# Patient Record
Sex: MALE | Race: WHITE | NOT HISPANIC OR LATINO | Employment: FULL TIME | URBAN - METROPOLITAN AREA
[De-identification: names, ages, dates, MRNs, and addresses within clinical notes are randomized per-mention and may not be internally consistent; named-entity substitution may affect disease eponyms.]

---

## 2017-05-21 ENCOUNTER — ANESTHESIA (OUTPATIENT)
Dept: ENDOSCOPY | Facility: HOSPITAL | Age: 42
End: 2017-05-21

## 2017-05-21 ENCOUNTER — HOSPITAL ENCOUNTER (EMERGENCY)
Facility: HOSPITAL | Age: 42
Discharge: HOME | End: 2017-05-22
Attending: EMERGENCY MEDICINE | Admitting: INTERNAL MEDICINE

## 2017-05-21 DIAGNOSIS — R07.9 CHEST PAIN: ICD-10-CM
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
## 2017-05-21 DIAGNOSIS — T18.128A FOOD IMPACTION OF ESOPHAGUS, INITIAL ENCOUNTER: Primary | ICD-10-CM

## 2017-05-21 PROCEDURE — 99284 EMERGENCY DEPT VISIT MOD MDM: CPT | Mod: ,,, | Performed by: EMERGENCY MEDICINE

## 2017-05-21 PROCEDURE — 99254 IP/OBS CNSLTJ NEW/EST MOD 60: CPT | Mod: ,,, | Performed by: INTERNAL MEDICINE

## 2017-05-21 PROCEDURE — 93005 ELECTROCARDIOGRAM TRACING: CPT

## 2017-05-21 PROCEDURE — 99285 EMERGENCY DEPT VISIT HI MDM: CPT | Mod: 25

## 2017-05-21 PROCEDURE — 96374 THER/PROPH/DIAG INJ IV PUSH: CPT

## 2017-05-21 PROCEDURE — 25000003 PHARM REV CODE 250: Performed by: STUDENT IN AN ORGANIZED HEALTH CARE EDUCATION/TRAINING PROGRAM

## 2017-05-21 PROCEDURE — 93010 ELECTROCARDIOGRAM REPORT: CPT | Mod: ,,, | Performed by: INTERNAL MEDICINE

## 2017-05-21 RX ORDER — GLUCAGON 1 MG
1 KIT INJECTION
Status: COMPLETED | OUTPATIENT
Start: 2017-05-21 | End: 2017-05-21

## 2017-05-21 RX ADMIN — GLUCAGON HYDROCHLORIDE 1 MG: 1 INJECTION, POWDER, FOR SOLUTION INTRAMUSCULAR; INTRAVENOUS; SUBCUTANEOUS at 10:05

## 2017-05-22 ENCOUNTER — ANESTHESIA EVENT (OUTPATIENT)
Dept: ENDOSCOPY | Facility: HOSPITAL | Age: 42
End: 2017-05-22

## 2017-05-22 ENCOUNTER — SURGERY (OUTPATIENT)
Age: 42
End: 2017-05-22

## 2017-05-22 VITALS
OXYGEN SATURATION: 98 % | DIASTOLIC BLOOD PRESSURE: 103 MMHG | TEMPERATURE: 98 F | WEIGHT: 220 LBS | HEART RATE: 75 BPM | SYSTOLIC BLOOD PRESSURE: 143 MMHG | RESPIRATION RATE: 20 BRPM | HEIGHT: 76 IN | BODY MASS INDEX: 26.79 KG/M2

## 2017-05-22 DIAGNOSIS — T18.128D FOOD IMPACTION OF ESOPHAGUS, SUBSEQUENT ENCOUNTER: Primary | ICD-10-CM
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
## 2017-05-22 PROBLEM — T18.128A FOOD IMPACTION OF ESOPHAGUS: Status: ACTIVE | Noted: 2017-05-22
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
## 2017-05-22 PROCEDURE — D9220A PRA ANESTHESIA: Mod: ,,, | Performed by: ANESTHESIOLOGY

## 2017-05-22 PROCEDURE — 43247 EGD REMOVE FOREIGN BODY: CPT | Mod: ,,, | Performed by: INTERNAL MEDICINE

## 2017-05-22 PROCEDURE — 27201042 HC RETRIEVAL NET: Performed by: INTERNAL MEDICINE

## 2017-05-22 PROCEDURE — 63600175 PHARM REV CODE 636 W HCPCS: Performed by: ANESTHESIOLOGY

## 2017-05-22 PROCEDURE — 25000003 PHARM REV CODE 250: Performed by: ANESTHESIOLOGY

## 2017-05-22 PROCEDURE — 99254 IP/OBS CNSLTJ NEW/EST MOD 60: CPT | Mod: ,,, | Performed by: INTERNAL MEDICINE

## 2017-05-22 PROCEDURE — 37000009 HC ANESTHESIA EA ADD 15 MINS: Performed by: INTERNAL MEDICINE

## 2017-05-22 PROCEDURE — 43247 EGD REMOVE FOREIGN BODY: CPT | Performed by: INTERNAL MEDICINE

## 2017-05-22 PROCEDURE — 37000008 HC ANESTHESIA 1ST 15 MINUTES: Performed by: INTERNAL MEDICINE

## 2017-05-22 RX ORDER — LORAZEPAM 2 MG/ML
0.25 INJECTION INTRAMUSCULAR ONCE AS NEEDED
Status: DISCONTINUED | OUTPATIENT
Start: 2017-05-22 | End: 2017-05-22 | Stop reason: HOSPADM

## 2017-05-22 RX ORDER — FENTANYL CITRATE 50 UG/ML
INJECTION, SOLUTION INTRAMUSCULAR; INTRAVENOUS
Status: DISCONTINUED | OUTPATIENT
Start: 2017-05-22 | End: 2017-05-22

## 2017-05-22 RX ORDER — FENTANYL CITRATE 50 UG/ML
25 INJECTION, SOLUTION INTRAMUSCULAR; INTRAVENOUS EVERY 5 MIN PRN
Status: DISCONTINUED | OUTPATIENT
Start: 2017-05-22 | End: 2017-05-22 | Stop reason: HOSPADM

## 2017-05-22 RX ORDER — HYDROMORPHONE HYDROCHLORIDE 1 MG/ML
0.2 INJECTION, SOLUTION INTRAMUSCULAR; INTRAVENOUS; SUBCUTANEOUS EVERY 5 MIN PRN
Status: DISCONTINUED | OUTPATIENT
Start: 2017-05-22 | End: 2017-05-22 | Stop reason: HOSPADM

## 2017-05-22 RX ORDER — LIDOCAINE HCL/PF 100 MG/5ML
SYRINGE (ML) INTRAVENOUS
Status: DISCONTINUED | OUTPATIENT
Start: 2017-05-22 | End: 2017-05-22

## 2017-05-22 RX ORDER — MIDAZOLAM HYDROCHLORIDE 1 MG/ML
INJECTION, SOLUTION INTRAMUSCULAR; INTRAVENOUS
Status: DISCONTINUED | OUTPATIENT
Start: 2017-05-22 | End: 2017-05-22

## 2017-05-22 RX ORDER — ONDANSETRON 2 MG/ML
4 INJECTION INTRAMUSCULAR; INTRAVENOUS DAILY PRN
Status: DISCONTINUED | OUTPATIENT
Start: 2017-05-22 | End: 2017-05-22 | Stop reason: HOSPADM

## 2017-05-22 RX ORDER — PROPOFOL 10 MG/ML
VIAL (ML) INTRAVENOUS
Status: DISCONTINUED | OUTPATIENT
Start: 2017-05-22 | End: 2017-05-22

## 2017-05-22 RX ORDER — SODIUM CHLORIDE 0.9 % (FLUSH) 0.9 %
3 SYRINGE (ML) INJECTION
Status: DISCONTINUED | OUTPATIENT
Start: 2017-05-22 | End: 2017-05-22 | Stop reason: HOSPADM

## 2017-05-22 RX ORDER — ROCURONIUM BROMIDE 10 MG/ML
INJECTION, SOLUTION INTRAVENOUS
Status: DISCONTINUED | OUTPATIENT
Start: 2017-05-22 | End: 2017-05-22

## 2017-05-22 RX ORDER — SODIUM CHLORIDE 9 MG/ML
INJECTION, SOLUTION INTRAVENOUS CONTINUOUS PRN
Status: DISCONTINUED | OUTPATIENT
Start: 2017-05-22 | End: 2017-05-22

## 2017-05-22 RX ORDER — SUCCINYLCHOLINE CHLORIDE 20 MG/ML
INJECTION INTRAMUSCULAR; INTRAVENOUS
Status: DISCONTINUED | OUTPATIENT
Start: 2017-05-22 | End: 2017-05-22

## 2017-05-22 RX ADMIN — LIDOCAINE HYDROCHLORIDE 5 ML: 20 INJECTION, SOLUTION INTRAVENOUS at 01:05

## 2017-05-22 RX ADMIN — MIDAZOLAM HYDROCHLORIDE 2 MG: 1 INJECTION, SOLUTION INTRAMUSCULAR; INTRAVENOUS at 01:05

## 2017-05-22 RX ADMIN — FENTANYL CITRATE 50 MCG: 50 INJECTION, SOLUTION INTRAMUSCULAR; INTRAVENOUS at 01:05

## 2017-05-22 RX ADMIN — SODIUM CHLORIDE: 0.9 INJECTION, SOLUTION INTRAVENOUS at 01:05

## 2017-05-22 RX ADMIN — PROPOFOL 160 MG: 10 INJECTION, EMULSION INTRAVENOUS at 01:05

## 2017-05-22 RX ADMIN — ROCURONIUM BROMIDE 5 MG: 10 INJECTION, SOLUTION INTRAVENOUS at 01:05

## 2017-05-22 RX ADMIN — SUCCINYLCHOLINE CHLORIDE 120 MG: 20 INJECTION, SOLUTION INTRAMUSCULAR; INTRAVENOUS at 01:05

## 2017-05-22 NOTE — CONSULTS
Gastroenterology   Consult note      SUBJECTIVE:     Reason for Consult:  Food impaction     History of Present Illness:  Patient is a 42 y.o. male w/o significant PMHx presents after ingesting a large piece of steak (apx 4cm x 2.5 cm) apx 3 hours ago. Pt acknowledges the piece was not completely chewed and he was a little rushed while swallowing. Pt has not been able to tolerate anything PO in this time period, unable to swallow saliva but denies SOB, endorses sternal CP and discomfort.  Continues to not be able to swallow secretions after dose of glucagon.    States this has never happened in the past.  Denies NSAID use.  Denies reflux symptoms.        (Not in a hospital admission)    Review of patient's allergies indicates:  No Known Allergies     No past medical history on file.  No past surgical history on file.  No family history on file.  Social History   Substance Use Topics    Smoking status: Not on file    Smokeless tobacco: Not on file    Alcohol use Not on file       Review of Systems:  Constitutional: Positive for appetite change.   HENT: Positive for drooling and trouble swallowing. Negative for congestion, dental problem, ear discharge  Eyes: Negative for pain, discharge, redness, itching and visual disturbance.   Respiratory: Positive for chest tightness.  Cardiovascular: Negative for chest pain, palpitations and leg swelling.   Endocrine: Negative for cold intolerance, heat intolerance, polydipsia and polyphagia.   Musculoskeletal: Negative for arthralgias, back pain and gait problem.   Allergic/Immunologic: Negative for environmental allergies, food allergies and immunocompromised state.   Neurological: Negative for dizziness, seizures, facial asymmetry, speech difficulty, light-headedness, numbness and headaches.   Hematological: Negative for adenopathy. Does not bruise/bleed easily.   Psychiatric/Behavioral: Negative for behavioral problems, confusion     OBJECTIVE:     Vital Signs (Most  Recent)  Temp: 97.8 °F (36.6 °C) (05/21/17 2220)  Pulse: 64 (05/21/17 2220)  Resp: 18 (05/21/17 2220)  BP: 125/88 (05/21/17 2227)  SpO2: 99 % (05/21/17 2227)    Physical Exam:  General appearance: well developed, well nourished  Eyes:  No scleral icterus   Throat: lips, mucosa, and tongue normal  Neck: supple, symmetrical, trachea midline, no JVD   Lungs:  clear to auscultation bilaterally and normal respiratory effort  Heart: regular rate and rhythm, S1, S2 normal, no murmur, click, rub or gallop  Abdomen: soft, non-tender, non-distended;BS positive x 4 quadrants; no masses,  no organomegaly, no rebound tenderness or guarding  Extremities: no cyanosis   Pulses: 2+ and symmetric  Skin: Skin color, texture, turgor normal.   Neurologic: alert and oriented x 3     Laboratory    CBC: No results for input(s): WBC, RBC, HGB, HCT, PLT, MCV, MCH, MCHC in the last 168 hours.  BMP: No results for input(s): GLU, NA, K, CL, CO2, BUN, CREATININE, CALCIUM, MG in the last 168 hours.  Coagulation: No results for input(s): INR, APTT in the last 168 hours.    Invalid input(s): PT  Microbiology Results (last 7 days)     ** No results found for the last 168 hours. **          Diagnostic Results:  Reviewed     ASSESSMENT/PLAN:     There are no hospital problems to display for this patient.    42 y.o. male w/o significant PMHx presents after ingesting a large piece of steak (apx 4cm x 2.5 cm) around 8pm.  Given a dose of glucagon, patient still not able to tolerate secretions.    Recommendations:   - keep patient NPO  - will plan for urgent EGD with anesthesia support given patient with likely esophageal food impaction and inability to tolerate oral secretions      Ilan Yoon  Gastroenterology Fellow (PGY 5)

## 2017-05-22 NOTE — ANESTHESIA POSTPROCEDURE EVALUATION
"Anesthesia Post Evaluation    Patient: Farzad Moore    Procedure(s) Performed: Procedure(s) (LRB):  ESOPHAGOGASTRODUODENOSCOPY (EGD) (N/A)    Final Anesthesia Type: general  Patient location during evaluation: PACU  Patient participation: Yes- Able to Participate  Level of consciousness: awake and alert and oriented  Post-procedure vital signs: reviewed and stable  Pain management: adequate  Airway patency: patent  PONV status at discharge: No PONV  Anesthetic complications: no      Cardiovascular status: stable  Respiratory status: unassisted  Hydration status: euvolemic  Follow-up not needed.        Visit Vitals  BP (!) 152/102   Pulse 71   Temp 37 °C (98.6 °F) (Axillary)   Resp 13   Ht 6' 4" (1.93 m)   Wt 99.8 kg (220 lb)   SpO2 99%   BMI 26.78 kg/m²       Pain/Shirley Score: Pain Assessment Performed: Yes (5/22/2017  2:04 AM)  Presence of Pain: non-verbal indicators absent (5/22/2017  2:04 AM)  Shirley Score: 8 (5/22/2017  2:04 AM)      "

## 2017-05-22 NOTE — ANESTHESIA RELEASE NOTE
"Anesthesia Release from PACU Note    Patient: Farzad Moore    Procedure(s) Performed: Procedure(s) (LRB):  ESOPHAGOGASTRODUODENOSCOPY (EGD) (N/A)    Anesthesia type: general    Post pain: Adequate analgesia    Post assessment: no apparent anesthetic complications, tolerated procedure well and no evidence of recall    Last Vitals:   Visit Vitals  BP (!) 152/102   Pulse 71   Temp 37 °C (98.6 °F) (Axillary)   Resp 13   Ht 6' 4" (1.93 m)   Wt 99.8 kg (220 lb)   SpO2 99%   BMI 26.78 kg/m²       Post vital signs: stable    Level of consciousness: awake, alert  and oriented    Nausea/Vomiting: no nausea/no vomiting    Complications: none    Airway Patency: patent    Respiratory: unassisted    Cardiovascular: stable and blood pressure at baseline    Hydration: euvolemic  "

## 2017-05-22 NOTE — TRANSFER OF CARE
"Anesthesia Transfer of Care Note    Patient: Farzad Moore    Procedure(s) Performed: Procedure(s) (LRB):  ESOPHAGOGASTRODUODENOSCOPY (EGD) (N/A)    Patient location: PACU    Anesthesia Type: general    Transport from OR: Transported from OR on room air with adequate spontaneous ventilation    Post pain: adequate analgesia    Post assessment: no apparent anesthetic complications    Post vital signs: stable    Level of consciousness: sedated    Nausea/Vomiting: no nausea/vomiting    Complications: none    Transfer of care protocol was followed      Last vitals:   Visit Vitals  /81   Pulse 73   Temp 37 °C (98.6 °F) (Axillary)   Resp 15   Ht 6' 4" (1.93 m)   Wt 99.8 kg (220 lb)   SpO2 (!) 94%   BMI 26.78 kg/m²     "

## 2017-05-22 NOTE — ED PROVIDER NOTES
Encounter Date: 5/21/2017       History     Chief Complaint   Patient presents with    Foreign Body In Throat     a piece of steak stuck in throat. no SOB     Review of patient's allergies indicates:  No Known Allergies  41yo male w/o significant PMHx presents after ingesting a large piece of steak (apx 4cm x 2.5 cm) apx 3 hours ago. Pt acknowledges the piece was not completely chewed and he was a little rushed while swallowing. Pt has not been able to tolerate anything PO in this time period, unable to swallow saliva but denies SOB, endorses sternal CP and discomfort.       The history is provided by the patient.     No past medical history on file.  No past surgical history on file.  No family history on file.  Social History   Substance Use Topics    Smoking status: Not on file    Smokeless tobacco: Not on file    Alcohol use Not on file     Review of Systems   Constitutional: Positive for appetite change. Negative for activity change, chills, diaphoresis, fatigue and unexpected weight change.   HENT: Positive for drooling and trouble swallowing. Negative for congestion, dental problem, ear discharge, postnasal drip, tinnitus and voice change.    Eyes: Negative for pain, discharge, redness, itching and visual disturbance.   Respiratory: Positive for chest tightness. Negative for apnea, cough, shortness of breath, wheezing and stridor.    Cardiovascular: Negative for chest pain, palpitations and leg swelling.   Endocrine: Negative for cold intolerance, heat intolerance, polydipsia and polyphagia.   Musculoskeletal: Negative for arthralgias, back pain and gait problem.   Allergic/Immunologic: Negative for environmental allergies, food allergies and immunocompromised state.   Neurological: Negative for dizziness, seizures, facial asymmetry, speech difficulty, light-headedness, numbness and headaches.   Hematological: Negative for adenopathy. Does not bruise/bleed easily.   Psychiatric/Behavioral: Negative for  behavioral problems, confusion, decreased concentration and dysphoric mood.       Physical Exam     Initial Vitals [05/21/17 2220]   BP Pulse Resp Temp SpO2   (!) 142/92 64 18 97.8 °F (36.6 °C) 97 %     Physical Exam    Constitutional: He appears well-developed and well-nourished. He is not diaphoretic. He appears distressed.   HENT:   Head: Normocephalic and atraumatic.   Right Ear: External ear normal.   Left Ear: External ear normal.   Nose: Nose normal.   Mouth/Throat: Oropharynx is clear and moist.   Eyes: Conjunctivae and EOM are normal. Pupils are equal, round, and reactive to light. Right eye exhibits no discharge. Left eye exhibits no discharge.   Neck: Normal range of motion. Neck supple. No thyromegaly present. No tracheal deviation present. No JVD present.   Cardiovascular: Normal rate, regular rhythm, normal heart sounds and intact distal pulses.   No murmur heard.  Pulmonary/Chest: Breath sounds normal. No stridor. No respiratory distress. He has no wheezes. He has no rhonchi. He exhibits no tenderness.   Abdominal: Soft. Bowel sounds are normal. He exhibits no mass. There is no tenderness. There is no rebound and no guarding.   Musculoskeletal: He exhibits no edema or tenderness.   Lymphadenopathy:     He has no cervical adenopathy.   Neurological: He is alert and oriented to person, place, and time. He has normal reflexes.   Psychiatric: He has a normal mood and affect. His behavior is normal. Thought content normal.         ED Course   Procedures  Labs Reviewed - No data to display  EKG Readings: (Independently Interpreted)   NSR, no ST segment elevations or depressions noted        X-Rays:   Independently Interpreted Readings:   Other Readings:  Cxr:  No acute infiltrate, consolidation or effusion.      Medical Decision Making:   Initial Assessment:   43yo M w/o PMHx not on any medications presents following ingestion of incompletely chewed large piece of steak.   Differential Diagnosis:   Food  impaction   ED Management:  --CXR pending, EKG WNL, 1mg glucagon given at this time. GI contacted. Further management pending pts response to therapy     --Pt continues to complain of discomfort and inability to swallow secretions. Discussed w/ GI who will move to endoscopy unit for urgent EGD at this time, assistance greatly appreciated   12:31 AM                Attending Attestation:   Physician Attestation Statement for Resident:  As the supervising MD   Physician Attestation Statement: I have personally seen and examined this patient.   I agree with the above history. -:   As the supervising MD I agree with the above PE.   -: Phonating well, no resp distress  occ spitting up clear secretions  ctab  Rrr, nl s1/2  No crepitus  abd nontender   As the supervising MD I agree with the above treatment, course, plan, and disposition.   -: Imp:  Probable impacted food bolus.  Cxr, screening ekg, trial of glucagon, consult gi.  No acute resp distress.    I have reviewed and agree with the residents interpretation of the following: x-rays and EKG.                    ED Course     Clinical Impression:   The primary encounter diagnosis was Food impaction of esophagus, initial encounter. A diagnosis of Chest pain was also pertinent to this visit.          Toney Escobar MD  05/23/17 4172

## 2017-05-22 NOTE — PROGRESS NOTES
Discharge:    Patient was given a copy of discharge instructions and is aware of his follow up appointments. Telemetry and IV lines removed and dressings applied. Patient verbalized complete understanding of home care instructions and medication regimen. Patient left the floor via wheelchair with RN and had all of his personal belongings. He safely entered the vehicle with his friend for transport home and was in no distress.

## 2017-05-22 NOTE — ANESTHESIA PREPROCEDURE EVALUATION
"                                                                                                             05/22/2017    Pre-operative evaluation for Procedure(s) (LRB):  ESOPHAGOGASTRODUODENOSCOPY (EGD) (N/A)    Farzad Moore is a 42 y.o. male without significant PMHx who presents from home with complaint of undigested food particle stuck in esophagus.  States that he was eating steak around 9 PM when he felt like a piece he swallowed got "caught going down."  Denies cough or SOB.  Endorses sternal CP.  No prior history of anesthesia or anesthetic complications.    There is no problem list on file for this patient.      Review of patient's allergies indicates:  No Known Allergies    No current facility-administered medications on file prior to encounter.      No current outpatient prescriptions on file prior to encounter.       No past surgical history on file.    Social History     Social History    Marital status: Single     Spouse name: N/A    Number of children: N/A    Years of education: N/A     Occupational History    Not on file.     Social History Main Topics    Smoking status: Not on file    Smokeless tobacco: Not on file    Alcohol use Not on file    Drug use: Unknown    Sexual activity: Not on file     Other Topics Concern    Not on file     Social History Narrative    No narrative on file         Vital Signs Range (Last 24H):  Temp:  [36.6 °C (97.8 °F)]   Pulse:  [64]   Resp:  [18]   BP: (125-142)/(82-92)   SpO2:  [97 %-99 %]       Diagnostic Studies:      EKG:  pending    Anesthesia Evaluation    I have reviewed the Patient Summary Reports.    I have reviewed the Nursing Notes.   I have reviewed the Medications.     Review of Systems  Anesthesia Hx:  Denies Family Hx of Anesthesia complications.   Denies Personal Hx of Anesthesia complications.   Hematology/Oncology:  Hematology Normal        Cardiovascular:  Cardiovascular Normal     Pulmonary:  Pulmonary Normal    Renal/:  Renal/ " Normal     Hepatic/GI:  Hepatic/GI Normal    Neurological:  Neurology Normal    Endocrine:  Endocrine Normal    Psych:  Psychiatric Normal           Physical Exam  General:  Well nourished    Airway/Jaw/Neck:  Airway Findings: Mouth Opening: Normal Tongue: Normal  General Airway Assessment: Adult  Mallampati: I  TM Distance: Normal, at least 6 cm      Dental:  Dental Findings: In tact   Chest/Lungs:  Chest/Lungs Findings: Clear to auscultation, Normal Respiratory Rate     Heart/Vascular:  Heart Findings: Rate: Normal  Rhythm: Regular Rhythm        Mental Status:  Mental Status Findings:  Cooperative, Alert and Oriented         Anesthesia Plan  Type of Anesthesia, risks & benefits discussed:  Anesthesia Type:  general, MAC  Patient's Preference:   Intra-op Monitoring Plan: standard ASA monitors  Intra-op Monitoring Plan Comments:   Post Op Pain Control Plan:   Post Op Pain Control Plan Comments:   Induction:   IV  Beta Blocker:  Patient is not currently on a Beta-Blocker (No further documentation required).       Informed Consent: Patient understands risks and agrees with Anesthesia plan.  Questions answered. Anesthesia consent signed with patient.  ASA Score: 1  emergent   Day of Surgery Review of History & Physical:    H&P update referred to the provider.         Ready For Surgery From Anesthesia Perspective.

## 2017-05-22 NOTE — ED NOTES
Farzad Moore, a 42 y.o. male presents to the ED complaining of steak caught in his throat tonight.    Chief Complaint   Patient presents with    Foreign Body In Throat     a piece of steak stuck in throat. no SOB     Review of patient's allergies indicates:  No Known Allergies  No past medical history on file.      Adult Physical Assessment  LOC: Farzad Moore, 42 y.o. male verified via two identifiers.  The patient is awake, alert, oriented and speaking appropriately at this time.  APPEARANCE: Patient resting comfortably and appears to be in no acute distress at this time. Patient is clean and well groomed, patient's clothing is properly fastened.  SKIN:The skin is warm and dry, color consistent with ethnicity, patient has normal skin turgor and moist mucus membranes, skin intact, no breakdown or brusing noted.  MUSCULOSKELETAL: Patient moving all extremities well, no obvious swelling or deformities noted.  RESPIRATORY: Airway is open and patent, respirations are spontaneous, patient has a normal effort and rate, no accessory muscle use noted.  CARDIAC: Patient has a normal rate and rhythm, no periphreal edema noted in any extremity, capillary refill < 3 seconds in all extremities  ABDOMEN: Soft and non tender to palpation, no abdominal distention noted. Bowel sounds present in all four quadrants.  NEUROLOGIC: Eyes open spontaneously, behavior appropriate to situation, follows commands, facial expression symmetrical, bilateral hand grasp equal and even, purposeful motor response noted, normal sensation in all extremities when touched with a finger.

## 2017-05-22 NOTE — DISCHARGE INSTRUCTIONS

## 2017-05-22 NOTE — PATIENT INSTRUCTIONS
Discharge Summary/Instructions for after EGD with Biopsy  Patient Name: Farzad Moore  Patient MRN: [id number]  Patient YOB: 1975  Monday, May 22, 2017  Harjeet Jones MD  RESTRICTIONS ON ACTIVITY:  - DO NOT drive a car or operate machinery until the day after the   procedure.  - Following Day:  Return to full activities including work.  - Diet:  Eat and drink normally unless instructed otherwise.  TREATMENT FOR COMMON SIDE EFFECTS:  - Sore Throat - treat with throat lozenges, gargle with warm salt water.  - Mild abdominal pain & bloating - rest and take liquids only.  SYMPTOMS TO WATCH FOR AND REPORT TO YOUR PHYSICIAN:  1.  Chills or fever occurring within 24 hours after procedure.  2.  Pain in chest.  3.  SEVERE abdominal pain or bloating.  4.  Rectal bleeding which would show as maroon or black stools.  Your doctor recommends these additional instructions:  If any biopsies were performed, my office will call you in 5 to 6 business   days with any results.  You have a contact number available for emergencies.  The signs and symptoms   of potential delayed complications were discussed with you.  You may return   to normal activities tomorrow.  Written discharge instructions were   provided to you.   You are being discharged to home.   Resume your previous diet.   Take Prilosec (omeprazole) 40 mg by mouth once a day.   Your physician has recommended a repeat upper endoscopy in 12 weeks to check   healing.  If you have any questions or problems, please call your physician.  EMERGENCY PHONE NUMBER: [phone]  LAB RESULTS: [phone]         Harjeet Jones MD  5/22/2017 1:58:18 AM  This report has been verified and signed electronically.

## 2017-05-22 NOTE — PROGRESS NOTES
GI Follow-up Note    EGD showed food impacted in the esophagus, successfully removed, patient needs to take Proton pump inhibitor twice daily by mouth for 12 weeks and have repeat EGD in 12 weeks, see endoscopy report for full details.  Also needs to chew food thoroughly.    Ilan Yoon  Gastroenterology Fellow (PGY 5)

## 2018-11-30 ENCOUNTER — TELEPHONE (OUTPATIENT)
Dept: SURGERY | Facility: CLINIC | Age: 43
End: 2018-11-30

## 2018-11-30 NOTE — TELEPHONE ENCOUNTER
Phoned patient at mobile number and could not leave a number, LVM at home number regarding his appointment being changed from Thursday, December 13 to Friday December 14 at the same time 9:15 AM but at the [facility].  Asked that he call back to confirm but will also mail the appointment reminder.

## 2018-12-14 ENCOUNTER — OFFICE VISIT (OUTPATIENT)
Dept: SURGERY | Facility: CLINIC | Age: 43
End: 2018-12-14
Payer: MEDICAID

## 2018-12-14 VITALS
SYSTOLIC BLOOD PRESSURE: 131 MMHG | DIASTOLIC BLOOD PRESSURE: 70 MMHG | HEART RATE: 77 BPM | WEIGHT: 207.88 LBS | TEMPERATURE: 99 F | BODY MASS INDEX: 25.31 KG/M2

## 2018-12-14 DIAGNOSIS — K40.90 RIGHT INGUINAL HERNIA: Primary | ICD-10-CM

## 2018-12-14 PROCEDURE — 99999 PR PBB SHADOW E&M-EST. PATIENT-LVL IV: CPT | Mod: PBBFAC,,, | Performed by: SURGERY

## 2018-12-14 PROCEDURE — 99214 OFFICE O/P EST MOD 30 MIN: CPT | Mod: PBBFAC,PO | Performed by: SURGERY

## 2018-12-14 PROCEDURE — 99204 OFFICE O/P NEW MOD 45 MIN: CPT | Mod: S$PBB,,, | Performed by: SURGERY

## 2018-12-14 RX ORDER — DEXTROAMPHETAMINE SACCHARATE, AMPHETAMINE ASPARTATE MONOHYDRATE, DEXTROAMPHETAMINE SULFATE AND AMPHETAMINE SULFATE 7.5; 7.5; 7.5; 7.5 MG/1; MG/1; MG/1; MG/1
30 CAPSULE, EXTENDED RELEASE ORAL EVERY MORNING
COMMUNITY

## 2018-12-14 NOTE — H&P (VIEW-ONLY)
History & Physical    SUBJECTIVE:     History of Present Illness:  Patient is a 43 y.o. male without PMH presents with right sided inguinal hernia. He had one on the left repaired 8years ago, no issues. This one has been present a few weeks, he is a , it has bothering him some and interfering with work.     No chief complaint on file.      Review of patient's allergies indicates:  No Known Allergies    Current Outpatient Medications   Medication Sig Dispense Refill    dextroamphetamine-amphetamine (ADDERALL XR) 30 MG 24 hr capsule Take 30 mg by mouth every morning.       No current facility-administered medications for this visit.        No past medical history on file.  Past Surgical History:   Procedure Laterality Date    ESOPHAGOGASTRODUODENOSCOPY (EGD) N/A 5/22/2017    Performed by Harjeet Jones MD at [facility] ([address] [place] [address])     No family history on file.  Social History     Tobacco Use    Smoking status: Never Smoker   Substance Use Topics    Alcohol use: No    Drug use: No        Review of Systems:  Review of Systems   Constitutional: Negative for activity change, appetite change, chills and fever.   HENT: Negative for congestion and trouble swallowing.    Eyes: Negative for visual disturbance.   Respiratory: Negative for cough and shortness of breath.    Cardiovascular: Negative for chest pain and leg swelling.   Gastrointestinal: Negative for abdominal distention, abdominal pain, constipation, diarrhea, nausea and vomiting.   Endocrine: Negative for cold intolerance and heat intolerance.   Genitourinary: Negative for difficulty urinating and dysuria.   Musculoskeletal: Negative for arthralgias and back pain.   Skin: Negative for rash and wound.   Neurological: Negative for dizziness and headaches.   Psychiatric/Behavioral: Negative for agitation and behavioral problems.       OBJECTIVE:     Vital Signs (Most Recent)  Temp: 98.8 °F (37.1 °C) (12/14/18 0923)  Pulse: 77 (12/14/18 0923)  BP:  131/70 (12/14/18 0923)     94.3 kg (207 lb 14.3 oz)     Physical Exam:  Physical Exam   Constitutional: He is oriented to person, place, and time. He appears well-developed and well-nourished. No distress.   Cardiovascular: Normal rate and regular rhythm. Exam reveals no gallop and no friction rub.   No murmur heard.  Pulmonary/Chest: Effort normal and breath sounds normal. No respiratory distress. He has no wheezes. He has no rales.   Abdominal: Soft. Bowel sounds are normal. He exhibits no distension. There is no tenderness. There is no rebound.   Moderate sized right inguinal hernia, reducible.   1cm umbilical hernia, reducible.   Musculoskeletal: Normal range of motion. He exhibits no edema.   Neurological: He is alert and oriented to person, place, and time.   Skin: Skin is warm and dry.   Psychiatric: He has a normal mood and affect. His behavior is normal.       ASSESSMENT/PLAN:     44yo male with symptomatic right inguinal hernia.  Not interested in umbilical hernia repair, asymptomatic    PLAN:Plan     Open RIH repair with mesh after New Year     I have personally performed a detailed history and physical examination on this patient. My findings are summarized in the resident's note included in the record.  Patient is single parent of a three year old  Also is a  with a very active work requirement  Will defer surgery until 1/7 so that he can arrange his work and personal issues

## 2018-12-14 NOTE — PATIENT INSTRUCTIONS
What Is a Hernia?    A hernia is when an organ or tissue pushes through a weak area in the belly (abdominal) wall. This weak area may be present at birth. Or it may be caused by abdominal strain over time. If not treated, a hernia can get worse with time and physical stress.  When a bulge forms  When there is a weak area in the abdominal wall, an organ or tissue can push outward. This often causes a bulge that you can see under your skin. The bulge may get bigger when you stand up. It may go away when you lie down. You may also feel some pressure or mild pain when lifting, coughing, urinating, or doing other activities.  Types of hernias  The type of hernia you have depends on its location. Most hernias form in the groin at or near the internal ring. This is the entrance to a canal between the abdomen and groin. Hernias can also occur in the abdomen, thigh, or genitals.  · An incisional hernia occurs at the site of a previous surgical incision.  · An umbilical hernia occurs at the navel.  · An indirect inguinal hernia occurs in the groin at the internal ring.  · A direct inguinal hernia occurs in the groin near the internal ring.  · A femoral hernia occurs just below the groin.  · An epigastric hernia occurs in the upper abdomen at the midline.  Diagnosis  In most cases, your healthcare provider can diagnose a hernia by doing a physical exam.  In some cases it might not be clear why you have a swelling in the belly wall. Then your provider may order an imaging test such as an ultrasound. This can help with the diagnosis.  Surgery  A hernia will not heal on its own. Surgery is needed to fix the weak spot in the abdominal wall. If not treated, a hernia can get larger. It can also cause serious health problems. The good news is that hernia surgery can be done quickly and safely. In some cases, you can go home the same day as your surgery.   When to call your provider  Call your healthcare provider right away if the  swelling around your hernia becomes larger, firmer, or more painful. These may be signs that your intestines are stuck in the abdominal wall. This is an emergency. The hernia must be repaired right away to avoid serious problems.  Date Last Reviewed: 7/1/2016  © 2373-0189 [employer]. [address] [place] [address]. All rights reserved. This information is not intended as a substitute for professional medical care. Always follow your healthcare professional's instructions.        How a Hernia Develops  Although a hernia bulge may appear suddenly, hernias often take years to develop. They grow larger as pressure inside the body presses the intestines or other tissues out through a weak area in the abdominal wall, often at the belly button or a site of previous surgery. With time, these tissues can bulge out beneath the skin.  Stages of hernia development    The wall weakens or tears. The abdominal lining bulges out through a weak area and begins to form a hernia sac. The sac may contain fat, intestine, or other tissues. At this point, the hernia may or may not cause a visible bulge.        The intestine pushes into the sac. As the intestine pushes further into the sac, it forms a visible bulge. The bulge may flatten when you lie down or push against it. This is called a reducible hernia and does not cause any immediate danger.             The intestine may become trapped. The sac containing the intestine may become trapped by muscle (incarcerated). If this happens, you wont be able to flatten the bulge. You may also have pain. Prompt treatment is needed.        The intestine may become strangulated. If the intestine is tightly trapped, it becomes strangulated. The strangulated area loses blood supply and may die. This can cause severe pain and block the intestine. Emergency surgery is needed.   Date Last Reviewed: 8/1/2016  © 7052-4706 [employer]. [address] [place] [address],  [place] [address]. All rights reserved. This information is not intended as a substitute for professional medical care. Always follow your healthcare professional's instructions.        Having Hernia Surgery: Patch Repair  Surgery treats a hernia by repairing the weakness in the abdominal wall. An incision is made so the surgeon has a direct view of the hernia. The repair is then done through this incision (open surgery). To repair the defect, special mesh materials are used to patch the weak area and make a tension-free repair. Follow your healthcare providers advice on how to get ready for the procedure. You can usually go home the same day as your surgery. In some cases, though, you may need to stay in the hospital overnight.  Getting ready for surgery  Your healthcare provider will talk with you about preparing for surgery. Follow all the instructions youre given and be sure to:   · Tell your healthcare provider about any medicines, supplements, or herbs you take. This includes both prescription and over-the-counter items.  · Stop taking aspirin, ibuprofen, naproxen and other NSAIDs as directed.  · Arrange for an adult family member or friend to give you a ride home after surgery.  · Stop smoking. Smoking affects blood flow and can slow healing.  · Gently wash the surgical area the night before surgery.  · Follow any directions you are given for not eating or drinking before surgery.     Repair in Front           Repair in Back           Combination Repair      The day of surgery  Arrive at the hospital or surgical center at your scheduled time. Youll be asked to change into a patient gown. Youll then be given an IV to provide fluids and medicine. Shortly before surgery, an anesthesiologist or nurse anesthetist will talk with you. He or she will explain the types of anesthesia used to prevent pain during surgery. You will have one or more of the following:  · Monitored sedation to make you relaxed and  sleepy.  · Local anesthesia to numb the surgical site.  · Regional anesthesia to numb specific areas of your body.  · General anesthesia to let you sleep during surgery.  During the surgery  Most hernias are treated using tension-free repairs. This is surgery that uses special mesh materials to repair the weak area. Unlike traditional repairs, the abdominal fascia (tissue around the muscle that gives strength to the abdominal wall) isnt sewn together. Instead, the mesh covers the weak area like a patch. This repairs the defect without tension on the muscles. It also makes it less likely to happen again. The mesh is made of strong, flexible plastic that stays in the body. Over time, nearby tissues grow into the mesh to strengthen the repair.  After surgery  When the procedure is over, youll be taken to the recovery area to rest. Your blood pressure and heart rate will be monitored. Youll also have a bandage over the surgical site. To help reduce discomfort, youll be given pain medicines. You may also be given breathing exercises to keep your lungs clear. Later, youll be asked to get up and walk. This helps prevent blood clots in the legs. You can go home when your healthcare provider says youre ready.     Risks and complications  Hernia surgery is safe, but does have risks including:  · Bleeding  · Infection  · Anesthesia risks  · Mesh complications  · Inability to urinate   · Bowel or bladder injury   · Numbness or pain in the groin or leg  · Risk the hernia will happen again  · Damage to the testicles or testicular function      Date Last Reviewed: 10/1/2016  © 5498-6478 [employer], [employer]. [address] [place] [address], [place] [address]. All rights reserved. This information is not intended as a substitute for professional medical care. Always follow your healthcare professional's instructions.

## 2018-12-14 NOTE — PROGRESS NOTES
History & Physical    SUBJECTIVE:     History of Present Illness:  Patient is a 43 y.o. male without PMH presents with right sided inguinal hernia. He had one on the left repaired 8years ago, no issues. This one has been present a few weeks, he is a , it has bothering him some and interfering with work.     No chief complaint on file.      Review of patient's allergies indicates:  No Known Allergies    Current Outpatient Medications   Medication Sig Dispense Refill    dextroamphetamine-amphetamine (ADDERALL XR) 30 MG 24 hr capsule Take 30 mg by mouth every morning.       No current facility-administered medications for this visit.        No past medical history on file.  Past Surgical History:   Procedure Laterality Date    ESOPHAGOGASTRODUODENOSCOPY (EGD) N/A 5/22/2017    Performed by Harjeet Jones MD at [facility] ([address] [place] [address])     No family history on file.  Social History     Tobacco Use    Smoking status: Never Smoker   Substance Use Topics    Alcohol use: No    Drug use: No        Review of Systems:  Review of Systems   Constitutional: Negative for activity change, appetite change, chills and fever.   HENT: Negative for congestion and trouble swallowing.    Eyes: Negative for visual disturbance.   Respiratory: Negative for cough and shortness of breath.    Cardiovascular: Negative for chest pain and leg swelling.   Gastrointestinal: Negative for abdominal distention, abdominal pain, constipation, diarrhea, nausea and vomiting.   Endocrine: Negative for cold intolerance and heat intolerance.   Genitourinary: Negative for difficulty urinating and dysuria.   Musculoskeletal: Negative for arthralgias and back pain.   Skin: Negative for rash and wound.   Neurological: Negative for dizziness and headaches.   Psychiatric/Behavioral: Negative for agitation and behavioral problems.       OBJECTIVE:     Vital Signs (Most Recent)  Temp: 98.8 °F (37.1 °C) (12/14/18 0923)  Pulse: 77 (12/14/18 0923)  BP:  131/70 (12/14/18 0923)     94.3 kg (207 lb 14.3 oz)     Physical Exam:  Physical Exam   Constitutional: He is oriented to person, place, and time. He appears well-developed and well-nourished. No distress.   Cardiovascular: Normal rate and regular rhythm. Exam reveals no gallop and no friction rub.   No murmur heard.  Pulmonary/Chest: Effort normal and breath sounds normal. No respiratory distress. He has no wheezes. He has no rales.   Abdominal: Soft. Bowel sounds are normal. He exhibits no distension. There is no tenderness. There is no rebound.   Moderate sized right inguinal hernia, reducible.   1cm umbilical hernia, reducible.   Musculoskeletal: Normal range of motion. He exhibits no edema.   Neurological: He is alert and oriented to person, place, and time.   Skin: Skin is warm and dry.   Psychiatric: He has a normal mood and affect. His behavior is normal.       ASSESSMENT/PLAN:     42yo male with symptomatic right inguinal hernia.  Not interested in umbilical hernia repair, asymptomatic    PLAN:Plan     Open RIH repair with mesh after New Year     I have personally performed a detailed history and physical examination on this patient. My findings are summarized in the resident's note included in the record.  Patient is single parent of a three year old  Also is a  with a very active work requirement  Will defer surgery until 1/7 so that he can arrange his work and personal issues

## 2018-12-26 ENCOUNTER — LAB VISIT (OUTPATIENT)
Dept: LAB | Facility: HOSPITAL | Age: 43
End: 2018-12-26
Attending: SURGERY
Payer: MEDICAID

## 2018-12-26 DIAGNOSIS — K40.90 RIGHT INGUINAL HERNIA: ICD-10-CM

## 2018-12-26 LAB
ALBUMIN SERPL BCP-MCNC: 3.8 G/DL
ALP SERPL-CCNC: 46 U/L
ALT SERPL W/O P-5'-P-CCNC: 46 U/L
ANION GAP SERPL CALC-SCNC: 9 MMOL/L
AST SERPL-CCNC: 27 U/L
BASOPHILS # BLD AUTO: 0.04 K/UL
BASOPHILS NFR BLD: 0.5 %
BILIRUB SERPL-MCNC: 0.3 MG/DL
BUN SERPL-MCNC: 23 MG/DL
CALCIUM SERPL-MCNC: 9 MG/DL
CHLORIDE SERPL-SCNC: 105 MMOL/L
CO2 SERPL-SCNC: 25 MMOL/L
CREAT SERPL-MCNC: 1.4 MG/DL
DIFFERENTIAL METHOD: ABNORMAL
EOSINOPHIL # BLD AUTO: 0.1 K/UL
EOSINOPHIL NFR BLD: 1.6 %
ERYTHROCYTE [DISTWIDTH] IN BLOOD BY AUTOMATED COUNT: 13.9 %
EST. GFR  (AFRICAN AMERICAN): >60 ML/MIN/1.73 M^2
EST. GFR  (NON AFRICAN AMERICAN): >60 ML/MIN/1.73 M^2
GLUCOSE SERPL-MCNC: 88 MG/DL
HCT VFR BLD AUTO: 52.2 %
HGB BLD-MCNC: 16.6 G/DL
LYMPHOCYTES # BLD AUTO: 1.5 K/UL
LYMPHOCYTES NFR BLD: 19.3 %
MCH RBC QN AUTO: 27.6 PG
MCHC RBC AUTO-ENTMCNC: 31.8 G/DL
MCV RBC AUTO: 87 FL
MONOCYTES # BLD AUTO: 0.6 K/UL
MONOCYTES NFR BLD: 7 %
NEUTROPHILS # BLD AUTO: 5.6 K/UL
NEUTROPHILS NFR BLD: 71.2 %
PLATELET # BLD AUTO: 224 K/UL
PMV BLD AUTO: 10.7 FL
POTASSIUM SERPL-SCNC: 3.8 MMOL/L
PROT SERPL-MCNC: 7.1 G/DL
RBC # BLD AUTO: 6.02 M/UL
SODIUM SERPL-SCNC: 139 MMOL/L
WBC # BLD AUTO: 7.91 K/UL

## 2018-12-26 PROCEDURE — 80053 COMPREHEN METABOLIC PANEL: CPT

## 2018-12-26 PROCEDURE — 36415 COLL VENOUS BLD VENIPUNCTURE: CPT

## 2018-12-26 PROCEDURE — 85025 COMPLETE CBC W/AUTO DIFF WBC: CPT

## 2019-01-03 DIAGNOSIS — K40.90 RIGHT INGUINAL HERNIA: Primary | ICD-10-CM

## 2019-01-03 RX ORDER — SODIUM CHLORIDE 9 MG/ML
INJECTION, SOLUTION INTRAVENOUS CONTINUOUS
Status: CANCELLED | OUTPATIENT
Start: 2019-01-03

## 2019-01-04 ENCOUNTER — TELEPHONE (OUTPATIENT)
Dept: SURGERY | Facility: CLINIC | Age: 44
End: 2019-01-04

## 2019-01-04 RX ORDER — CLONAZEPAM 1 MG/1
1 TABLET ORAL NIGHTLY PRN
COMMUNITY
End: 2021-01-08

## 2019-01-04 RX ORDER — HYDROCODONE BITARTRATE AND ACETAMINOPHEN 5; 325 MG/1; MG/1
1 TABLET ORAL EVERY 4 HOURS PRN
COMMUNITY
End: 2021-01-08

## 2019-01-04 NOTE — TELEPHONE ENCOUNTER
Pre op call completed.  Instructed to arrive at the [facility] for 0715, NPO after MN, have someone drive him home, and shower with antibacterial soap the night before and morning of.  He repeated all the instructions.

## 2019-01-06 ENCOUNTER — ANESTHESIA EVENT (OUTPATIENT)
Dept: SURGERY | Facility: HOSPITAL | Age: 44
End: 2019-01-06
Payer: MEDICAID

## 2019-01-06 NOTE — ANESTHESIA PREPROCEDURE EVALUATION
[facility]  Anesthesia Pre-Operative Evaluation         Patient Name: Farzad Moore  YOB: 1975  MRN: [id number]    SUBJECTIVE:     Pre-operative evaluation for Procedure(s) (LRB):  REPAIR, HERNIA, INGUINAL, WITHOUT HISTORY OF PRIOR REPAIR, AGE 7 YEARS OR OLDER, open with mesh (Right)     01/06/2019    Farzad Moore is a 43 y.o. male w/ no significant PMHx presents for right inguinal hernia repair.    Patient now presents for the above procedure(s).      LDA: None documented.    Prev airway: Mask not attempted; DL w/ Zuleta 2 Grade I view.     Drips: None documented.    Patient Active Problem List   Diagnosis    Food impaction of esophagus    Right inguinal hernia       Review of patient's allergies indicates:  No Known Allergies    Current Outpatient Medications:  No current facility-administered medications for this encounter.     Current Outpatient Medications:     clonazePAM (KLONOPIN) 1 MG tablet, Take 1 mg by mouth nightly as needed for Anxiety., Disp: , Rfl:     dextroamphetamine-amphetamine (ADDERALL XR) 30 MG 24 hr capsule, Take 30 mg by mouth every morning., Disp: , Rfl:     HYDROcodone-acetaminophen (NORCO) 5-325 mg per tablet, Take 1 tablet by mouth every 4 (four) hours as needed for Pain., Disp: , Rfl:     Past Surgical History:   Procedure Laterality Date    ESOPHAGOGASTRODUODENOSCOPY (EGD) N/A 5/22/2017    Performed by Harjeet Jones MD at [facility] (2ND F[facility])       Social History     Socioeconomic History    Marital status: Single     Spouse name: Not on file    Number of children: Not on file    Years of education: Not on file    Highest education level: Not on file   Social Needs    Financial resource strain: Not on file    Food insecurity - worry: Not on file    Food insecurity - inability: Not on file    Transportation needs - medical:  Not on file    Transportation needs - non-medical: Not on file   Occupational History    Not on file   Tobacco Use    Smoking status: Never Smoker   Substance and Sexual Activity    Alcohol use: No    Drug use: No    Sexual activity: Not on file   Other Topics Concern    Not on file   Social History Narrative    Not on file       OBJECTIVE:     Vital Signs Range (Last 24H):         Significant Labs:  Lab Results   Component Value Date    WBC 7.91 12/26/2018    HGB 16.6 12/26/2018    HCT 52.2 12/26/2018     12/26/2018    ALT 46 (H) 12/26/2018    AST 27 12/26/2018     12/26/2018    K 3.8 12/26/2018     12/26/2018    CREATININE 1.4 12/26/2018    BUN 23 (H) 12/26/2018    CO2 25 12/26/2018       Diagnostic Studies: No relevant studies.    EKG: No recent studies available.    2D ECHO:  No results found for this or any previous visit.      ASSESSMENT/PLAN:       Anesthesia Evaluation    I have reviewed the Patient Summary Reports.     I have reviewed the Medications.     Review of Systems  Anesthesia Hx:  No problems with previous Anesthesia  History of prior surgery of interest to airway management or planning:  Denies Personal Hx of Anesthesia complications.   Social:  Non-Smoker, No Alcohol Use    Hematology/Oncology:  Hematology Normal   Oncology Normal     EENT/Dental:EENT/Dental Normal   Cardiovascular:  Cardiovascular Normal     Pulmonary:  Pulmonary Normal    Renal/:  Renal/ Normal     Hepatic/GI:  Hepatic/GI Normal    Musculoskeletal:  Musculoskeletal Normal    Neurological:  Neurology Normal    Endocrine:  Endocrine Normal    Dermatological:  Skin Normal    Psych:  Psychiatric Normal           Physical Exam  General:  Well nourished    Airway/Jaw/Neck:  Airway Findings: Mouth Opening: Normal Tongue: Normal  General Airway Assessment: Adult  Mallampati: II  Improves to II with phonation.  TM Distance: Normal, at least 6 cm  Jaw/Neck Findings:  Neck ROM: Normal ROM       Dental:  Dental Findings: In tact   Chest/Lungs:  Chest/Lungs Findings: Clear to auscultation, Normal Respiratory Rate     Heart/Vascular:  Heart Findings: Rate: Normal  Rhythm: Regular Rhythm  Sounds: Normal             Anesthesia Plan  Type of Anesthesia, risks & benefits discussed:  Anesthesia Type:  general  Patient's Preference: General  Intra-op Monitoring Plan: standard ASA monitors  Intra-op Monitoring Plan Comments:   Post Op Pain Control Plan: per primary service following discharge from PACU, IV/PO Opioids PRN and multimodal analgesia  Post Op Pain Control Plan Comments:   Induction:   IV  Beta Blocker:  Patient is not currently on a Beta-Blocker (No further documentation required).       Informed Consent: Patient understands risks and agrees with Anesthesia plan.  Questions answered. Anesthesia consent signed with patient.  ASA Score: 2     Day of Surgery Review of History & Physical: I have interviewed and examined the patient. I have reviewed the patient's H&P dated:  There are no significant changes.          Ready For Surgery From Anesthesia Perspective.

## 2019-01-07 ENCOUNTER — ANESTHESIA (OUTPATIENT)
Dept: SURGERY | Facility: HOSPITAL | Age: 44
End: 2019-01-07
Payer: MEDICAID

## 2019-01-07 ENCOUNTER — HOSPITAL ENCOUNTER (OUTPATIENT)
Facility: HOSPITAL | Age: 44
Discharge: HOME | End: 2019-01-07
Attending: SURGERY | Admitting: SURGERY
Payer: MEDICAID

## 2019-01-07 VITALS
RESPIRATION RATE: 16 BRPM | HEIGHT: 77 IN | SYSTOLIC BLOOD PRESSURE: 100 MMHG | BODY MASS INDEX: 25.39 KG/M2 | DIASTOLIC BLOOD PRESSURE: 61 MMHG | WEIGHT: 215 LBS | HEART RATE: 55 BPM | TEMPERATURE: 98 F | OXYGEN SATURATION: 96 %

## 2019-01-07 DIAGNOSIS — K40.90 RIGHT INGUINAL HERNIA: Primary | ICD-10-CM

## 2019-01-07 PROCEDURE — 36000706: Performed by: SURGERY

## 2019-01-07 PROCEDURE — 49505 PRP I/HERN INIT REDUC >5 YR: CPT | Mod: RT,,, | Performed by: SURGERY

## 2019-01-07 PROCEDURE — 37000008 HC ANESTHESIA 1ST 15 MINUTES: Performed by: SURGERY

## 2019-01-07 PROCEDURE — 63600175 PHARM REV CODE 636 W HCPCS: Performed by: ANESTHESIOLOGY

## 2019-01-07 PROCEDURE — 71000039 HC RECOVERY, EACH ADD'L HOUR: Performed by: SURGERY

## 2019-01-07 PROCEDURE — 25000003 PHARM REV CODE 250: Performed by: STUDENT IN AN ORGANIZED HEALTH CARE EDUCATION/TRAINING PROGRAM

## 2019-01-07 PROCEDURE — 00830 ANES HERNIA RPR LWR ABD NOS: CPT | Performed by: SURGERY

## 2019-01-07 PROCEDURE — 37000009 HC ANESTHESIA EA ADD 15 MINS: Performed by: SURGERY

## 2019-01-07 PROCEDURE — 88302 TISSUE SPECIMEN TO PATHOLOGY - SURGERY: ICD-10-PCS | Mod: 26,,, | Performed by: PATHOLOGY

## 2019-01-07 PROCEDURE — 63600175 PHARM REV CODE 636 W HCPCS: Performed by: STUDENT IN AN ORGANIZED HEALTH CARE EDUCATION/TRAINING PROGRAM

## 2019-01-07 PROCEDURE — D9220A PRA ANESTHESIA: ICD-10-PCS | Mod: ,,, | Performed by: ANESTHESIOLOGY

## 2019-01-07 PROCEDURE — S0020 INJECTION, BUPIVICAINE HYDRO: HCPCS | Performed by: SURGERY

## 2019-01-07 PROCEDURE — 88302 TISSUE EXAM BY PATHOLOGIST: CPT | Performed by: PATHOLOGY

## 2019-01-07 PROCEDURE — 63600175 PHARM REV CODE 636 W HCPCS: Performed by: PHYSICIAN ASSISTANT

## 2019-01-07 PROCEDURE — D9220A PRA ANESTHESIA: Mod: ,,, | Performed by: ANESTHESIOLOGY

## 2019-01-07 PROCEDURE — 49505 PR REPAIR ING HERNIA,5+Y/O,REDUCIBL: ICD-10-PCS | Mod: RT,,, | Performed by: SURGERY

## 2019-01-07 PROCEDURE — 88302 TISSUE EXAM BY PATHOLOGIST: CPT | Mod: 26,,, | Performed by: PATHOLOGY

## 2019-01-07 PROCEDURE — 71000015 HC POSTOP RECOV 1ST HR: Performed by: SURGERY

## 2019-01-07 PROCEDURE — 25000003 PHARM REV CODE 250: Performed by: SURGERY

## 2019-01-07 PROCEDURE — 36000707: Performed by: SURGERY

## 2019-01-07 PROCEDURE — 71000033 HC RECOVERY, INTIAL HOUR: Performed by: SURGERY

## 2019-01-07 PROCEDURE — 25000003 PHARM REV CODE 250: Performed by: PHYSICIAN ASSISTANT

## 2019-01-07 PROCEDURE — C1781 MESH (IMPLANTABLE): HCPCS | Performed by: SURGERY

## 2019-01-07 DEVICE — MESH PROLENE 3INX6IN 6/BX: Type: IMPLANTABLE DEVICE | Site: GROIN | Status: FUNCTIONAL

## 2019-01-07 RX ORDER — SODIUM CHLORIDE 9 MG/ML
INJECTION, SOLUTION INTRAVENOUS CONTINUOUS
Status: DISCONTINUED | OUTPATIENT
Start: 2019-01-07 | End: 2019-01-07 | Stop reason: HOSPADM

## 2019-01-07 RX ORDER — PHENYLEPHRINE HYDROCHLORIDE 10 MG/ML
INJECTION INTRAVENOUS
Status: DISCONTINUED | OUTPATIENT
Start: 2019-01-07 | End: 2019-01-07

## 2019-01-07 RX ORDER — MIDAZOLAM HYDROCHLORIDE 1 MG/ML
INJECTION, SOLUTION INTRAMUSCULAR; INTRAVENOUS
Status: DISCONTINUED | OUTPATIENT
Start: 2019-01-07 | End: 2019-01-07

## 2019-01-07 RX ORDER — ONDANSETRON 2 MG/ML
INJECTION INTRAMUSCULAR; INTRAVENOUS
Status: DISCONTINUED | OUTPATIENT
Start: 2019-01-07 | End: 2019-01-07

## 2019-01-07 RX ORDER — KETAMINE HYDROCHLORIDE 10 MG/ML
INJECTION, SOLUTION INTRAMUSCULAR; INTRAVENOUS
Status: DISCONTINUED | OUTPATIENT
Start: 2019-01-07 | End: 2019-01-07

## 2019-01-07 RX ORDER — SODIUM CHLORIDE 0.9 % (FLUSH) 0.9 %
3 SYRINGE (ML) INJECTION
Status: DISCONTINUED | OUTPATIENT
Start: 2019-01-07 | End: 2019-01-07 | Stop reason: HOSPADM

## 2019-01-07 RX ORDER — BUPIVACAINE HYDROCHLORIDE 5 MG/ML
INJECTION, SOLUTION EPIDURAL; INTRACAUDAL
Status: DISCONTINUED | OUTPATIENT
Start: 2019-01-07 | End: 2019-01-07 | Stop reason: HOSPADM

## 2019-01-07 RX ORDER — KETOROLAC TROMETHAMINE 30 MG/ML
INJECTION, SOLUTION INTRAMUSCULAR; INTRAVENOUS
Status: DISCONTINUED | OUTPATIENT
Start: 2019-01-07 | End: 2019-01-07

## 2019-01-07 RX ORDER — OXYCODONE AND ACETAMINOPHEN 5; 325 MG/1; MG/1
1 TABLET ORAL EVERY 4 HOURS PRN
Status: DISCONTINUED | OUTPATIENT
Start: 2019-01-07 | End: 2019-01-07 | Stop reason: HOSPADM

## 2019-01-07 RX ORDER — CEFAZOLIN SODIUM 1 G/3ML
2 INJECTION, POWDER, FOR SOLUTION INTRAMUSCULAR; INTRAVENOUS
Status: COMPLETED | OUTPATIENT
Start: 2019-01-07 | End: 2019-01-07

## 2019-01-07 RX ORDER — LIDOCAINE HCL/PF 100 MG/5ML
SYRINGE (ML) INTRAVENOUS
Status: DISCONTINUED | OUTPATIENT
Start: 2019-01-07 | End: 2019-01-07

## 2019-01-07 RX ORDER — ONDANSETRON 2 MG/ML
4 INJECTION INTRAMUSCULAR; INTRAVENOUS DAILY PRN
Status: DISCONTINUED | OUTPATIENT
Start: 2019-01-07 | End: 2019-01-07 | Stop reason: HOSPADM

## 2019-01-07 RX ORDER — ACETAMINOPHEN 10 MG/ML
INJECTION, SOLUTION INTRAVENOUS
Status: DISCONTINUED | OUTPATIENT
Start: 2019-01-07 | End: 2019-01-07

## 2019-01-07 RX ORDER — OXYCODONE AND ACETAMINOPHEN 5; 325 MG/1; MG/1
1 TABLET ORAL EVERY 4 HOURS PRN
Qty: 35 TABLET | Refills: 0 | Status: SHIPPED | OUTPATIENT
Start: 2019-01-07 | End: 2021-01-08

## 2019-01-07 RX ORDER — PROPOFOL 10 MG/ML
VIAL (ML) INTRAVENOUS
Status: DISCONTINUED | OUTPATIENT
Start: 2019-01-07 | End: 2019-01-07

## 2019-01-07 RX ORDER — ROCURONIUM BROMIDE 10 MG/ML
INJECTION, SOLUTION INTRAVENOUS
Status: DISCONTINUED | OUTPATIENT
Start: 2019-01-07 | End: 2019-01-07

## 2019-01-07 RX ORDER — HYDROMORPHONE HYDROCHLORIDE 1 MG/ML
0.2 INJECTION, SOLUTION INTRAMUSCULAR; INTRAVENOUS; SUBCUTANEOUS EVERY 5 MIN PRN
Status: DISCONTINUED | OUTPATIENT
Start: 2019-01-07 | End: 2019-01-07 | Stop reason: HOSPADM

## 2019-01-07 RX ORDER — NEOSTIGMINE METHYLSULFATE 1 MG/ML
INJECTION, SOLUTION INTRAVENOUS
Status: DISCONTINUED | OUTPATIENT
Start: 2019-01-07 | End: 2019-01-07

## 2019-01-07 RX ORDER — FENTANYL CITRATE 50 UG/ML
25 INJECTION, SOLUTION INTRAMUSCULAR; INTRAVENOUS EVERY 5 MIN PRN
Status: DISCONTINUED | OUTPATIENT
Start: 2019-01-07 | End: 2019-01-07 | Stop reason: HOSPADM

## 2019-01-07 RX ORDER — LORAZEPAM 2 MG/ML
0.25 INJECTION INTRAMUSCULAR ONCE AS NEEDED
Status: DISCONTINUED | OUTPATIENT
Start: 2019-01-07 | End: 2019-01-07 | Stop reason: HOSPADM

## 2019-01-07 RX ORDER — FENTANYL CITRATE 50 UG/ML
INJECTION, SOLUTION INTRAMUSCULAR; INTRAVENOUS
Status: DISCONTINUED | OUTPATIENT
Start: 2019-01-07 | End: 2019-01-07

## 2019-01-07 RX ORDER — GLYCOPYRROLATE 0.2 MG/ML
INJECTION INTRAMUSCULAR; INTRAVENOUS
Status: DISCONTINUED | OUTPATIENT
Start: 2019-01-07 | End: 2019-01-07

## 2019-01-07 RX ORDER — SUCCINYLCHOLINE CHLORIDE 20 MG/ML
INJECTION INTRAMUSCULAR; INTRAVENOUS
Status: DISCONTINUED | OUTPATIENT
Start: 2019-01-07 | End: 2019-01-07

## 2019-01-07 RX ADMIN — HYDROMORPHONE HYDROCHLORIDE 0.2 MG: 1 INJECTION, SOLUTION INTRAMUSCULAR; INTRAVENOUS; SUBCUTANEOUS at 11:01

## 2019-01-07 RX ADMIN — ROCURONIUM BROMIDE 10 MG: 10 INJECTION, SOLUTION INTRAVENOUS at 09:01

## 2019-01-07 RX ADMIN — ONDANSETRON 4 MG: 2 INJECTION INTRAMUSCULAR; INTRAVENOUS at 10:01

## 2019-01-07 RX ADMIN — PHENYLEPHRINE HYDROCHLORIDE 100 MCG: 10 INJECTION INTRAVENOUS at 09:01

## 2019-01-07 RX ADMIN — SUCCINYLCHOLINE CHLORIDE 200 MG: 20 INJECTION, SOLUTION INTRAMUSCULAR; INTRAVENOUS at 09:01

## 2019-01-07 RX ADMIN — OXYCODONE HYDROCHLORIDE AND ACETAMINOPHEN 1 TABLET: 5; 325 TABLET ORAL at 10:01

## 2019-01-07 RX ADMIN — ACETAMINOPHEN 1000 MG: 10 INJECTION, SOLUTION INTRAVENOUS at 09:01

## 2019-01-07 RX ADMIN — KETAMINE HYDROCHLORIDE 30 MG: 10 INJECTION, SOLUTION INTRAMUSCULAR; INTRAVENOUS at 09:01

## 2019-01-07 RX ADMIN — KETOROLAC TROMETHAMINE 30 MG: 30 INJECTION, SOLUTION INTRAMUSCULAR; INTRAVENOUS at 10:01

## 2019-01-07 RX ADMIN — GLYCOPYRROLATE 0.4 MG: 0.2 INJECTION, SOLUTION INTRAMUSCULAR; INTRAVENOUS at 10:01

## 2019-01-07 RX ADMIN — MIDAZOLAM HYDROCHLORIDE 2 MG: 1 INJECTION, SOLUTION INTRAMUSCULAR; INTRAVENOUS at 09:01

## 2019-01-07 RX ADMIN — SODIUM CHLORIDE, SODIUM GLUCONATE, SODIUM ACETATE, POTASSIUM CHLORIDE, MAGNESIUM CHLORIDE, SODIUM PHOSPHATE, DIBASIC, AND POTASSIUM PHOSPHATE: .53; .5; .37; .037; .03; .012; .00082 INJECTION, SOLUTION INTRAVENOUS at 09:01

## 2019-01-07 RX ADMIN — FENTANYL CITRATE 100 MCG: 50 INJECTION, SOLUTION INTRAMUSCULAR; INTRAVENOUS at 09:01

## 2019-01-07 RX ADMIN — LIDOCAINE HYDROCHLORIDE 80 MG: 20 INJECTION, SOLUTION INTRAVENOUS at 09:01

## 2019-01-07 RX ADMIN — HYDROMORPHONE HYDROCHLORIDE 0.2 MG: 1 INJECTION, SOLUTION INTRAMUSCULAR; INTRAVENOUS; SUBCUTANEOUS at 10:01

## 2019-01-07 RX ADMIN — PROPOFOL 200 MG: 10 INJECTION, EMULSION INTRAVENOUS at 09:01

## 2019-01-07 RX ADMIN — SODIUM CHLORIDE: 0.9 INJECTION, SOLUTION INTRAVENOUS at 08:01

## 2019-01-07 RX ADMIN — SODIUM CHLORIDE: 0.9 INJECTION, SOLUTION INTRAVENOUS at 09:01

## 2019-01-07 RX ADMIN — CEFAZOLIN 2 G: 330 INJECTION, POWDER, FOR SOLUTION INTRAMUSCULAR; INTRAVENOUS at 09:01

## 2019-01-07 RX ADMIN — NEOSTIGMINE METHYLSULFATE 4 MG: 1 INJECTION INTRAVENOUS at 10:01

## 2019-01-07 NOTE — DISCHARGE INSTRUCTIONS
Remove dressing in 48 hours    After Laparoscopic Hernia Repair  You had a procedure called laparoscopic hernia repair. A hernia is a defect in the tough tissue covering the musculature of the abdominal wall (fascia). During laparoscopic hernia surgery, a surgeon inserts a telescope attached to a camera as well as surgical instruments through tiny incisions in your abdomen. The surgeon repairs the hernia with a mesh, which patches the tear or weakness in the fascia.  Home care  · Note that your shoulder may feel tight or your neck may be stiff for 24 to 48 hours after your surgery. This is common and usually lasts a short time. You may also have numbness around the incision area.  · Keep doing the coughing and deep breathing exercises that you learned in the hospital. These will help to prevent lung infection.  · Prevent constipation so you dont strain when going to the bathroom. Eat fruits, vegetables, and whole grains. Drink 6 to 8 glasses of water a day, unless otherwise directed. Use a laxative or a mild stool softener if your healthcare provider says its OK.  · Wash your incision with mild soap and water. Pat it dry. Dont use oil, powder, or lotion on your incision.  · Shower or take baths as instructed by your healthcare provider. Instructions will vary based on how your incision was closed and how its healing. It may be closed with glue, sutures, or staples. Your healthcare provider may have different advice for each kind.  Activity  · Ask others to help with chores and errands while you recover.  · Dont lift anything heavier than 10 pounds until your healthcare provider says its OK.  · Dont mow the lawn, use a vacuum , or do other strenuous activities until your healthcare provider says it's OK.  · Climb stairs slowly and pause after every few steps.  · Walk as often as you feel able.  · Ask your healthcare provider when you can drive again. This may be when you stop taking pain medicine and  can move comfortably from side to side. Dont drive if you are still taking opioid pain medicine.  When to call your healthcare provider   Call your healthcare provider right away if you have any of the following:  · Pain, bleeding, redness, or fluid at the incision site that gets worse  · Fever of 100.4°F (38°C) or higher, or as directed by your healthcare provider  · Vomiting or nausea that doesnt go away  · Inability to urinate  · No bowel movement after 3 days  · Swelling in abdomen or groin that gets worse  · Pain thats not relieved by medicine   Date Last Reviewed: 10/1/2016  © 3471-1233 [employer]. [address] [place] [address], [place] [address]. All rights reserved. This information is not intended as a substitute for professional medical care. Always follow your healthcare professional's instructions.

## 2019-01-07 NOTE — PLAN OF CARE
Discharge instructions given and educated on symptoms to call the MD or come to ER for, patient verbalized understanding. Consents in chart, Vitals stable, no complaints.

## 2019-01-07 NOTE — PLAN OF CARE
Patient assigned to this writer by charge nurse. The patient was  escorted to [facility] room 10 by PCT. The patient is currently  changing into a hospital gown. This writer is completing a chart review and reviewing MD orders.

## 2019-01-07 NOTE — BRIEF OP NOTE
[facility]  Brief Operative Note     SUMMARY     Surgery Date: 1/7/2019     Surgeon(s) and Role:     * Mele Delcid MD - Primary     * Bartolo Arnett MD - Resident - Assisting        Pre-op Diagnosis:  Right inguinal hernia [K40.90]    Post-op Diagnosis:  Post-Op Diagnosis Codes:     * Right inguinal hernia [K40.90]    Procedure(s) (LRB):  REPAIR, HERNIA, INGUINAL, WITHOUT HISTORY OF PRIOR REPAIR, AGE 5 YEARS OR OLDER, open with mesh (Right)    Anesthesia: General    Description of the findings of the procedure: Right inguinal hernia repair with mesh without apparent complication    Findings/Key Components: indirect RIH    Estimated Blood Loss: 5cc         Specimens:   Specimen (12h ago, onward)    Start     Ordered    01/07/19 0955  Specimen to Pathology - Surgery  Once     Comments:  1.  Hernia sac -- PERMANENT     Start Status   01/07/19 0955 Collected (01/07/19 0955)       01/07/19 0955          Discharge Note    SUMMARY     Admit Date: 1/7/2019    Discharge Date and Time:  01/07/2019 10:36 AM    Hospital Course (synopsis of major diagnoses, care, treatment, and services provided during the course of the hospital stay):     The patient came into the hospital for an outpatient procedure, tolerated it well, and was discharged in good condition from the recovery area with the below follow-up, instructions, and medications.       Final Diagnosis: Post-Op Diagnosis Codes:     * Right inguinal hernia [K40.90]    Disposition: Home or Self Care    Follow Up/Patient Instructions:     Medications:  Reconciled Home Medications:      Medication List      START taking these medications    oxyCODONE-acetaminophen 5-325 mg per tablet  Commonly known as:  PERCOCET  Take 1 tablet by mouth every 4 (four) hours as needed for Pain.        CONTINUE taking these medications    dextroamphetamine-amphetamine 30 MG 24 hr capsule  Commonly known as:  ADDERALL XR  Take 30 mg by mouth every morning.      HYDROcodone-acetaminophen 5-325 mg per tablet  Commonly known as:  NORCO  Take 1 tablet by mouth every 4 (four) hours as needed for Pain.     KlonoPIN 1 MG tablet  Generic drug:  clonazePAM  Take 1 mg by mouth nightly as needed for Anxiety.          Discharge Procedure Orders   Call MD for:  temperature >100.4     Call MD for:  persistent nausea and vomiting or diarrhea     Call MD for:  severe uncontrolled pain     Call MD for:  difficulty breathing or increased cough     Remove dressing in 48 hours     Activity as tolerated     Shower on day dressing removed (No bath)   Order Comments: Remove dressing and shower after 2 days     Weight bearing restrictions (specify):   Order Comments: Nothing greater than 10Lbs for 6 weeks     Follow-up Information     Mele Delcid MD. Schedule an appointment as soon as possible for a visit in 2 weeks.    Specialties:  General Surgery, Surgery  Why:  For wound re-check  Contact information:  Shelley DO  [facility] [id number]  [phone]

## 2019-01-07 NOTE — INTERVAL H&P NOTE
The patient has been examined and the H&P has been reviewed:    No changes to H&P from 12/14/18    Anesthesia/Surgery risks, benefits and alternative options discussed and understood by patient/family.          Active Hospital Problems    Diagnosis  POA    Right inguinal hernia [K40.90]  Yes      Resolved Hospital Problems   No resolved problems to display.

## 2019-01-07 NOTE — TRANSFER OF CARE
"Anesthesia Transfer of Care Note    Patient: Farzad Moore    Procedure(s) Performed: Procedure(s) (LRB):  REPAIR, HERNIA, INGUINAL, WITHOUT HISTORY OF PRIOR REPAIR, AGE 5 YEARS OR OLDER, open with mesh (Right)    Patient location: PACU    Anesthesia Type: general    Transport from OR: Transported from OR on 6-10 L/min O2 by face mask with adequate spontaneous ventilation    Post pain: adequate analgesia    Post assessment: no apparent anesthetic complications    Post vital signs: stable    Level of consciousness: awake and alert    Nausea/Vomiting: no nausea/vomiting    Complications: none    Transfer of care protocol was followed      Last vitals:   Visit Vitals  /70   Pulse 70   Temp 36.7 °C (98 °F)   Resp 16   Ht 6' 5" (1.956 m)   Wt 97.5 kg (215 lb)   SpO2 100%   BMI 25.50 kg/m²     "

## 2019-01-07 NOTE — OP NOTE
DATE OF PROCEDURE:  01/07/2019    PREOPERATIVE DIAGNOSIS:  Right inguinal hernia.    POSTOPERATIVE DIAGNOSIS:  Right inguinal hernia.    PROCEDURE PERFORMED:  Right inguinal hernia repair with mesh.    SURGEON:  Mele Delcid M.D.    ASSISTANT:  Snehal Arnett M.D. (RES)    ANESTHESIA:  General.    BLOOD LOSS:  Minimal.    COMPLICATIONS:  None.    INDICATIONS:  This is a 43-year-old patient with an enlarging symptomatic right   inguinal hernia.    OPERATIVE REPORT IN DETAIL:  The patient was brought to the Operating Room,   placed in the supine position and prepped and draped in sterile fashion.  Once   satisfactory general anesthesia was induced, a transverse incision was made in   the groin about a fingerbreadth above the pubic tubercle on the right and   extended out toward the anterior-superior iliac spine.  Skin and subcutaneous   tissues were divided down to the external oblique fibers, which were then opened   in the direction of their fibers.  The ilioinguinal nerve was identified   embedded in the cremasteric muscle fibers and concern about potential entrapment   led to a decision to divide this nerve.  The spermatic cord contents along with   a moderate sized indirect hernia sac were encircled at the pubic tubercle.  The   cremasteric fibers were dissected off of these structures.  Cord lipoma was    initially and dissected back to the internal ring where it was   clamped, divided and then ligated with a 2-0 silk.  The hernia sac was opened to   ensure that it contained no visceral contents.  A high ligation was performed   at the internal ring with a 2-0 silk ligature.  The cord contents were retracted   cephalad.  Prolene mesh was secured to the pubic tubercle and a running 0   Prolene suture was used to approximate the mesh to the shelving edge of the   inguinal ligament.  The mesh was slit laterally to recreate the internal ring.    About a half dozen tacking sutures of 0 Prolene were  utilized to secure the mesh   medially to the lateral edge of the rectus and to the transversalis fascia.    The internal ring was recreated by suturing the 2 divided edges of the lateral   portion of mesh together.  With this completed, hemostasis was evaluated and   considered excellent.  The external oblique fibers were reapproximated with a   running 2-0 Vicryl suture.  An ilioinguinal and iliohypogastric nerve block was   performed just medial to the anterior-superior iliac spine.  The overlying soft   tissues were then closed in 2 layers of absorbable suture.  Needle, sponge and   instrument counts were correct.  The patient tolerated the procedure well and   was stable at the completion of the operation.      GF/IN  dd: 01/07/2019 10:14:49 (CST)  td: 01/07/2019 11:34:08 (CST)  Doc ID   #[id number]  Job ID #[id number]    CC:

## 2019-01-08 NOTE — ANESTHESIA POSTPROCEDURE EVALUATION
"Anesthesia Post Evaluation    Patient: Farzad Moore    Procedure(s) Performed: Procedure(s) (LRB):  REPAIR, HERNIA, INGUINAL, WITHOUT HISTORY OF PRIOR REPAIR, AGE 5 YEARS OR OLDER, open with mesh (Right)    Final Anesthesia Type: general  Patient location during evaluation: PACU  Patient participation: Yes- Able to Participate  Level of consciousness: awake and alert  Post-procedure vital signs: reviewed and stable  Pain management: adequate  Airway patency: patent  PONV status at discharge: No PONV  Anesthetic complications: no      Cardiovascular status: blood pressure returned to baseline and stable  Respiratory status: unassisted  Hydration status: euvolemic  Follow-up not needed.        Visit Vitals  /61   Pulse (!) 55   Temp 36.8 °C (98.2 °F) (Temporal)   Resp 16   Ht 6' 5" (1.956 m)   Wt 97.5 kg (215 lb)   SpO2 96%   BMI 25.50 kg/m²       Pain/Shirley Score: Pain Rating Prior to Med Admin: 6 (1/7/2019 11:29 AM)  Pain Rating Post Med Admin: 5 (1/7/2019 11:31 AM)  Shirley Score: 10 (1/7/2019 11:51 AM)        "

## 2019-01-10 ENCOUNTER — TELEPHONE (OUTPATIENT)
Dept: SURGERY | Facility: CLINIC | Age: 44
End: 2019-01-10

## 2019-01-10 NOTE — TELEPHONE ENCOUNTER
----- Message from Kelly Wang sent at 1/10/2019 10:47 AM CST -----  Contact: Patient   Needs Advice    Reason for call: Patient states that they need a sooner appointment than I can provide         Communication Preference: [phone]    Additional Information: please contact the patient to accommodate them

## 2019-01-10 NOTE — TELEPHONE ENCOUNTER
Phoned patient and informed him of the post op appointment and he said that it is fine and he accepted it.

## 2019-01-16 ENCOUNTER — TELEPHONE (OUTPATIENT)
Dept: SURGERY | Facility: CLINIC | Age: 44
End: 2019-01-16

## 2019-01-16 NOTE — TELEPHONE ENCOUNTER
----- Message from Nikita Edward sent at 1/16/2019  8:48 AM CST -----  Needs Advice    Reason for call:        Communication Preference:[phone]    Additional Information:Pt calling to speak with nurse to ask post op questions.

## 2019-01-16 NOTE — TELEPHONE ENCOUNTER
Returned call to patient and questions answered regarding having sex.  Also asked if his appt could be moved from 1045 AM to 1330 PM.  Can move appt to 1330 PM.

## 2019-01-24 ENCOUNTER — OFFICE VISIT (OUTPATIENT)
Dept: SURGERY | Facility: CLINIC | Age: 44
End: 2019-01-24
Payer: MEDICAID

## 2019-01-24 VITALS
SYSTOLIC BLOOD PRESSURE: 131 MMHG | HEIGHT: 76 IN | HEART RATE: 80 BPM | BODY MASS INDEX: 26.58 KG/M2 | WEIGHT: 218.25 LBS | DIASTOLIC BLOOD PRESSURE: 77 MMHG | TEMPERATURE: 98 F

## 2019-01-24 DIAGNOSIS — K40.90 INGUINAL HERNIA OF LEFT SIDE WITHOUT OBSTRUCTION OR GANGRENE: Primary | ICD-10-CM

## 2019-01-24 PROCEDURE — 99999 PR PBB SHADOW E&M-EST. PATIENT-LVL III: CPT | Mod: PBBFAC,,, | Performed by: SURGERY

## 2019-01-24 PROCEDURE — 99999 PR PBB SHADOW E&M-EST. PATIENT-LVL III: ICD-10-PCS | Mod: PBBFAC,,, | Performed by: SURGERY

## 2019-01-24 PROCEDURE — 99024 PR POST-OP FOLLOW-UP VISIT: ICD-10-PCS | Mod: ,,, | Performed by: SURGERY

## 2019-01-24 PROCEDURE — 99024 POSTOP FOLLOW-UP VISIT: CPT | Mod: ,,, | Performed by: SURGERY

## 2019-01-24 PROCEDURE — 99213 OFFICE O/P EST LOW 20 MIN: CPT | Mod: PBBFAC | Performed by: SURGERY

## 2019-01-24 NOTE — PROGRESS NOTES
S/p right inguinal hernia repair  Doing well  Well healed  Feeling achy sounds like flu symptoms which started about 8 days after surgery  Nothing to suggest a post op problem  Son is sick at home with URI  Patient reassured  He understands the need to limit strenuous activity for another 4 weeks

## 2020-06-25 ENCOUNTER — TELEPHONE (OUTPATIENT)
Dept: SURGERY | Facility: CLINIC | Age: 45
End: 2020-06-25

## 2020-06-25 NOTE — TELEPHONE ENCOUNTER
Tried calling patient regarding appointment with Dr. Delcid, [facility] today no answer.      Magali Teresa

## 2021-01-08 ENCOUNTER — LAB VISIT (OUTPATIENT)
Dept: LAB | Facility: HOSPITAL | Age: 46
End: 2021-01-08
Attending: INTERNAL MEDICINE
Payer: MEDICAID

## 2021-01-08 ENCOUNTER — OFFICE VISIT (OUTPATIENT)
Dept: INTERNAL MEDICINE | Facility: CLINIC | Age: 46
End: 2021-01-08
Payer: MEDICAID

## 2021-01-08 VITALS
DIASTOLIC BLOOD PRESSURE: 74 MMHG | RESPIRATION RATE: 18 BRPM | WEIGHT: 233.69 LBS | BODY MASS INDEX: 27.59 KG/M2 | HEIGHT: 77 IN | OXYGEN SATURATION: 98 % | SYSTOLIC BLOOD PRESSURE: 130 MMHG | TEMPERATURE: 97 F | HEART RATE: 104 BPM

## 2021-01-08 DIAGNOSIS — Z13.1 SCREENING FOR DIABETES MELLITUS: ICD-10-CM

## 2021-01-08 DIAGNOSIS — Z00.00 PREVENTATIVE HEALTH CARE: ICD-10-CM

## 2021-01-08 DIAGNOSIS — F90.2 ATTENTION DEFICIT HYPERACTIVITY DISORDER (ADHD), COMBINED TYPE: ICD-10-CM

## 2021-01-08 DIAGNOSIS — Z00.00 PREVENTATIVE HEALTH CARE: Primary | ICD-10-CM

## 2021-01-08 PROBLEM — F90.9 ADHD: Status: ACTIVE | Noted: 2021-01-08

## 2021-01-08 LAB
ALBUMIN SERPL BCP-MCNC: 4.3 G/DL (ref 3.5–5.2)
ALP SERPL-CCNC: 78 U/L (ref 55–135)
ALT SERPL W/O P-5'-P-CCNC: 26 U/L (ref 10–44)
ANION GAP SERPL CALC-SCNC: 9 MMOL/L (ref 8–16)
AST SERPL-CCNC: 22 U/L (ref 10–40)
BILIRUB DIRECT SERPL-MCNC: 0.1 MG/DL (ref 0.1–0.3)
BILIRUB SERPL-MCNC: 0.4 MG/DL (ref 0.1–1)
BUN SERPL-MCNC: 17 MG/DL (ref 6–20)
CALCIUM SERPL-MCNC: 9.4 MG/DL (ref 8.7–10.5)
CHLORIDE SERPL-SCNC: 100 MMOL/L (ref 95–110)
CHOLEST SERPL-MCNC: 196 MG/DL (ref 120–199)
CHOLEST/HDLC SERPL: 5.3 {RATIO} (ref 2–5)
CO2 SERPL-SCNC: 27 MMOL/L (ref 23–29)
CREAT SERPL-MCNC: 1.2 MG/DL (ref 0.5–1.4)
EST. GFR  (AFRICAN AMERICAN): >60 ML/MIN/1.73 M^2
EST. GFR  (NON AFRICAN AMERICAN): >60 ML/MIN/1.73 M^2
ESTIMATED AVG GLUCOSE: 91 MG/DL (ref 68–131)
GLUCOSE SERPL-MCNC: 91 MG/DL (ref 70–110)
HBA1C MFR BLD HPLC: 4.8 % (ref 4–5.6)
HDLC SERPL-MCNC: 37 MG/DL (ref 40–75)
HDLC SERPL: 18.9 % (ref 20–50)
LDLC SERPL CALC-MCNC: 107.2 MG/DL (ref 63–159)
NONHDLC SERPL-MCNC: 159 MG/DL
POTASSIUM SERPL-SCNC: 4.2 MMOL/L (ref 3.5–5.1)
PROT SERPL-MCNC: 7.7 G/DL (ref 6–8.4)
SODIUM SERPL-SCNC: 136 MMOL/L (ref 136–145)
TRIGL SERPL-MCNC: 259 MG/DL (ref 30–150)
TSH SERPL DL<=0.005 MIU/L-ACNC: 1.28 UIU/ML (ref 0.4–4)

## 2021-01-08 PROCEDURE — 99999 PR PBB SHADOW E&M-EST. PATIENT-LVL III: ICD-10-PCS | Mod: PBBFAC,,, | Performed by: INTERNAL MEDICINE

## 2021-01-08 PROCEDURE — 36415 COLL VENOUS BLD VENIPUNCTURE: CPT | Mod: PO

## 2021-01-08 PROCEDURE — 80076 HEPATIC FUNCTION PANEL: CPT

## 2021-01-08 PROCEDURE — 80061 LIPID PANEL: CPT

## 2021-01-08 PROCEDURE — 99386 PR PREVENTIVE VISIT,NEW,40-64: ICD-10-PCS | Mod: 25,S$PBB,, | Performed by: INTERNAL MEDICINE

## 2021-01-08 PROCEDURE — 90471 IMMUNIZATION ADMIN: CPT | Mod: PBBFAC,PO

## 2021-01-08 PROCEDURE — 83036 HEMOGLOBIN GLYCOSYLATED A1C: CPT

## 2021-01-08 PROCEDURE — 84443 ASSAY THYROID STIM HORMONE: CPT

## 2021-01-08 PROCEDURE — 99386 PREV VISIT NEW AGE 40-64: CPT | Mod: 25,S$PBB,, | Performed by: INTERNAL MEDICINE

## 2021-01-08 PROCEDURE — 86803 HEPATITIS C AB TEST: CPT

## 2021-01-08 PROCEDURE — 86703 HIV-1/HIV-2 1 RESULT ANTBDY: CPT

## 2021-01-08 PROCEDURE — 80048 BASIC METABOLIC PNL TOTAL CA: CPT

## 2021-01-08 PROCEDURE — 86592 SYPHILIS TEST NON-TREP QUAL: CPT

## 2021-01-08 PROCEDURE — 99213 OFFICE O/P EST LOW 20 MIN: CPT | Mod: PBBFAC,25,PO | Performed by: INTERNAL MEDICINE

## 2021-01-08 PROCEDURE — 99999 PR PBB SHADOW E&M-EST. PATIENT-LVL III: CPT | Mod: PBBFAC,,, | Performed by: INTERNAL MEDICINE

## 2021-01-09 LAB — RPR SER QL: NORMAL

## 2021-01-11 LAB
HCV AB SERPL QL IA: NEGATIVE
HIV 1+2 AB+HIV1 P24 AG SERPL QL IA: NEGATIVE

## 2021-02-04 ENCOUNTER — PATIENT MESSAGE (OUTPATIENT)
Dept: INTERNAL MEDICINE | Facility: CLINIC | Age: 46
End: 2021-02-04

## 2021-02-10 ENCOUNTER — OFFICE VISIT (OUTPATIENT)
Dept: FAMILY MEDICINE | Facility: CLINIC | Age: 46
End: 2021-02-10
Payer: MEDICAID

## 2021-02-10 VITALS
HEIGHT: 77 IN | SYSTOLIC BLOOD PRESSURE: 130 MMHG | WEIGHT: 236.31 LBS | OXYGEN SATURATION: 95 % | DIASTOLIC BLOOD PRESSURE: 92 MMHG | HEART RATE: 88 BPM | TEMPERATURE: 97 F | BODY MASS INDEX: 27.9 KG/M2

## 2021-02-10 DIAGNOSIS — B36.9 FUNGAL SKIN INFECTION: Primary | ICD-10-CM

## 2021-02-10 PROCEDURE — 99999 PR PBB SHADOW E&M-EST. PATIENT-LVL IV: CPT | Mod: PBBFAC,,, | Performed by: STUDENT IN AN ORGANIZED HEALTH CARE EDUCATION/TRAINING PROGRAM

## 2021-02-10 PROCEDURE — 99214 OFFICE O/P EST MOD 30 MIN: CPT | Mod: PBBFAC,PO | Performed by: STUDENT IN AN ORGANIZED HEALTH CARE EDUCATION/TRAINING PROGRAM

## 2021-02-10 PROCEDURE — 99999 PR PBB SHADOW E&M-EST. PATIENT-LVL IV: ICD-10-PCS | Mod: PBBFAC,,, | Performed by: STUDENT IN AN ORGANIZED HEALTH CARE EDUCATION/TRAINING PROGRAM

## 2021-02-10 PROCEDURE — 99214 PR OFFICE/OUTPT VISIT, EST, LEVL IV, 30-39 MIN: ICD-10-PCS | Mod: S$PBB,,, | Performed by: STUDENT IN AN ORGANIZED HEALTH CARE EDUCATION/TRAINING PROGRAM

## 2021-02-10 PROCEDURE — 99214 OFFICE O/P EST MOD 30 MIN: CPT | Mod: S$PBB,,, | Performed by: STUDENT IN AN ORGANIZED HEALTH CARE EDUCATION/TRAINING PROGRAM

## 2021-02-10 RX ORDER — FLUCONAZOLE 150 MG/1
400 TABLET ORAL
COMMUNITY
Start: 2021-02-07 | End: 2021-02-10 | Stop reason: SDUPTHER

## 2021-02-10 RX ORDER — CLOTRIMAZOLE AND BETAMETHASONE DIPROPIONATE 10; .64 MG/G; MG/G
CREAM TOPICAL
COMMUNITY
Start: 2021-02-05 | End: 2021-03-09 | Stop reason: SDUPTHER

## 2021-02-10 RX ORDER — FLUCONAZOLE 150 MG/1
150 TABLET ORAL DAILY
Qty: 5 TABLET | Refills: 0 | Status: SHIPPED | OUTPATIENT
Start: 2021-02-10 | End: 2021-02-15

## 2021-02-12 PROBLEM — K52.9 IBD (INFLAMMATORY BOWEL DISEASE): Status: ACTIVE | Noted: 2021-02-12

## 2021-03-09 ENCOUNTER — PATIENT MESSAGE (OUTPATIENT)
Dept: FAMILY MEDICINE | Facility: CLINIC | Age: 46
End: 2021-03-09

## 2021-03-09 DIAGNOSIS — B36.9 FUNGAL SKIN INFECTION: ICD-10-CM

## 2021-03-09 RX ORDER — FLUCONAZOLE 150 MG/1
TABLET ORAL
Qty: 5 TABLET | Refills: 0 | OUTPATIENT
Start: 2021-03-09

## 2021-03-09 RX ORDER — CLOTRIMAZOLE AND BETAMETHASONE DIPROPIONATE 10; .64 MG/G; MG/G
CREAM TOPICAL
Qty: 45 G | Refills: 0 | Status: SHIPPED | OUTPATIENT
Start: 2021-03-09 | End: 2022-10-21

## 2021-03-17 ENCOUNTER — OFFICE VISIT (OUTPATIENT)
Dept: INTERNAL MEDICINE | Facility: CLINIC | Age: 46
End: 2021-03-17
Payer: MEDICAID

## 2021-03-17 VITALS
WEIGHT: 238.75 LBS | DIASTOLIC BLOOD PRESSURE: 70 MMHG | HEIGHT: 77 IN | BODY MASS INDEX: 28.19 KG/M2 | SYSTOLIC BLOOD PRESSURE: 120 MMHG | RESPIRATION RATE: 17 BRPM | HEART RATE: 81 BPM | OXYGEN SATURATION: 99 % | TEMPERATURE: 98 F

## 2021-03-17 DIAGNOSIS — B35.4 TINEA CORPORIS: Primary | ICD-10-CM

## 2021-03-17 PROCEDURE — 99214 OFFICE O/P EST MOD 30 MIN: CPT | Mod: PBBFAC,PO | Performed by: INTERNAL MEDICINE

## 2021-03-17 PROCEDURE — 99214 PR OFFICE/OUTPT VISIT, EST, LEVL IV, 30-39 MIN: ICD-10-PCS | Mod: S$PBB,,, | Performed by: INTERNAL MEDICINE

## 2021-03-17 PROCEDURE — 99999 PR PBB SHADOW E&M-EST. PATIENT-LVL IV: ICD-10-PCS | Mod: PBBFAC,,, | Performed by: INTERNAL MEDICINE

## 2021-03-17 PROCEDURE — 99214 OFFICE O/P EST MOD 30 MIN: CPT | Mod: S$PBB,,, | Performed by: INTERNAL MEDICINE

## 2021-03-17 PROCEDURE — 99999 PR PBB SHADOW E&M-EST. PATIENT-LVL IV: CPT | Mod: PBBFAC,,, | Performed by: INTERNAL MEDICINE

## 2021-03-17 RX ORDER — TERBINAFINE HYDROCHLORIDE 250 MG/1
250 TABLET ORAL DAILY
Qty: 14 TABLET | Refills: 0 | Status: SHIPPED | OUTPATIENT
Start: 2021-03-17 | End: 2021-03-31

## 2021-03-17 RX ORDER — FLUCONAZOLE 200 MG/1
100 TABLET ORAL WEEKLY
COMMUNITY
End: 2021-03-17

## 2021-03-24 ENCOUNTER — PATIENT MESSAGE (OUTPATIENT)
Dept: INTERNAL MEDICINE | Facility: CLINIC | Age: 46
End: 2021-03-24

## 2021-03-24 DIAGNOSIS — R21 RASH: Primary | ICD-10-CM

## 2021-03-25 ENCOUNTER — PATIENT OUTREACH (OUTPATIENT)
Dept: ADMINISTRATIVE | Facility: OTHER | Age: 46
End: 2021-03-25

## 2021-03-26 ENCOUNTER — OFFICE VISIT (OUTPATIENT)
Dept: DERMATOLOGY | Facility: CLINIC | Age: 46
End: 2021-03-26
Payer: MEDICAID

## 2021-03-26 DIAGNOSIS — R21 RASH: Primary | ICD-10-CM

## 2021-03-26 PROCEDURE — 88305 TISSUE EXAM BY PATHOLOGIST: CPT | Mod: 26,,, | Performed by: PATHOLOGY

## 2021-03-26 PROCEDURE — 11104 PR PUNCH BIOPSY, SKIN, SINGLE LESION: ICD-10-PCS | Mod: S$PBB,,, | Performed by: DERMATOLOGY

## 2021-03-26 PROCEDURE — 11104 PUNCH BX SKIN SINGLE LESION: CPT | Mod: PBBFAC | Performed by: DERMATOLOGY

## 2021-03-26 PROCEDURE — 87101 SKIN FUNGI CULTURE: CPT | Performed by: DERMATOLOGY

## 2021-03-26 PROCEDURE — 88312 SPECIAL STAINS GROUP 1: CPT | Mod: 26,,, | Performed by: PATHOLOGY

## 2021-03-26 PROCEDURE — 99999 PR PBB SHADOW E&M-EST. PATIENT-LVL III: ICD-10-PCS | Mod: PBBFAC,,, | Performed by: DERMATOLOGY

## 2021-03-26 PROCEDURE — 99499 NO LOS: ICD-10-PCS | Mod: S$PBB,,, | Performed by: DERMATOLOGY

## 2021-03-26 PROCEDURE — 11104 PUNCH BX SKIN SINGLE LESION: CPT | Mod: S$PBB,,, | Performed by: DERMATOLOGY

## 2021-03-26 PROCEDURE — 88312 SPECIAL STAINS GROUP 1: CPT | Performed by: PATHOLOGY

## 2021-03-26 PROCEDURE — 88305 TISSUE EXAM BY PATHOLOGIST: CPT | Performed by: PATHOLOGY

## 2021-03-26 PROCEDURE — 99499 UNLISTED E&M SERVICE: CPT | Mod: S$PBB,,, | Performed by: DERMATOLOGY

## 2021-03-26 PROCEDURE — 88305 TISSUE EXAM BY PATHOLOGIST: ICD-10-PCS | Mod: 26,,, | Performed by: PATHOLOGY

## 2021-03-26 PROCEDURE — 99999 PR PBB SHADOW E&M-EST. PATIENT-LVL III: CPT | Mod: PBBFAC,,, | Performed by: DERMATOLOGY

## 2021-03-26 PROCEDURE — 88312 PR  SPECIAL STAINS,GROUP I: ICD-10-PCS | Mod: 26,,, | Performed by: PATHOLOGY

## 2021-03-26 PROCEDURE — 99213 OFFICE O/P EST LOW 20 MIN: CPT | Mod: PBBFAC | Performed by: DERMATOLOGY

## 2021-03-31 DIAGNOSIS — L30.8 PSORIASIFORM DERMATITIS: Primary | ICD-10-CM

## 2021-03-31 LAB
FINAL PATHOLOGIC DIAGNOSIS: NORMAL
GROSS: NORMAL
MICROSCOPIC EXAM: NORMAL

## 2021-03-31 RX ORDER — TRIAMCINOLONE ACETONIDE 1 MG/G
CREAM TOPICAL 2 TIMES DAILY PRN
Qty: 80 G | Refills: 3 | Status: ON HOLD | OUTPATIENT
Start: 2021-03-31 | End: 2021-04-28 | Stop reason: HOSPADM

## 2021-04-16 ENCOUNTER — PATIENT MESSAGE (OUTPATIENT)
Dept: RESEARCH | Facility: HOSPITAL | Age: 46
End: 2021-04-16

## 2021-04-26 LAB — FUNGUS BLD CULT: NORMAL

## 2021-04-27 PROBLEM — E86.0 DEHYDRATION: Status: ACTIVE | Noted: 2021-04-27

## 2021-04-27 PROBLEM — N18.9 ACUTE-ON-CHRONIC KIDNEY INJURY: Status: ACTIVE | Noted: 2021-04-27

## 2021-04-27 PROBLEM — F11.93 ACUTE OPIOID WITHDRAWAL: Status: ACTIVE | Noted: 2021-04-27

## 2021-04-27 PROBLEM — N17.9 ACUTE-ON-CHRONIC KIDNEY INJURY: Status: ACTIVE | Noted: 2021-04-27

## 2021-04-28 PROBLEM — E86.0 DEHYDRATION: Status: RESOLVED | Noted: 2021-04-27 | Resolved: 2021-04-28

## 2021-04-28 PROBLEM — N17.9 ACUTE-ON-CHRONIC KIDNEY INJURY: Status: RESOLVED | Noted: 2021-04-27 | Resolved: 2021-04-28

## 2021-04-28 PROBLEM — N18.9 ACUTE-ON-CHRONIC KIDNEY INJURY: Status: RESOLVED | Noted: 2021-04-27 | Resolved: 2021-04-28

## 2021-05-03 ENCOUNTER — IMMUNIZATION (OUTPATIENT)
Dept: PRIMARY CARE CLINIC | Facility: CLINIC | Age: 46
End: 2021-05-03
Payer: MEDICAID

## 2021-05-03 DIAGNOSIS — Z23 NEED FOR VACCINATION: Primary | ICD-10-CM

## 2021-05-03 PROCEDURE — 0031A COVID-19,VECTOR-NR,RS-AD26,PF,0.5 ML DOSE VACCINE (JANSSEN): CPT | Mod: PBBFAC,PN

## 2021-05-14 ENCOUNTER — PATIENT MESSAGE (OUTPATIENT)
Dept: DERMATOLOGY | Facility: CLINIC | Age: 46
End: 2021-05-14

## 2021-05-19 ENCOUNTER — PATIENT MESSAGE (OUTPATIENT)
Dept: DERMATOLOGY | Facility: CLINIC | Age: 46
End: 2021-05-19

## 2021-05-20 ENCOUNTER — PATIENT OUTREACH (OUTPATIENT)
Dept: ADMINISTRATIVE | Facility: OTHER | Age: 46
End: 2021-05-20

## 2021-07-29 ENCOUNTER — OFFICE VISIT (OUTPATIENT)
Dept: SURGERY | Facility: CLINIC | Age: 46
End: 2021-07-29
Payer: MEDICAID

## 2021-07-29 VITALS
RESPIRATION RATE: 18 BRPM | HEART RATE: 83 BPM | OXYGEN SATURATION: 97 % | SYSTOLIC BLOOD PRESSURE: 129 MMHG | WEIGHT: 238.19 LBS | BODY MASS INDEX: 29 KG/M2 | HEIGHT: 76 IN | DIASTOLIC BLOOD PRESSURE: 83 MMHG

## 2021-07-29 DIAGNOSIS — K42.9 UMBILICAL HERNIA WITHOUT OBSTRUCTION AND WITHOUT GANGRENE: Primary | ICD-10-CM

## 2021-07-29 PROCEDURE — 99999 PR PBB SHADOW E&M-EST. PATIENT-LVL IV: ICD-10-PCS | Mod: PBBFAC,,, | Performed by: SURGERY

## 2021-07-29 PROCEDURE — 99214 PR OFFICE/OUTPT VISIT, EST, LEVL IV, 30-39 MIN: ICD-10-PCS | Mod: S$PBB,,, | Performed by: SURGERY

## 2021-07-29 PROCEDURE — 99214 OFFICE O/P EST MOD 30 MIN: CPT | Mod: PBBFAC | Performed by: SURGERY

## 2021-07-29 PROCEDURE — 99999 PR PBB SHADOW E&M-EST. PATIENT-LVL IV: CPT | Mod: PBBFAC,,, | Performed by: SURGERY

## 2021-07-29 PROCEDURE — 99214 OFFICE O/P EST MOD 30 MIN: CPT | Mod: S$PBB,,, | Performed by: SURGERY

## 2021-10-29 ENCOUNTER — TELEPHONE (OUTPATIENT)
Dept: INTERNAL MEDICINE | Facility: CLINIC | Age: 46
End: 2021-10-29
Payer: MEDICAID

## 2021-10-29 ENCOUNTER — OFFICE VISIT (OUTPATIENT)
Dept: INTERNAL MEDICINE | Facility: CLINIC | Age: 46
End: 2021-10-29
Payer: MEDICAID

## 2021-10-29 VITALS
HEIGHT: 77 IN | DIASTOLIC BLOOD PRESSURE: 90 MMHG | BODY MASS INDEX: 28.4 KG/M2 | OXYGEN SATURATION: 97 % | SYSTOLIC BLOOD PRESSURE: 124 MMHG | TEMPERATURE: 98 F | WEIGHT: 240.5 LBS | HEART RATE: 87 BPM

## 2021-10-29 DIAGNOSIS — J11.1 INFLUENZA-LIKE ILLNESS: Primary | ICD-10-CM

## 2021-10-29 DIAGNOSIS — R05.9 COUGH: ICD-10-CM

## 2021-10-29 LAB
INFLUENZA A, MOLECULAR: NEGATIVE
INFLUENZA B, MOLECULAR: NEGATIVE
SPECIMEN SOURCE: NORMAL

## 2021-10-29 PROCEDURE — 87502 INFLUENZA DNA AMP PROBE: CPT | Mod: PO | Performed by: INTERNAL MEDICINE

## 2021-10-29 PROCEDURE — U0005 INFEC AGEN DETEC AMPLI PROBE: HCPCS | Performed by: INTERNAL MEDICINE

## 2021-10-29 PROCEDURE — 99213 OFFICE O/P EST LOW 20 MIN: CPT | Mod: PBBFAC,PO | Performed by: INTERNAL MEDICINE

## 2021-10-29 PROCEDURE — 99213 OFFICE O/P EST LOW 20 MIN: CPT | Mod: S$PBB,,, | Performed by: INTERNAL MEDICINE

## 2021-10-29 PROCEDURE — 99213 PR OFFICE/OUTPT VISIT, EST, LEVL III, 20-29 MIN: ICD-10-PCS | Mod: S$PBB,,, | Performed by: INTERNAL MEDICINE

## 2021-10-29 PROCEDURE — U0003 INFECTIOUS AGENT DETECTION BY NUCLEIC ACID (DNA OR RNA); SEVERE ACUTE RESPIRATORY SYNDROME CORONAVIRUS 2 (SARS-COV-2) (CORONAVIRUS DISEASE [COVID-19]), AMPLIFIED PROBE TECHNIQUE, MAKING USE OF HIGH THROUGHPUT TECHNOLOGIES AS DESCRIBED BY CMS-2020-01-R: HCPCS | Performed by: INTERNAL MEDICINE

## 2021-10-29 PROCEDURE — 99999 PR PBB SHADOW E&M-EST. PATIENT-LVL III: CPT | Mod: PBBFAC,,, | Performed by: INTERNAL MEDICINE

## 2021-10-29 PROCEDURE — 99999 PR PBB SHADOW E&M-EST. PATIENT-LVL III: ICD-10-PCS | Mod: PBBFAC,,, | Performed by: INTERNAL MEDICINE

## 2021-10-30 LAB
SARS-COV-2 RNA RESP QL NAA+PROBE: NOT DETECTED
SARS-COV-2- CYCLE NUMBER: NORMAL

## 2021-11-15 ENCOUNTER — PATIENT MESSAGE (OUTPATIENT)
Dept: INTERNAL MEDICINE | Facility: CLINIC | Age: 46
End: 2021-11-15
Payer: MEDICAID

## 2021-11-17 ENCOUNTER — OFFICE VISIT (OUTPATIENT)
Dept: FAMILY MEDICINE | Facility: CLINIC | Age: 46
End: 2021-11-17
Payer: MEDICAID

## 2021-11-17 VITALS
WEIGHT: 235.88 LBS | DIASTOLIC BLOOD PRESSURE: 80 MMHG | SYSTOLIC BLOOD PRESSURE: 120 MMHG | BODY MASS INDEX: 27.85 KG/M2 | HEIGHT: 77 IN | OXYGEN SATURATION: 100 % | HEART RATE: 90 BPM

## 2021-11-17 DIAGNOSIS — B36.9 FUNGAL SKIN INFECTION: Primary | ICD-10-CM

## 2021-11-17 DIAGNOSIS — Z23 NEED FOR INFLUENZA VACCINATION: ICD-10-CM

## 2021-11-17 PROCEDURE — 99214 OFFICE O/P EST MOD 30 MIN: CPT | Mod: PBBFAC,PO | Performed by: NURSE PRACTITIONER

## 2021-11-17 PROCEDURE — 90471 IMMUNIZATION ADMIN: CPT | Mod: PBBFAC,PO

## 2021-11-17 PROCEDURE — 99214 OFFICE O/P EST MOD 30 MIN: CPT | Mod: S$PBB,,, | Performed by: NURSE PRACTITIONER

## 2021-11-17 PROCEDURE — 99214 PR OFFICE/OUTPT VISIT, EST, LEVL IV, 30-39 MIN: ICD-10-PCS | Mod: S$PBB,,, | Performed by: NURSE PRACTITIONER

## 2021-11-17 PROCEDURE — 99999 PR PBB SHADOW E&M-EST. PATIENT-LVL IV: CPT | Mod: PBBFAC,,, | Performed by: NURSE PRACTITIONER

## 2021-11-17 PROCEDURE — 99999 PR PBB SHADOW E&M-EST. PATIENT-LVL IV: ICD-10-PCS | Mod: PBBFAC,,, | Performed by: NURSE PRACTITIONER

## 2021-11-17 RX ORDER — FLUCONAZOLE 200 MG/1
200 TABLET ORAL WEEKLY
Qty: 3 TABLET | Refills: 0 | Status: SHIPPED | OUTPATIENT
Start: 2021-11-17 | End: 2021-12-02

## 2021-11-18 ENCOUNTER — TELEPHONE (OUTPATIENT)
Dept: DERMATOLOGY | Facility: CLINIC | Age: 46
End: 2021-11-18
Payer: MEDICAID

## 2021-11-29 ENCOUNTER — TELEPHONE (OUTPATIENT)
Dept: DERMATOLOGY | Facility: CLINIC | Age: 46
End: 2021-11-29
Payer: MEDICAID

## 2021-12-06 ENCOUNTER — PATIENT MESSAGE (OUTPATIENT)
Dept: INTERNAL MEDICINE | Facility: CLINIC | Age: 46
End: 2021-12-06
Payer: MEDICAID

## 2021-12-06 ENCOUNTER — OFFICE VISIT (OUTPATIENT)
Dept: FAMILY MEDICINE | Facility: CLINIC | Age: 46
End: 2021-12-06
Payer: MEDICAID

## 2021-12-06 VITALS
OXYGEN SATURATION: 98 % | WEIGHT: 240.31 LBS | BODY MASS INDEX: 28.37 KG/M2 | SYSTOLIC BLOOD PRESSURE: 110 MMHG | HEIGHT: 77 IN | DIASTOLIC BLOOD PRESSURE: 90 MMHG | HEART RATE: 103 BPM

## 2021-12-06 DIAGNOSIS — J01.90 ACUTE BACTERIAL SINUSITIS: Primary | ICD-10-CM

## 2021-12-06 DIAGNOSIS — B96.89 ACUTE BACTERIAL SINUSITIS: Primary | ICD-10-CM

## 2021-12-06 PROCEDURE — 99213 PR OFFICE/OUTPT VISIT, EST, LEVL III, 20-29 MIN: ICD-10-PCS | Mod: S$PBB,,, | Performed by: FAMILY MEDICINE

## 2021-12-06 PROCEDURE — 99213 OFFICE O/P EST LOW 20 MIN: CPT | Mod: PBBFAC,PO | Performed by: FAMILY MEDICINE

## 2021-12-06 PROCEDURE — 99999 PR PBB SHADOW E&M-EST. PATIENT-LVL III: ICD-10-PCS | Mod: PBBFAC,,, | Performed by: FAMILY MEDICINE

## 2021-12-06 PROCEDURE — 99213 OFFICE O/P EST LOW 20 MIN: CPT | Mod: S$PBB,,, | Performed by: FAMILY MEDICINE

## 2021-12-06 PROCEDURE — 99999 PR PBB SHADOW E&M-EST. PATIENT-LVL III: CPT | Mod: PBBFAC,,, | Performed by: FAMILY MEDICINE

## 2021-12-06 RX ORDER — FLUTICASONE PROPIONATE 50 MCG
1 SPRAY, SUSPENSION (ML) NASAL DAILY
Qty: 15.8 ML | Refills: 0 | Status: SHIPPED | OUTPATIENT
Start: 2021-12-06 | End: 2022-10-21

## 2021-12-16 ENCOUNTER — IMMUNIZATION (OUTPATIENT)
Dept: INTERNAL MEDICINE | Facility: CLINIC | Age: 46
End: 2021-12-16
Payer: MEDICAID

## 2021-12-16 DIAGNOSIS — Z23 NEED FOR VACCINATION: Primary | ICD-10-CM

## 2021-12-16 PROCEDURE — 0003A COVID-19, MRNA, LNP-S, PF, 30 MCG/0.3 ML DOSE VACCINE: CPT | Mod: PBBFAC,PO

## 2021-12-16 PROCEDURE — 91300 COVID-19, MRNA, LNP-S, PF, 30 MCG/0.3 ML DOSE VACCINE: CPT | Mod: PBBFAC,PO

## 2022-01-27 ENCOUNTER — TELEPHONE (OUTPATIENT)
Dept: FAMILY MEDICINE | Facility: CLINIC | Age: 47
End: 2022-01-27
Payer: MEDICAID

## 2022-01-27 NOTE — TELEPHONE ENCOUNTER
I spoke to the pt and he stated he is felling better and did not need and appointment. Pt also stated he took a home COVID test which was negative. Pt has voiced understanding.

## 2022-05-12 ENCOUNTER — PATIENT MESSAGE (OUTPATIENT)
Dept: FAMILY MEDICINE | Facility: CLINIC | Age: 47
End: 2022-05-12

## 2022-05-12 ENCOUNTER — TELEPHONE (OUTPATIENT)
Dept: FAMILY MEDICINE | Facility: CLINIC | Age: 47
End: 2022-05-12
Payer: MEDICAID

## 2022-05-12 NOTE — TELEPHONE ENCOUNTER
Attempted to contact pt to inform him of his same day appt. VM was full, unable to leave message regarding appt.

## 2022-07-19 ENCOUNTER — PATIENT MESSAGE (OUTPATIENT)
Dept: INTERNAL MEDICINE | Facility: CLINIC | Age: 47
End: 2022-07-19
Payer: MEDICAID

## 2022-07-26 ENCOUNTER — TELEPHONE (OUTPATIENT)
Dept: ALLERGY | Facility: CLINIC | Age: 47
End: 2022-07-26
Payer: MEDICAID

## 2022-07-26 NOTE — TELEPHONE ENCOUNTER
----- Message from Siobhan Dillon sent at 7/18/2022  2:01 PM CDT -----  Contact: [phone]  Type:  Sooner Apoointment Request    Caller is requesting a sooner appointment.  Caller declined first available appointment listed below.  Caller will not accept being placed on the waitlist and is requesting a message be sent to doctor.  Name of Caller:pt called   When is the first available appointment?08/02/2022  Symptoms:Rash  Would the patient rather a call back or a response via [facility]? Call back   Best Call Back Number:[phone]  Additional Information:

## 2022-08-03 ENCOUNTER — TELEPHONE (OUTPATIENT)
Dept: DERMATOLOGY | Facility: CLINIC | Age: 47
End: 2022-08-03
Payer: MEDICAID

## 2022-08-03 NOTE — TELEPHONE ENCOUNTER
----- Message from Avril Clark MA sent at 8/2/2022  3:50 PM CDT -----  Regarding: FW: appt  Contact: pt @ [phone]    ----- Message -----  From: Cleopatra Curry  Sent: 8/2/2022   3:38 PM CDT  To: [facility]AL[facility] Staff  Subject: appt                                             Pt calling to schedule an follow up appt with Dr. Kimbrough, no available appts in epic. Please call.

## 2022-10-10 ENCOUNTER — TELEPHONE (OUTPATIENT)
Dept: INTERNAL MEDICINE | Facility: CLINIC | Age: 47
End: 2022-10-10
Payer: MEDICAID

## 2022-10-10 NOTE — TELEPHONE ENCOUNTER
----- Message from Santos Moran sent at 10/10/2022 11:25 AM CDT -----  Pt would like to be called back asap regarding questions concerning his current condition(sinus issues).    Pt can be reached at [phone].    Thanks

## 2022-10-21 ENCOUNTER — OFFICE VISIT (OUTPATIENT)
Dept: GASTROENTEROLOGY | Facility: CLINIC | Age: 47
End: 2022-10-21
Payer: MEDICAID

## 2022-10-21 VITALS
WEIGHT: 233.19 LBS | BODY MASS INDEX: 27.53 KG/M2 | HEIGHT: 77 IN | SYSTOLIC BLOOD PRESSURE: 139 MMHG | DIASTOLIC BLOOD PRESSURE: 83 MMHG | HEART RATE: 91 BPM

## 2022-10-21 DIAGNOSIS — R13.19 ESOPHAGEAL DYSPHAGIA: Primary | ICD-10-CM

## 2022-10-21 PROCEDURE — 99214 OFFICE O/P EST MOD 30 MIN: CPT | Mod: PBBFAC,PO | Performed by: NURSE PRACTITIONER

## 2022-10-21 PROCEDURE — 1159F PR MEDICATION LIST DOCUMENTED IN MEDICAL RECORD: ICD-10-PCS | Mod: CPTII,,, | Performed by: NURSE PRACTITIONER

## 2022-10-21 PROCEDURE — 99203 PR OFFICE/OUTPT VISIT, NEW, LEVL III, 30-44 MIN: ICD-10-PCS | Mod: S$PBB,,, | Performed by: NURSE PRACTITIONER

## 2022-10-21 PROCEDURE — 99999 PR PBB SHADOW E&M-EST. PATIENT-LVL IV: ICD-10-PCS | Mod: PBBFAC,,, | Performed by: NURSE PRACTITIONER

## 2022-10-21 PROCEDURE — 1159F MED LIST DOCD IN RCRD: CPT | Mod: CPTII,,, | Performed by: NURSE PRACTITIONER

## 2022-10-21 PROCEDURE — 99999 PR PBB SHADOW E&M-EST. PATIENT-LVL IV: CPT | Mod: PBBFAC,,, | Performed by: NURSE PRACTITIONER

## 2022-10-21 PROCEDURE — 1160F PR REVIEW ALL MEDS BY PRESCRIBER/CLIN PHARMACIST DOCUMENTED: ICD-10-PCS | Mod: CPTII,,, | Performed by: NURSE PRACTITIONER

## 2022-10-21 PROCEDURE — 3079F PR MOST RECENT DIASTOLIC BLOOD PRESSURE 80-89 MM HG: ICD-10-PCS | Mod: CPTII,,, | Performed by: NURSE PRACTITIONER

## 2022-10-21 PROCEDURE — 3075F SYST BP GE 130 - 139MM HG: CPT | Mod: CPTII,,, | Performed by: NURSE PRACTITIONER

## 2022-10-21 PROCEDURE — 3075F PR MOST RECENT SYSTOLIC BLOOD PRESS GE 130-139MM HG: ICD-10-PCS | Mod: CPTII,,, | Performed by: NURSE PRACTITIONER

## 2022-10-21 PROCEDURE — 1160F RVW MEDS BY RX/DR IN RCRD: CPT | Mod: CPTII,,, | Performed by: NURSE PRACTITIONER

## 2022-10-21 PROCEDURE — 3079F DIAST BP 80-89 MM HG: CPT | Mod: CPTII,,, | Performed by: NURSE PRACTITIONER

## 2022-10-21 PROCEDURE — 99203 OFFICE O/P NEW LOW 30 MIN: CPT | Mod: S$PBB,,, | Performed by: NURSE PRACTITIONER

## 2022-10-21 RX ORDER — HYDROCODONE BITARTRATE AND ACETAMINOPHEN 5; 325 MG/1; MG/1
TABLET ORAL
COMMUNITY
End: 2022-10-21

## 2022-10-21 RX ORDER — OMEPRAZOLE 40 MG/1
40 CAPSULE, DELAYED RELEASE ORAL DAILY
Qty: 30 CAPSULE | Refills: 2 | Status: SHIPPED | OUTPATIENT
Start: 2022-10-21 | End: 2022-12-21 | Stop reason: SDUPTHER

## 2022-10-21 RX ORDER — FLUCONAZOLE 200 MG/1
TABLET ORAL
COMMUNITY
End: 2022-10-21

## 2022-10-21 RX ORDER — CYCLOBENZAPRINE HCL 5 MG
TABLET ORAL
COMMUNITY
End: 2022-10-21

## 2022-10-21 NOTE — PATIENT INSTRUCTIONS
EGD Prep Instructions    [facility] [address] [place]  [address]    You are scheduled for an EGD with Dr. Flores on 11/11/2022 at [facility].  You will enter through the [facility] entrance and check in at Same Day Surgery.    Nothing to eat or drink after midnight before the procedure.  You MAY brush your teeth.    You MAY take your blood pressure, heart, and seizure medication on the morning of the procedure, with a SIP of water.  Hold ALL other medications until after the procedure.    You must have someone with you to DRIVE YOU HOME since you will be receiving IV sedation for the procedure.    If you are on blood thinners THAT YOU HAVE BEEN INSTRUCTED TO HOLD BY YOUR DOCTOR FOR THIS PROCEDURE, then do NOT take this the morning of your EGD.  Do NOT stop these medications on your own, they must be approved to be held by your doctor.  Your EGD can NOT be done if you are on these medications.  Examples of blood thinners include: Coumadin, Aggrenox, Plavix, Pradaxa, Reapro, Pletal, Xarelto, Ticagrelor, Brilinta, Eliquis, and high dose aspirin (325 mg).  You do not have to stop baby aspirin 81 mg.    You will receive a call a few days before your EGD to tell you the time to arrive.  If you have not received a call by the day before your procedure, call the Pre-op Coordinator at [phone].

## 2022-10-21 NOTE — PROGRESS NOTES
Subjective:       Patient ID: Farzad Moore is a 47 y.o. male.    Chief Complaint: Dysphagia    46 y/o male with history of  food impaction and suspected EoE on EGD in 2017 presents to clinic with c/o dysphagia. States symptoms have progressively worsened over the past few weeks. Patient reports food especially meat and bread getting stuck in the throat while pointing at the suprasternal notch. Repeatedly swallows to resolve globus sensation. Denies choking, regurgitation, nausea, vomiting, or abdominal pain. Denies smoking or alcohol use.       Past Medical History:   Diagnosis Date    ADHD     Right inguinal hernia 12/14/2018       Past Surgical History:   Procedure Laterality Date    HERNIA REPAIR      KNEE SURGERY         Family History   Problem Relation Age of Onset    Diabetes Mother     Heart attack Father 38    Lung cancer Father     Heart attack Paternal Uncle 50    Colon cancer Neg Hx     Crohn's disease Neg Hx     Ulcerative colitis Neg Hx     Prostate cancer Neg Hx        Social History     Socioeconomic History    Marital status:     Number of children: 1   Occupational History    Occupation: printing business   Tobacco Use    Smoking status: Never    Smokeless tobacco: Never   Substance and Sexual Activity    Alcohol use: No    Drug use: Yes    Sexual activity: Yes     Partners: Female   Social History Narrative    Patient is originally from [place] [facility]         School at ; 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 Jonny Toledo IV, MD    Address: [facility] [place] [address]    Phone: [phone]    Psychiatry                Review of Systems   Constitutional:  Negative for appetite change and unexpected weight change.   HENT:  Positive for  "trouble swallowing.    Respiratory:  Negative for cough and shortness of breath.    Cardiovascular:  Negative for chest pain.   Gastrointestinal:  Negative for abdominal pain and blood in stool.   Hematological:  Negative for adenopathy. Does not bruise/bleed easily.   Psychiatric/Behavioral:  Negative for dysphoric mood.        Objective:     Vitals:    10/21/22 0900   BP: 139/83   BP Location: Right arm   Patient Position: Sitting   BP Method: Large (Automatic)   Pulse: 91   Weight: 105.8 kg (233 lb 3.2 oz)   Height: 6' 5" (1.956 m)          Physical Exam  Constitutional:       General: He is not in acute distress.     Appearance: Normal appearance. He is not ill-appearing.   HENT:      Head: Normocephalic.   Eyes:      Conjunctiva/sclera: Conjunctivae normal.      Pupils: Pupils are equal, round, and reactive to light.   Pulmonary:      Effort: Pulmonary effort is normal. No respiratory distress.   Musculoskeletal:         General: Normal range of motion.      Cervical back: Normal range of motion.   Skin:     General: Skin is warm and dry.   Neurological:      Mental Status: He is alert and oriented to person, place, and time.   Psychiatric:         Mood and Affect: Mood normal.         Behavior: Behavior normal.             Assessment:         ICD-10-CM ICD-9-CM   1. Esophageal dysphagia  R13.19 787.29       Plan:       Esophageal dysphagia  -     Case Request Endoscopy: EGD (ESOPHAGOGASTRODUODENOSCOPY)  -     omeprazole (PRILOSEC) 40 MG capsule; Take 1 capsule (40 mg total) by mouth once daily.  Dispense: 30 capsule; Refill: 2      Follow up if symptoms worsen or fail to improve.     Patient's Medications   New Prescriptions    OMEPRAZOLE (PRILOSEC) 40 MG CAPSULE    Take 1 capsule (40 mg total) by mouth once daily.   Previous Medications    CLONAZEPAM (KLONOPIN) 1 MG TABLET    Take 1 mg by mouth every evening.    DEXTROAMPHETAMINE-AMPHETAMINE (ADDERALL XR) 30 MG 24 HR CAPSULE    Take 30 mg by mouth every " morning.   Modified Medications    No medications on file   Discontinued Medications    CLOTRIMAZOLE-BETAMETHASONE 1-0.05% (LOTRISONE) CREAM    APPLY EXTERNALLY TO THE AFFECTED AREA TWICE DAILY FOR 7 DAYS    CYCLOBENZAPRINE (FLEXERIL) 5 MG TABLET    cyclobenzaprine 5 mg tablet   TAKE 1 TABLET BY MOUTH BEFORE BEDTIME    FLUCONAZOLE (DIFLUCAN) 200 MG TAB    fluconazole 200 mg tablet    FLUTICASONE PROPIONATE (FLONASE) 50 MCG/ACTUATION NASAL SPRAY    1 spray (50 mcg total) by Each Nostril route once daily.    HYDROCODONE-ACETAMINOPHEN (NORCO) 5-325 MG PER TABLET    hydrocodone 5 mg-acetaminophen 325 mg tablet   TAKE 1 TABLET BY MOUTH EVERY 4 TO 6 HOURS FOR PAIN    ONDANSETRON (ZOFRAN-ODT) 4 MG TBDL    Take 1 tablet (4 mg total) by mouth every 6 (six) hours as needed (nausea).

## 2022-11-10 ENCOUNTER — TELEPHONE (OUTPATIENT)
Dept: GASTROENTEROLOGY | Facility: CLINIC | Age: 47
End: 2022-11-10
Payer: MEDICAID

## 2022-11-10 NOTE — TELEPHONE ENCOUNTER
----- Message from Karol Avitia sent at 11/10/2022  1:34 PM CST -----  Type:  Needs Medical Advice    Who Called: pt     Would the patient rather a call back or a response via [facility]? Call   Best Call Back Number:   Additional Information: Pt is scheduled for tomorrow and does not have time for arrival.

## 2022-11-11 ENCOUNTER — NURSE TRIAGE (OUTPATIENT)
Dept: ADMINISTRATIVE | Facility: CLINIC | Age: 47
End: 2022-11-11
Payer: MEDICAID

## 2022-11-12 NOTE — TELEPHONE ENCOUNTER
Wife is calling stating patient had a procedure today. The dr told them he needs to start sucralfate as soon as possible and begin taking it but it is not at the pharmacy. Reviewed discharge medications and med not showing. Called [facility], told no one is on call for GI. Wife says dr told her someone would be on call all weekend. Spoke to House Supervisor at [facility], Thuan. He also states no one is on call. He says he will review chart and contact Dr. Escobar if necessary to resolve issue. He will contact caller directly with resolution. Please contact caller directly to discuss any further care advice.  Reason for Disposition   [1] Prescription not at pharmacy AND [2] was prescribed by PCP recently (Exception: triager has access to EMR and prescription is recorded there. Go to Home Care and confirm for pharmacy.)    Protocols used: Medication Question Call-A-

## 2022-11-12 NOTE — TELEPHONE ENCOUNTER
Patient was seen at [facility] for surgery. She says the pharmacy does not have his sucralfate.

## 2022-11-14 NOTE — TELEPHONE ENCOUNTER
Contacted by house supervisor Friday night.  I forgot to send sucralfate, she called in for me, 14 day supply.

## 2022-11-22 ENCOUNTER — PATIENT MESSAGE (OUTPATIENT)
Dept: GASTROENTEROLOGY | Facility: CLINIC | Age: 47
End: 2022-11-22
Payer: MEDICAID

## 2022-12-13 ENCOUNTER — PATIENT MESSAGE (OUTPATIENT)
Dept: GASTROENTEROLOGY | Facility: CLINIC | Age: 47
End: 2022-12-13
Payer: MEDICAID

## 2022-12-19 ENCOUNTER — PATIENT MESSAGE (OUTPATIENT)
Dept: INTERNAL MEDICINE | Facility: CLINIC | Age: 47
End: 2022-12-19
Payer: MEDICAID

## 2022-12-19 DIAGNOSIS — J34.9 SINUS PROBLEM: Primary | ICD-10-CM

## 2022-12-20 NOTE — TELEPHONE ENCOUNTER
[facility]  [address] [place] [address]  [phone]

## 2022-12-21 ENCOUNTER — OFFICE VISIT (OUTPATIENT)
Dept: GASTROENTEROLOGY | Facility: CLINIC | Age: 47
End: 2022-12-21
Payer: MEDICAID

## 2022-12-21 DIAGNOSIS — K21.9 GASTROESOPHAGEAL REFLUX DISEASE, UNSPECIFIED WHETHER ESOPHAGITIS PRESENT: Primary | ICD-10-CM

## 2022-12-21 DIAGNOSIS — R13.19 ESOPHAGEAL DYSPHAGIA: ICD-10-CM

## 2022-12-21 PROCEDURE — 1160F PR REVIEW ALL MEDS BY PRESCRIBER/CLIN PHARMACIST DOCUMENTED: ICD-10-PCS | Mod: CPTII,95,, | Performed by: NURSE PRACTITIONER

## 2022-12-21 PROCEDURE — 99214 OFFICE O/P EST MOD 30 MIN: CPT | Mod: 95,,, | Performed by: NURSE PRACTITIONER

## 2022-12-21 PROCEDURE — 99214 PR OFFICE/OUTPT VISIT, EST, LEVL IV, 30-39 MIN: ICD-10-PCS | Mod: 95,,, | Performed by: NURSE PRACTITIONER

## 2022-12-21 PROCEDURE — 1159F MED LIST DOCD IN RCRD: CPT | Mod: CPTII,95,, | Performed by: NURSE PRACTITIONER

## 2022-12-21 PROCEDURE — 1160F RVW MEDS BY RX/DR IN RCRD: CPT | Mod: CPTII,95,, | Performed by: NURSE PRACTITIONER

## 2022-12-21 PROCEDURE — 1159F PR MEDICATION LIST DOCUMENTED IN MEDICAL RECORD: ICD-10-PCS | Mod: CPTII,95,, | Performed by: NURSE PRACTITIONER

## 2022-12-21 RX ORDER — OMEPRAZOLE 40 MG/1
40 CAPSULE, DELAYED RELEASE ORAL DAILY
Qty: 30 CAPSULE | Refills: 5 | Status: SHIPPED | OUTPATIENT
Start: 2022-12-21 | End: 2024-01-10

## 2022-12-21 NOTE — PROGRESS NOTES
Subjective:       Patient ID: Farzad Moore is a 47 y.o. male.    Chief Complaint: Follow-up    The patient location is: [place], [place]  The chief complaint leading to consultation is: GERD    Visit type: audiovisual    Face to Face time with patient: 15 minutes  30 minutes of total time spent on the encounter, which includes face to face time and non-face to face time preparing to see the patient (eg, review of tests), Obtaining and/or reviewing separately obtained history, Documenting clinical information in the electronic or other health record, Independently interpreting results (not separately reported) and communicating results to the patient/family/caregiver, or Care coordination (not separately reported).         Each patient to whom he or she provides medical services by telemedicine is:  (1) informed of the relationship between the physician and patient and the respective role of any other health care provider with respect to management of the patient; and (2) notified that he or she may decline to receive medical services by telemedicine and may withdraw from such care at any time.    Notes:     46 y/o male presents for virtual follow up with c/o GERD. S/p EGD 11/2022 that showed esophageal mucosal changes suggestive of eosinophilic esophagitis; Biopsied; Benign-appearing esophageal stenosis; Dilated;  Gastritis; Biopsied; Normal examined duodenum; (+) HP. Completed HP antibiotic therapy. Symptoms returned after stopping omeprazole for 2 weeks. Has heartburn and reflux after certain foods. No dysphagia, nausea, vomiting, or abdominal pain.        Past Medical History:   Diagnosis Date    ADHD     Right inguinal hernia 12/14/2018       Past Surgical History:   Procedure Laterality Date    ESOPHAGOGASTRODUODENOSCOPY N/A 11/11/2022    Procedure: EGD (ESOPHAGOGASTRODUODENOSCOPY);  Surgeon: Baylee Flores MD;  Location: [facility];  Service: Endoscopy;  Laterality: N/A;    HERNIA REPAIR      KNEE SURGERY          Family History   Problem Relation Age of Onset    Diabetes Mother     Heart attack Father 38    Lung cancer Father     Heart attack Paternal Uncle 50    Colon cancer Neg Hx     Crohn's disease Neg Hx     Ulcerative colitis Neg Hx     Prostate cancer Neg Hx        Social History     Socioeconomic History    Marital status:     Number of children: 1   Occupational History    Occupation: printing business   Tobacco Use    Smoking status: Never    Smokeless tobacco: Never   Substance and Sexual Activity    Alcohol use: No    Drug use: Not Currently    Sexual activity: Yes     Partners: Female   Social History Narrative    Patient is originally from [place] , [place] [facility]         School at ; [employer]         [employer]         Working ; printing business        [employer]         Children    5 son - Farzad III        Lives with     Tapan  And 6 y/o son             Diet/Exericse;    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 Jonny Toledo IV, MD    Address: [facility] [place] [address]    Phone: [phone]    Psychiatry                Review of Systems   Constitutional:  Negative for appetite change and unexpected weight change.   HENT:  Negative for trouble swallowing.    Respiratory:  Negative for cough and shortness of breath.    Cardiovascular:  Negative for chest pain.   Gastrointestinal:  Negative for abdominal pain and blood in stool.   Hematological:  Negative for adenopathy. Does not bruise/bleed easily.   Psychiatric/Behavioral:  Negative for dysphoric mood.        Objective:     There were no vitals filed for this visit.       Physical Exam  Constitutional:       General: He is not in acute distress.     Appearance: Normal appearance. He is not ill-appearing.   HENT:      Head: Normocephalic.   Eyes:      Conjunctiva/sclera: Conjunctivae normal.   Pulmonary:      Effort: Pulmonary effort is normal. No respiratory distress.   Skin:     Coloration:  Skin is not jaundiced or pale.   Neurological:      Mental Status: He is alert and oriented to person, place, and time.   Psychiatric:         Mood and Affect: Mood normal.         Behavior: Behavior normal.             Assessment:         ICD-10-CM ICD-9-CM   1. Gastroesophageal reflux disease, unspecified whether esophagitis present  K21.9 530.81   2. Esophageal dysphagia  R13.19 787.29       Plan:       Gastroesophageal reflux disease, unspecified whether esophagitis present; Esophageal dysphagia  -     omeprazole (PRILOSEC) 40 MG capsule; Take 1 capsule (40 mg total) by mouth once daily.  Dispense: 30 capsule; Refill: 5      Follow up if symptoms worsen or fail to improve.     Patient's Medications   New Prescriptions    No medications on file   Previous Medications    CLONAZEPAM (KLONOPIN) 1 MG TABLET    Take 1 mg by mouth every evening.    DEXTROAMPHETAMINE-AMPHETAMINE (ADDERALL XR) 30 MG 24 HR CAPSULE    Take 30 mg by mouth every morning.   Modified Medications    Modified Medication Previous Medication    OMEPRAZOLE (PRILOSEC) 40 MG CAPSULE omeprazole (PRILOSEC) 40 MG capsule       Take 1 capsule (40 mg total) by mouth once daily.    Take 1 capsule (40 mg total) by mouth once daily.   Discontinued Medications    AMOXICILLIN (AMOXIL) 500 MG CAPSULE    Amoxicillin 500mg caps, take 2 capsules 2 times per day for 14 days.  Take all 4 medications together

## 2023-02-10 ENCOUNTER — PATIENT MESSAGE (OUTPATIENT)
Dept: INTERNAL MEDICINE | Facility: CLINIC | Age: 48
End: 2023-02-10
Payer: MEDICAID

## 2023-03-21 ENCOUNTER — TELEPHONE (OUTPATIENT)
Dept: FAMILY MEDICINE | Facility: CLINIC | Age: 48
End: 2023-03-21
Payer: MEDICAID

## 2023-03-24 ENCOUNTER — OFFICE VISIT (OUTPATIENT)
Dept: FAMILY MEDICINE | Facility: CLINIC | Age: 48
End: 2023-03-24
Payer: MEDICAID

## 2023-03-24 VITALS
HEART RATE: 75 BPM | BODY MASS INDEX: 29.65 KG/M2 | OXYGEN SATURATION: 98 % | DIASTOLIC BLOOD PRESSURE: 80 MMHG | HEIGHT: 77 IN | SYSTOLIC BLOOD PRESSURE: 122 MMHG | WEIGHT: 251.13 LBS

## 2023-03-24 DIAGNOSIS — J01.90 ACUTE BACTERIAL SINUSITIS: ICD-10-CM

## 2023-03-24 DIAGNOSIS — H61.21 IMPACTED CERUMEN OF RIGHT EAR: ICD-10-CM

## 2023-03-24 DIAGNOSIS — M25.512 ACUTE PAIN OF LEFT SHOULDER: Primary | ICD-10-CM

## 2023-03-24 DIAGNOSIS — B96.89 ACUTE BACTERIAL SINUSITIS: ICD-10-CM

## 2023-03-24 PROCEDURE — 1160F PR REVIEW ALL MEDS BY PRESCRIBER/CLIN PHARMACIST DOCUMENTED: ICD-10-PCS | Mod: CPTII,,,

## 2023-03-24 PROCEDURE — 3079F PR MOST RECENT DIASTOLIC BLOOD PRESSURE 80-89 MM HG: ICD-10-PCS | Mod: CPTII,,,

## 2023-03-24 PROCEDURE — 3074F SYST BP LT 130 MM HG: CPT | Mod: CPTII,,,

## 2023-03-24 PROCEDURE — 1159F MED LIST DOCD IN RCRD: CPT | Mod: CPTII,,,

## 2023-03-24 PROCEDURE — 3079F DIAST BP 80-89 MM HG: CPT | Mod: CPTII,,,

## 2023-03-24 PROCEDURE — 99214 PR OFFICE/OUTPT VISIT, EST, LEVL IV, 30-39 MIN: ICD-10-PCS | Mod: S$PBB,,,

## 2023-03-24 PROCEDURE — 3074F PR MOST RECENT SYSTOLIC BLOOD PRESSURE < 130 MM HG: ICD-10-PCS | Mod: CPTII,,,

## 2023-03-24 PROCEDURE — 1159F PR MEDICATION LIST DOCUMENTED IN MEDICAL RECORD: ICD-10-PCS | Mod: CPTII,,,

## 2023-03-24 PROCEDURE — 99999 PR PBB SHADOW E&M-EST. PATIENT-LVL V: CPT | Mod: PBBFAC,,,

## 2023-03-24 PROCEDURE — 99215 OFFICE O/P EST HI 40 MIN: CPT | Mod: PBBFAC,PN

## 2023-03-24 PROCEDURE — 99999 PR PBB SHADOW E&M-EST. PATIENT-LVL V: ICD-10-PCS | Mod: PBBFAC,,,

## 2023-03-24 PROCEDURE — 3008F PR BODY MASS INDEX (BMI) DOCUMENTED: ICD-10-PCS | Mod: CPTII,,,

## 2023-03-24 PROCEDURE — 99214 OFFICE O/P EST MOD 30 MIN: CPT | Mod: S$PBB,,,

## 2023-03-24 PROCEDURE — 3008F BODY MASS INDEX DOCD: CPT | Mod: CPTII,,,

## 2023-03-24 PROCEDURE — 1160F RVW MEDS BY RX/DR IN RCRD: CPT | Mod: CPTII,,,

## 2023-03-24 RX ORDER — CETIRIZINE HYDROCHLORIDE 10 MG/1
10 TABLET ORAL DAILY PRN
COMMUNITY
Start: 2023-02-27

## 2023-03-24 RX ORDER — DOXYCYCLINE 100 MG/1
100 CAPSULE ORAL 2 TIMES DAILY
Qty: 20 CAPSULE | Refills: 0 | Status: SHIPPED | OUTPATIENT
Start: 2023-03-24 | End: 2023-04-03

## 2023-03-24 RX ORDER — FLUTICASONE PROPIONATE 50 MCG
1 SPRAY, SUSPENSION (ML) NASAL DAILY
COMMUNITY

## 2023-03-24 RX ORDER — IBUPROFEN 800 MG/1
800 TABLET ORAL EVERY 8 HOURS PRN
COMMUNITY
End: 2024-02-08

## 2023-03-24 NOTE — PROGRESS NOTES
Subjective:         Chief Complaint: Sinus Problem (Started 3 or 4 weeks ago ), Fatigue, and Shoulder Pain (Stated he would need a referral to see ortho  )     Farzad Moore is a 47 y.o. male, patient of Reza Terrell III, MD with ADHD, recurrent sinus infections, unknown to me, presents today with complaints of Sinus Problem (Started 3 or 4 weeks ago ), Fatigue, and Shoulder Pain (Stated he would need a referral to see ortho  )    Patient reports sinus infection. History of sinuoplasty about a year ago with GNO. Currently using flonase, keeps getting infections that wont go away. Went to ENT 2 months ago and they put him on steroid nose spray. Also doing nasal lavage which works but about a month ago he started with a sinus infection. Has been treated with multiple antibiotics in the past.   Also taking Zyrtec.     Reports he hurt his left shoulder at work. Did not report it. Currently has pain to the left shoulder that comes and goes. It's not a constant or lingering pain it just comes and goes. Has not tried anything as it does not hurt all the time. His wife wants him to go see an orthopedist so he is requesting a referral.         Sinus Problem  This is a recurrent problem. The current episode started more than 1 month ago. The problem has been gradually worsening since onset. There has been no fever. His pain is at a severity of 0/10. He is experiencing no pain. Associated symptoms include chills, congestion, coughing (nonproductive cough), diaphoresis, sinus pressure and sneezing. Pertinent negatives include no ear pain, headaches, neck pain, shortness of breath, sore throat or swollen glands. Past treatments include saline nose sprays and nasal decongestants (Zyrtec). The treatment provided mild relief.   Shoulder Pain   The pain is present in the left shoulder. This is a new problem. The current episode started 1 to 4 weeks ago. History of extremity trauma: was using hydraulic tool at work pulling down on  it, feels like he pulled down too hard.. The problem occurs intermittently. The problem has been gradually improving. The quality of the pain is described as sharp. The pain is at a severity of 0/10. Pertinent negatives include no headaches, limited range of motion, numbness or tingling. He has tried nothing for the symptoms. Family history does not include arthritis. There is no history of diabetes, Injuries to Extremity or migraines.     Past Medical History:   Diagnosis Date    ADHD     Right inguinal hernia 12/14/2018       Past Surgical History:   Procedure Laterality Date    ESOPHAGOGASTRODUODENOSCOPY N/A 11/11/2022    Procedure: EGD (ESOPHAGOGASTRODUODENOSCOPY);  Surgeon: Baylee Flores MD;  Location: [facility];  Service: Endoscopy;  Laterality: N/A;    HERNIA REPAIR      KNEE SURGERY         Family History   Problem Relation Age of Onset    Diabetes Mother     Heart attack Father 38    Lung cancer Father     Heart attack Paternal Uncle 50    Colon cancer Neg Hx     Crohn's disease Neg Hx     Ulcerative colitis Neg Hx     Prostate cancer Neg Hx        Social History     Socioeconomic History    Marital status:     Number of children: 1   Occupational History    Occupation: printing business   Tobacco Use    Smoking status: Never    Smokeless tobacco: Never   Substance and Sexual Activity    Alcohol use: No    Drug use: Not Currently    Sexual activity: Yes     Partners: Female   Social History Narrative    Patient is originally from [place] , [place] [facility]         School at ; [employer]         Working ; printing business        [employer]         Children    5 son - Farzad III        Lives with     [place]  And 4 y/o son             Diet/Exericse;    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 Jonny Toledo IV, MD    Address: [facility] [place] [address]    Phone: [phone]    Psychiatry              Social Determinants of Health  "    Financial Resource Strain: Low Risk     Difficulty of Paying Living Expenses: Not hard at all   Food Insecurity: No Food Insecurity    Worried About Running Out of Food in the Last Year: Never true    Ran Out of Food in the Last Year: Never true   Transportation Needs: No Transportation Needs    Lack of Transportation (Medical): No    Lack of Transportation (Non-Medical): No   Physical Activity: Insufficiently Active    Days of Exercise per Week: 3 days    Minutes of Exercise per Session: 30 min   Stress: No Stress Concern Present    Feeling of Stress : Not at all   Social Connections: Moderately Isolated    Frequency of Communication with Friends and Family: Twice a week    Frequency of Social Gatherings with Friends and Family: Twice a week    Attends Orthodox Services: Never    Active Member of Clubs or Organizations: No    Attends Club or Organization Meetings: Never    Marital Status:    Housing Stability: Low Risk     Unable to Pay for Housing in the Last Year: No    Number of Places Lived in the Last Year: 1    Unstable Housing in the Last Year: No       Review of Systems   Constitutional:  Positive for chills and diaphoresis.   HENT:  Positive for congestion, postnasal drip, rhinorrhea (green), sinus pressure and sneezing. Negative for ear pain, sinus pain and sore throat.    Respiratory:  Positive for cough (nonproductive cough). Negative for shortness of breath.    Musculoskeletal:  Positive for arthralgias and myalgias. Negative for neck pain.   Neurological:  Negative for tingling, numbness and headaches.       Objective:     Vitals:    03/24/23 1002   BP: 122/80   Pulse: 75   SpO2: 98%   Weight: 113.9 kg (251 lb 1.6 oz)   Height: 6' 5" (1.956 m)          Physical Exam  Vitals reviewed.   Constitutional:       Appearance: Normal appearance. He is well-developed.   HENT:      Head: Normocephalic and atraumatic.      Right Ear: Decreased hearing noted. There is impacted cerumen.      Left Ear: " Tympanic membrane normal.      Ears:      Comments: Large amount of cerumen removed from right ear with curette, patient tolerated well, reports he can hear much better.      Nose: Congestion and rhinorrhea present.      Right Turbinates: Swollen.      Right Sinus: Maxillary sinus tenderness present. No frontal sinus tenderness.      Left Sinus: No maxillary sinus tenderness or frontal sinus tenderness.   Eyes:      General: No scleral icterus.     Extraocular Movements: Extraocular movements intact.   Cardiovascular:      Rate and Rhythm: Regular rhythm.      Heart sounds: Normal heart sounds.   Pulmonary:      Effort: Pulmonary effort is normal.      Breath sounds: Normal breath sounds.   Musculoskeletal:      Left shoulder: Tenderness present. No swelling. Normal range of motion.      Comments: Mild anterolateral pain with abduction and reaching, pain with pulling arm backwards. Negatgive drop arm test. No TTP.    Skin:     General: Skin is warm and dry.   Neurological:      General: No focal deficit present.      Mental Status: He is alert and oriented to person, place, and time.   Psychiatric:         Mood and Affect: Mood normal.         Speech: Speech normal.         Behavior: Behavior is cooperative.           Assessment:         ICD-10-CM ICD-9-CM   1. Acute pain of left shoulder  M25.512 719.41   2. Impacted cerumen of right ear  H61.21 380.4   3. Acute bacterial sinusitis  J01.90 461.9    B96.89        Plan:       Acute pain of left shoulder  -     Ambulatory referral/consult to Orthopedics; Future; Expected date: 03/31/2023  Continue to use ibuprofen as needed for pain.   Use ice a few times per day for 15 minutes at a time.  Try OTC pain creams.   Referral placed to orthopedics per patient request.     Impacted cerumen of right ear  Instructed patient to use OTC debrox as needed.     Acute bacterial sinusitis  -     doxycycline (VIBRAMYCIN) 100 MG Cap; Take 1 capsule (100 mg total) by mouth 2 (two)  times daily. for 10 days  Dispense: 20 capsule; Refill: 0  Continue nasal sprays and nettipot. Continue antihistamine.   Take Doxycycline with food to prevent upset stomach.   Follow up with ENT as needed.       If symptoms worsen, go to ER.  If symptoms do not improve, return to clinic.   Keep appointments with all specialists.     Patient verbalizes understanding and agrees with current treatment plan.    Follow up in about 3 months (around 6/24/2023).     Patient's Medications   New Prescriptions    DOXYCYCLINE (VIBRAMYCIN) 100 MG CAP    Take 1 capsule (100 mg total) by mouth 2 (two) times daily. for 10 days   Previous Medications    CETIRIZINE (ZYRTEC) 10 MG TABLET    Take 10 mg by mouth daily as needed.    CLONAZEPAM (KLONOPIN) 1 MG TABLET    Take 1 mg by mouth every evening.    DEXTROAMPHETAMINE-AMPHETAMINE (ADDERALL XR) 30 MG 24 HR CAPSULE    Take 30 mg by mouth every morning.    FLUTICASONE PROPIONATE (FLONASE) 50 MCG/ACTUATION NASAL SPRAY    1 spray by Each Nostril route once daily.    IBUPROFEN (ADVIL,MOTRIN) 800 MG TABLET    Take 800 mg by mouth every 8 (eight) hours as needed for Pain.    OMEPRAZOLE (PRILOSEC) 40 MG CAPSULE    Take 1 capsule (40 mg total) by mouth once daily.   Modified Medications    No medications on file   Discontinued Medications    No medications on file         Rosita Aldana NP

## 2023-03-24 NOTE — PATIENT INSTRUCTIONS
Continue nasal sprays and nettipot. Continue antihistamine.   Take Doxycycline with food to prevent upset stomach.   Follow up with ENT as needed.   Keep appt with PCP.

## 2023-05-23 ENCOUNTER — PATIENT OUTREACH (OUTPATIENT)
Dept: ADMINISTRATIVE | Facility: HOSPITAL | Age: 48
End: 2023-05-23
Payer: MEDICAID

## 2023-05-23 ENCOUNTER — PATIENT MESSAGE (OUTPATIENT)
Dept: ADMINISTRATIVE | Facility: HOSPITAL | Age: 48
End: 2023-05-23
Payer: MEDICAID

## 2023-05-23 NOTE — PROGRESS NOTES
Care Everywhere updates requested and reviewed.  Immunizations reconciled. Media reports reviewed.  Duplicate HM overrides and  orders removed.  Overdue HM topic chart audit and/or requested.  Overdue lab testing linked to upcoming lab appointments if applies.          Health Maintenance Due   Topic Date Due    TETANUS VACCINE  Never done    COVID-19 Vaccine (3 - Booster for Wilian series) 02/10/2022    Colorectal Cancer Screening  Never done

## 2023-06-22 DIAGNOSIS — M25.512 LEFT SHOULDER PAIN, UNSPECIFIED CHRONICITY: Primary | ICD-10-CM

## 2023-06-22 NOTE — PROGRESS NOTES
Subjective:      Patient ID: Farzad Moore is a 48 y.o. male.    Chief Complaint: Pain and Injury of the Left Shoulder (Patient presents today as a new patient stating he was working out a few days ago and stating having pain in his left shoulder after work out. )      HPI  (French)    History of bilateral shoulder pain over the years- was college . Has injured both shoulders over the years.     He had injuries to shoulder at work- last was in March. He was working out on Saturday (lifting) and had some right shoulder pain, once he got home this improved. Now with intermittent left shoulder pain and instability. He feels like the left shoulder is popping out and sliding back in. Pain is worse with specific movements (not sure what). Some numbness/tingling in left arm at night. He rates his pain as a 4 on a scale of 1-10.     No PT, injections, or surgery on his shoulders. He is taking motrin with some relief.       Past Medical History:   Diagnosis Date    ADHD     Right inguinal hernia 12/14/2018         Current Outpatient Medications:     cetirizine (ZYRTEC) 10 MG tablet, Take 10 mg by mouth daily as needed., Disp: , Rfl:     clonazePAM (KLONOPIN) 1 MG tablet, Take 1 mg by mouth every evening., Disp: , Rfl:     dextroamphetamine-amphetamine (ADDERALL XR) 30 MG 24 hr capsule, Take 30 mg by mouth every morning., Disp: , Rfl:     fluticasone propionate (FLONASE) 50 mcg/actuation nasal spray, 1 spray by Each Nostril route once daily., Disp: , Rfl:     ibuprofen (ADVIL,MOTRIN) 800 MG tablet, Take 800 mg by mouth every 8 (eight) hours as needed for Pain., Disp: , Rfl:     omeprazole (PRILOSEC) 40 MG capsule, Take 1 capsule (40 mg total) by mouth once daily., Disp: 30 capsule, Rfl: 5    Review of patient's allergies indicates:  No Known Allergies    Review of Systems   Constitutional: Negative for chills, fever, night sweats and weight gain.   Gastrointestinal:  Negative for bowel incontinence, nausea  "and vomiting.   Genitourinary:  Negative for bladder incontinence.   Neurological:  Negative for disturbances in coordination and loss of balance.         Objective:        Ht 6' 5" (1.956 m)   Wt 110.4 kg (243 lb 6.4 oz)   BMI 28.86 kg/m²     General    Vitals reviewed.  Constitutional: He is oriented to person, place, and time. He appears well-developed and well-nourished.   Pulmonary/Chest: Effort normal.   Abdominal: He exhibits no distension.   Neurological: He is alert and oriented to person, place, and time.   Psychiatric: He has a normal mood and affect. His behavior is normal. Judgment and thought content normal.         ROM OF BOTH SHOULDERS:  Active flexion to 180° on left and 180° on right.   Active abduction to 160° on left and 160° on right.    Some pain with IR/ER on left.     Strength Testing of both UEs:  Deltoid - 5/5 on left and 5/5 on right  Biceps - 5/5 on left and 5/5 on right  Triceps - 5/5 on left and 5/5 on right  Wrist extension - 5/5 on left and 5/5 on right  Wrist flexion - 5/5 on left and 5/5 on right   - 5/5 on left and 5/5 on right    RIGHT SHOULDER EXAM:  Tenderness:  No tenderness at the SC or AC joint  No tenderness over the clavicle   No tenderness over biceps tendon or bicipital groove  No tenderness over subacromial space    Special Tests:  Empty can test - negative  Full can test - negative  Resisted internal rotation - negative  Resisted external rotation - negative    Neer's test - negative  Hawkin's-Carter test - negative    Speed's test - negative  Yergason's test - negative    Sulcus sign - none  AP load and shift laxity - none    LEFT SHOULDER EXAM:  Tenderness:  No tenderness at the SC or AC joint  No tenderness over the clavicle   No tenderness over biceps tendon or bicipital groove  No tenderness over subacromial space    Special Tests:  Empty can test - positive for pain  Full can test - positive for pain  Resisted internal rotation - negative  Resisted external " rotation - positive    Neer's test - positive  Hawkin's-Carter test - positive    Speed's test - negative  Yergason's test - negative    Sulcus sign - none  AP load and shift laxity - none    Neurovascular Exam Bilateral UEs:  Sensation intact to light touch in the distal median, radial, and ulnar nerve distributions bilaterally.  Capillary refill intact <2 seconds in all digits bilaterally      XRAY INTERPRETATION:   X-rays of left shoulder dated 6/26/23 are personally reviewed and show no fracture or dislocation.         Assessment:       Encounter Diagnosis   Name Primary?    Chronic left shoulder pain           Plan:       Farzad was seen today for pain and injury.    Diagnoses and all orders for this visit:    Chronic left shoulder pain  -     Ambulatory referral/consult to Orthopedics  -     Ambulatory referral/consult to Physical/Occupational Therapy; Future      History of bilateral shoulder pain over the years- was college . Has injured both shoulders over the years.     He had injuries to shoulder at work- last was in March. He was working out on Saturday (lifting) and had some right shoulder pain, once he got home this improved. Now with intermittent left shoulder pain and instability. He feels like the left shoulder is popping out and sliding back in.     XRs of left shoulder look okay. No gross instability on exam.     Treatment options reviewed with patient along with above left shoulder xrays. Following plan made:     - PT orders for left shoulder sent to [facility].   - Continue on motrin. Reviewed dosing and side effects. Take with food.   - If no improvement with above, consider MRI of left shoulder.     Follow up in about 6 weeks (around 8/7/2023).

## 2023-06-26 ENCOUNTER — OFFICE VISIT (OUTPATIENT)
Dept: ORTHOPEDICS | Facility: CLINIC | Age: 48
End: 2023-06-26
Payer: MEDICAID

## 2023-06-26 VITALS — HEIGHT: 77 IN | BODY MASS INDEX: 28.74 KG/M2 | WEIGHT: 243.38 LBS

## 2023-06-26 DIAGNOSIS — M25.512 CHRONIC LEFT SHOULDER PAIN: ICD-10-CM

## 2023-06-26 DIAGNOSIS — G89.29 CHRONIC LEFT SHOULDER PAIN: ICD-10-CM

## 2023-06-26 PROCEDURE — 99999 PR PBB SHADOW E&M-EST. PATIENT-LVL IV: ICD-10-PCS | Mod: PBBFAC,,, | Performed by: PHYSICIAN ASSISTANT

## 2023-06-26 PROCEDURE — 99999 PR PBB SHADOW E&M-EST. PATIENT-LVL IV: CPT | Mod: PBBFAC,,, | Performed by: PHYSICIAN ASSISTANT

## 2023-06-26 PROCEDURE — 99214 OFFICE O/P EST MOD 30 MIN: CPT | Mod: PBBFAC,PN | Performed by: PHYSICIAN ASSISTANT

## 2023-06-26 PROCEDURE — 1160F PR REVIEW ALL MEDS BY PRESCRIBER/CLIN PHARMACIST DOCUMENTED: ICD-10-PCS | Mod: CPTII,,, | Performed by: PHYSICIAN ASSISTANT

## 2023-06-26 PROCEDURE — 99203 PR OFFICE/OUTPT VISIT, NEW, LEVL III, 30-44 MIN: ICD-10-PCS | Mod: S$PBB,,, | Performed by: PHYSICIAN ASSISTANT

## 2023-06-26 PROCEDURE — 99203 OFFICE O/P NEW LOW 30 MIN: CPT | Mod: S$PBB,,, | Performed by: PHYSICIAN ASSISTANT

## 2023-06-26 PROCEDURE — 1160F RVW MEDS BY RX/DR IN RCRD: CPT | Mod: CPTII,,, | Performed by: PHYSICIAN ASSISTANT

## 2023-06-26 PROCEDURE — 3008F BODY MASS INDEX DOCD: CPT | Mod: CPTII,,, | Performed by: PHYSICIAN ASSISTANT

## 2023-06-26 PROCEDURE — 1159F PR MEDICATION LIST DOCUMENTED IN MEDICAL RECORD: ICD-10-PCS | Mod: CPTII,,, | Performed by: PHYSICIAN ASSISTANT

## 2023-06-26 PROCEDURE — 3008F PR BODY MASS INDEX (BMI) DOCUMENTED: ICD-10-PCS | Mod: CPTII,,, | Performed by: PHYSICIAN ASSISTANT

## 2023-06-26 PROCEDURE — 1159F MED LIST DOCD IN RCRD: CPT | Mod: CPTII,,, | Performed by: PHYSICIAN ASSISTANT

## 2023-06-26 NOTE — PATIENT INSTRUCTIONS
It was nice to meet you today!     Your shoulder xrays look okay.     Continue on motrin to help with pain/inflammation. Take as directed with food.     I sent physical therapy orders to [facility] here. They should call you to set up, but if not you can call [phone].     If no improvement with above, we can consider an MRI of your shoulder.     Arie will see you back in 6 weeks, but please stay in touch and call if you need anything. You can also send a message in [facility].     Kelly   [phone]

## 2023-12-04 ENCOUNTER — OFFICE VISIT (OUTPATIENT)
Dept: ORTHOPEDICS | Facility: CLINIC | Age: 48
End: 2023-12-04
Payer: COMMERCIAL

## 2023-12-04 ENCOUNTER — HOSPITAL ENCOUNTER (OUTPATIENT)
Dept: RADIOLOGY | Facility: HOSPITAL | Age: 48
Discharge: HOME | End: 2023-12-04
Attending: NURSE PRACTITIONER
Payer: COMMERCIAL

## 2023-12-04 DIAGNOSIS — G89.29 CHRONIC LEFT SHOULDER PAIN: Primary | ICD-10-CM

## 2023-12-04 DIAGNOSIS — G89.29 CHRONIC LEFT SHOULDER PAIN: ICD-10-CM

## 2023-12-04 DIAGNOSIS — M25.512 CHRONIC LEFT SHOULDER PAIN: Primary | ICD-10-CM

## 2023-12-04 DIAGNOSIS — M25.512 CHRONIC LEFT SHOULDER PAIN: ICD-10-CM

## 2023-12-04 PROCEDURE — 1160F RVW MEDS BY RX/DR IN RCRD: CPT | Mod: CPTII,S$GLB,, | Performed by: NURSE PRACTITIONER

## 2023-12-04 PROCEDURE — 73030 XR SHOULDER COMPLETE 2 OR MORE VIEWS LEFT: ICD-10-PCS | Mod: 26,LT,, | Performed by: RADIOLOGY

## 2023-12-04 PROCEDURE — 1159F PR MEDICATION LIST DOCUMENTED IN MEDICAL RECORD: ICD-10-PCS | Mod: CPTII,S$GLB,, | Performed by: NURSE PRACTITIONER

## 2023-12-04 PROCEDURE — 99999 PR PBB SHADOW E&M-EST. PATIENT-LVL III: CPT | Mod: PBBFAC,,, | Performed by: NURSE PRACTITIONER

## 2023-12-04 PROCEDURE — 73030 X-RAY EXAM OF SHOULDER: CPT | Mod: 26,LT,, | Performed by: RADIOLOGY

## 2023-12-04 PROCEDURE — 99214 PR OFFICE/OUTPT VISIT, EST, LEVL IV, 30-39 MIN: ICD-10-PCS | Mod: S$GLB,,, | Performed by: NURSE PRACTITIONER

## 2023-12-04 PROCEDURE — 99214 OFFICE O/P EST MOD 30 MIN: CPT | Mod: S$GLB,,, | Performed by: NURSE PRACTITIONER

## 2023-12-04 PROCEDURE — 1160F PR REVIEW ALL MEDS BY PRESCRIBER/CLIN PHARMACIST DOCUMENTED: ICD-10-PCS | Mod: CPTII,S$GLB,, | Performed by: NURSE PRACTITIONER

## 2023-12-04 PROCEDURE — 73030 X-RAY EXAM OF SHOULDER: CPT | Mod: TC,LT

## 2023-12-04 PROCEDURE — 1159F MED LIST DOCD IN RCRD: CPT | Mod: CPTII,S$GLB,, | Performed by: NURSE PRACTITIONER

## 2023-12-04 PROCEDURE — 99999 PR PBB SHADOW E&M-EST. PATIENT-LVL III: ICD-10-PCS | Mod: PBBFAC,,, | Performed by: NURSE PRACTITIONER

## 2023-12-04 RX ORDER — METHOCARBAMOL 500 MG/1
500 TABLET, FILM COATED ORAL 4 TIMES DAILY
Qty: 40 TABLET | Refills: 0 | Status: SHIPPED | OUTPATIENT
Start: 2023-12-04 | End: 2023-12-14

## 2023-12-04 RX ORDER — METHYLPREDNISOLONE 4 MG/1
TABLET ORAL
Qty: 1 EACH | Refills: 0 | Status: SHIPPED | OUTPATIENT
Start: 2023-12-04 | End: 2023-12-25

## 2023-12-04 NOTE — PROGRESS NOTES
SUBJECTIVE:     Chief Complaint & History of Present Illness:  Farzad Moore is a New 48 y.o. year old male patient presenting with constant left shoulder pain that started about 6 months ago.  He is Right hand dominant.  There is not a history of injury.  The pain is located in the anterior, posterior, and  lateral aspect of the shoulder.  The pain is described as burning, 8-9/10.  It is aggravated by elevation, lifting, work, and weight-lifting.  Associated symptoms include weakness.  Previous treatments include OTC NSAIDS, rest, and avoidance of the inciting activity which have provided adequate relief.  There is not a history of previous injury or surgery to the shoulder.  He reports he played a lot of sports in his youth in had injured his right shoulder.  He also functions as a personal training.  He states he feels something slipping in the shoulder joint.  He states the pain does radiate around the shoulder towards his back and towards his neck.  He was seen by another provider back in June who referred him to physical therapy.  He reports he did not go to physical therapy as after rest his symptoms seemed to improve.  Unfortunately has pain still persist.  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    Review of patient's allergies indicates:  No Known Allergies      Current Outpatient Medications   Medication Sig Dispense Refill    cetirizine (ZYRTEC) 10 MG tablet Take 10 mg by mouth daily as needed.      clonazePAM (KLONOPIN) 1 MG tablet Take 1 mg by mouth every evening.      dextroamphetamine-amphetamine (ADDERALL XR) 30 MG 24 hr capsule Take 30 mg by mouth every morning.      fluticasone propionate (FLONASE) 50 mcg/actuation nasal spray 1 spray by Each Nostril route once daily.      ibuprofen (ADVIL,MOTRIN) 800 MG tablet Take 800 mg by mouth every 8 (eight) hours as needed for Pain.      omeprazole (PRILOSEC)  "40 MG capsule Take 1 capsule (40 mg total) by mouth once daily. 30 capsule 5     No current facility-administered medications for this visit.       Past Medical History:   Diagnosis Date    ADHD     Right inguinal hernia 12/14/2018       Past Surgical History:   Procedure Laterality Date    ESOPHAGOGASTRODUODENOSCOPY N/A 11/11/2022    Procedure: EGD (ESOPHAGOGASTRODUODENOSCOPY);  Surgeon: Baylee Flores MD;  Location: [facility];  Service: Endoscopy;  Laterality: N/A;    HERNIA REPAIR      KNEE SURGERY         Family History   Problem Relation Age of Onset    Diabetes Mother     Heart attack Father 38    Lung cancer Father     Heart attack Paternal Uncle 50    Colon cancer Neg Hx     Crohn's disease Neg Hx     Ulcerative colitis Neg Hx     Prostate cancer Neg Hx      Review of Systems:  ROS:  Constitutional: no fever or chills  Eyes: no visual changes  ENT: no nasal congestion or sore throat  Respiratory: no cough or shortness of breath  Cardiovascular: no chest pain or palpitations  Gastrointestinal: no nausea or vomiting, tolerating diet  Genitourinary: no hematuria or dysuria  Integument/Breast: no rash or pruritis  Hematologic/Lymphatic: no easy bruising or lymphadenopathy  Musculoskeletal:  Left shoulder pain  Neurological: no seizures or tremors  Behavioral/Psych: no auditory or visual hallucinations  Endocrine: no heat or cold intolerance      OBJECTIVE:     PHYSICAL EXAM:  Vital Signs (Most Recent)  There were no vitals filed for this visit.     ,   Estimated body mass index is 28.86 kg/m² as calculated from the following:    Height as of 6/26/23: 6' 5" (1.956 m).    Weight as of 6/26/23: 110.4 kg (243 lb 6.4 oz).   General Appearance: Well nourished, well developed, in no acute distress.  HENT: Normal cephalic, oropharynx pink and moist  Eyes: PERRLA bilaterally and EOM x 4  Respiratory: Even and unlabored  Skin: Warm and Dry.   Psychiatric: AAO x 4, Mood & affect are normal.    Shoulder exam: " left  Tenderness: AC joint, lateral acromial, posterior acromial, trapezius muscle  ROM: forward flexion 100 / 180, extension Normal / Normal, full abduction 80 / 180, abduction - glenohumeral 40 / 80, external rotation 40 / 50, internal rotation L5 / L2  Shoulder Strength: biceps 5/5, triceps 5/5, abduction 3/5, adduction 4/5, external rotation with shoulder at side 4/5, flexion 4/5, extension 5/5  positive for tenderness about the glenohumeral joint, sensory exam normal, and radial pulse intact    RADIOGRAPHS:  X-ray of the left shoulder was obtained, findings show no acute fractures.  All radiographs were personally reviewed by me.    ASSESSMENT/PLAN:       ICD-10-CM ICD-9-CM   1. Chronic left shoulder pain  M25.512 719.41    G89.29 338.29     -Farzad Moore presents to clinic with c/c left shoulder pain for the past 6 months.  No recent trauma.  -X-ray as above.  -Recommend RICE therapy.    48-year-old male who presents to Orthopedics with chief complaint of persistent left shoulder pain for the past 6 months.  He has treated her symptoms with avoidance and over-the-counter NSAIDs but finds the NSAIDs are given him a GI issues.  He is not done formal physical therapy.  He feels a burning pain about his shoulder joint that radiates towards his back in towards his neck.  Denies any paresthesia down his arm.    Patient has difficulty with rotation of the shoulder joint.  I suspect possible rotator cuff issues and therefore we will get a MRI of his left shoulder.  I advised the patient I will call him with additional recommendations once the MRI has been completed.  In the meantime I will prescribe Robaxin 500 mg 4 times a day and a Medrol Dosepak.  I informed the patient if the MRIs normal we will refer him to formal physical therapy and may consider a shoulder joint injection.

## 2023-12-18 ENCOUNTER — PATIENT MESSAGE (OUTPATIENT)
Dept: ORTHOPEDICS | Facility: CLINIC | Age: 48
End: 2023-12-18
Payer: COMMERCIAL

## 2023-12-19 ENCOUNTER — TELEPHONE (OUTPATIENT)
Dept: ORTHOPEDICS | Facility: CLINIC | Age: 48
End: 2023-12-19
Payer: COMMERCIAL

## 2023-12-19 NOTE — TELEPHONE ENCOUNTER
Looks like he has a slap tear as well as tears in his biceps.  Can we get him in with one of the sports MD's.  Call patient with date and time please.

## 2023-12-19 NOTE — TELEPHONE ENCOUNTER
Called and scheduled pt's appt with Dr. Farris on 12/28 @ 9:30 am for his left shoulder. Pt was satisfied with appt date/time.

## 2023-12-19 NOTE — TELEPHONE ENCOUNTER
I spoke with patient regarding his left shoulder MRI results.  Patient appears to have a SLAP tear as well as some tears in his biceps.  He has been dealing with this for several months.  I will refer him to Orthopedic Sports for further treatment and evaluation.  All the patient's questions were answered at the time of the call.

## 2024-01-02 ENCOUNTER — OFFICE VISIT (OUTPATIENT)
Dept: SPORTS MEDICINE | Facility: CLINIC | Age: 49
End: 2024-01-02
Payer: COMMERCIAL

## 2024-01-02 VITALS
HEIGHT: 77 IN | DIASTOLIC BLOOD PRESSURE: 92 MMHG | HEART RATE: 92 BPM | BODY MASS INDEX: 28.24 KG/M2 | WEIGHT: 239.19 LBS | SYSTOLIC BLOOD PRESSURE: 143 MMHG

## 2024-01-02 DIAGNOSIS — M75.22 BICEPS TENDONITIS ON LEFT: ICD-10-CM

## 2024-01-02 DIAGNOSIS — M75.112 NONTRAUMATIC INCOMPLETE TEAR OF LEFT ROTATOR CUFF: ICD-10-CM

## 2024-01-02 DIAGNOSIS — S43.432A SUPERIOR GLENOID LABRUM LESION OF LEFT SHOULDER, INITIAL ENCOUNTER: ICD-10-CM

## 2024-01-02 DIAGNOSIS — S43.432A SLAP LESION OF LEFT SHOULDER: Primary | ICD-10-CM

## 2024-01-02 DIAGNOSIS — M19.012 OSTEOARTHRITIS OF GLENOHUMERAL JOINT, LEFT: ICD-10-CM

## 2024-01-02 DIAGNOSIS — M75.100 TEAR OF ROTATOR CUFF: ICD-10-CM

## 2024-01-02 DIAGNOSIS — M75.20: ICD-10-CM

## 2024-01-02 DIAGNOSIS — M19.012 LOCALIZED OSTEOARTHROSIS, SHOULDER REGION, LEFT: Primary | ICD-10-CM

## 2024-01-02 PROCEDURE — 99999 PR PBB SHADOW E&M-EST. PATIENT-LVL III: CPT | Mod: PBBFAC,,, | Performed by: ORTHOPAEDIC SURGERY

## 2024-01-02 PROCEDURE — 3008F BODY MASS INDEX DOCD: CPT | Mod: CPTII,S$GLB,, | Performed by: ORTHOPAEDIC SURGERY

## 2024-01-02 PROCEDURE — 3077F SYST BP >= 140 MM HG: CPT | Mod: CPTII,S$GLB,, | Performed by: ORTHOPAEDIC SURGERY

## 2024-01-02 PROCEDURE — 3080F DIAST BP >= 90 MM HG: CPT | Mod: CPTII,S$GLB,, | Performed by: ORTHOPAEDIC SURGERY

## 2024-01-02 PROCEDURE — 99205 OFFICE O/P NEW HI 60 MIN: CPT | Mod: S$GLB,,, | Performed by: ORTHOPAEDIC SURGERY

## 2024-01-02 NOTE — PROGRESS NOTES
CC: Left shoulder pain     48 y.o. Male presents as a new patient to me. He is right-hand dominant.  Accompanied by his wife.  He  works in manufacturing power tools.  Normal job duties require a good bit of lifting and reaching out away from himself.  Complaint is left shoulder pain x since around July of 2023.  Reports he went to the gym and when he came home his left shoulder just felt odd and having some popping of the shoulder.  He denies hearing or feeling a pop in the gym that day.  No discrete injury mechanism.  Unfortunately the pain and mechanical symptoms have worsened with time.  This is despite initial conservative treatment to include a home therapy program, oral medication, rest, activity modifications.  He has seen multiple orthopedic providers in the past.  No injection.  This is a quality of life issue for him as he has been unable to return to work at the needed capacity to do his job appropriately.   Pain localizes anteriorly over the proximal biceps groove and posterolateral and deep over the posterior glenohumeral joint line. Worse with any overhead or rotational movement of the shoulder. Pain is often disruptive to sleep at night. Better with rest. Denies neck pain or radicular symptoms.  No subjective instability. Here today to discuss diagnosis and treatment options.     PMHx notable for ADHD.   Negative for tobacco.   Negative for diabetes.     Pain Score:   3    PAST MEDICAL HISTORY:   Past Medical History:   Diagnosis Date    ADHD     Right inguinal hernia 12/14/2018     PAST SURGICAL HISTORY:  Past Surgical History:   Procedure Laterality Date    ESOPHAGOGASTRODUODENOSCOPY N/A 11/11/2022    Procedure: EGD (ESOPHAGOGASTRODUODENOSCOPY);  Surgeon: Baylee Flores MD;  Location: [facility];  Service: Endoscopy;  Laterality: N/A;    HERNIA REPAIR      KNEE SURGERY       FAMILY HISTORY:  Family History   Problem Relation Age of Onset    Diabetes Mother     Heart attack Father 38    Lung  "cancer Father     Heart attack Paternal Uncle 50    Colon cancer Neg Hx     Crohn's disease Neg Hx     Ulcerative colitis Neg Hx     Prostate cancer Neg Hx      MEDICATIONS:    Current Outpatient Medications:     cetirizine (ZYRTEC) 10 MG tablet, Take 10 mg by mouth daily as needed., Disp: , Rfl:     clonazePAM (KLONOPIN) 1 MG tablet, Take 1 mg by mouth every evening., Disp: , Rfl:     dextroamphetamine-amphetamine (ADDERALL XR) 30 MG 24 hr capsule, Take 30 mg by mouth every morning., Disp: , Rfl:     fluticasone propionate (FLONASE) 50 mcg/actuation nasal spray, 1 spray by Each Nostril route once daily., Disp: , Rfl:     ibuprofen (ADVIL,MOTRIN) 800 MG tablet, Take 800 mg by mouth every 8 (eight) hours as needed for Pain., Disp: , Rfl:     omeprazole (PRILOSEC) 40 MG capsule, Take 1 capsule (40 mg total) by mouth once daily., Disp: 30 capsule, Rfl: 5    ALLERGIES:  Review of patient's allergies indicates:  No Known Allergies     REVIEW OF SYSTEMS:  Constitution: Negative. Negative for chills, fever and night sweats.    Hematologic/Lymphatic: Negative for bleeding problem. Does not bruise/bleed easily.   Skin: Negative for dry skin, itching and rash.   Musculoskeletal: Negative for falls. Positive for left shoulder pain and muscle weakness.     All other review of symptoms were reviewed and found to be noncontributory.    PHYSICAL EXAMINATION:  Vitals:  BP (!) 143/92   Pulse 92   Ht 6' 5" (1.956 m)   Wt 108.5 kg (239 lb 3.2 oz)   BMI 28.36 kg/m²    General: Well-developed well-nourished 48 y.o. malein no acute distress   Cardiovascular: Regular rhythm by palpation of distal pulse, normal color and temperature, no concerning varicosities on symptomatic side   Lungs: No labored breathing or wheezing appreciated   Neuro: Alert and oriented ×3   Psychiatric: well oriented to person, place and time, demonstrates normal mood and affect   Skin: No rashes, lesions or ulcers, normal temperature, turgor, and texture on " uninvolved extremity    Ortho/SPM Exam  Examination of the left shoulder demonstrates active forward elevation to 130, ER with arm at side to 25, IR to T10.  Limitation due to reported pain Passive FE to 170, ER to 60. Prominent tenderness along the proximal biceps tendon, posterior glenohumeral joint line.  Negative AC joint for pain. 4+/5 resisted supraspinatus testing - limitation due to pain. 5-/5 resisted infraspinatus testing. Positive modified speed's. Positive thayer atul. Negative belly press test.  Negative bear hug.  Positive lift-off for pain.  Stable shoulder.  No scapular winging or significant dyskinesis.  No midline neck tenderness. Negative Spurling's maneuver.     IMAGING:  Xrays including AP, Outlet and Axillary Lateral of Left shoulder are ordered / images reviewed by me:   Mild DJD.  No fracture or dislocation.  No bone destruction identified.  No abnormal soft tissue calcifications.    External MRI of Left shoulder reviewed by me and discussed with patient. Study shows:     On my read there does not appear to be a significant rotator cuff tear.  Predominant rotator cuff tendinopathy.  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    ASSESSMENT:      ICD-10-CM ICD-9-CM   1. Localized osteoarthrosis, shoulder region, left  M19.012 715.31   2. Biceps tendonitis on left  M75.22 726.12   3. Superior glenoid labrum lesion of left shoulder, initial encounter  S43.432A 840.7   4. Nontraumatic incomplete tear of left rotator cuff  M75.112 726.13       PLAN:     Findings discussed with the patient and his wife.  We discussed the options of targeted steroid injection with the additional therapy formally versus operative intervention.  This has been a longstanding issue for the patient and he has prominent mechanical symptoms which are bothersome.  Rather he would like to discuss arthroscopic intervention.  We  discussed the details of arthroscopic extensive debridement with planned open subpectoral biceps tenodesis, subacromial decompression and rotator cuff treatment as indicated to include debridement versus repair. The details of surgery were discussed include the expected postop rehab and recovery course.  Patient understands the inherent risk for postoperative stiffness and continued or recurrent pain related to his underlying chondral disease.  I do think surgery at this time is most reliable given his mechanical symptoms and desire to address these.  I think the biceps is a primary pain generator in addition to his underlying SLAP and chondral pathology.  Both he and his wife would like to proceed as soon as possible.  No preop medical clearance needed.    Plan is left shoulder arthroscopic extensive debridement, open subpectoral biceps tenodesis, subacromial decompression    Informed Consent:    The details of the surgical procedure were explained, including the location of probable incisions and a description of possible hardware and/or grafts to be used. Alternatives to both operative and non-operative options with associated risks and benefits were discussed. The patient understands the likely convalescence after surgery and, in particular, the expected postop rehab and recovery course. The outlined risks and potential complications of the proposed procedure include but are not limited to: infection, poor wound healing, scarring, deformity, stiffness, swelling, continued or recurrent pain, instability, hardware or prosthetic failure if implanted, symptomatic hardware requiring removal, dislocation, weakness, neurovascular injury, numbness, chronic regional pain disorder, tissue nonhealing/irreparability/retear, subsequent contralateral limb injury or pathology, chondral injury, arthritis, fracture, blood clot formation, inability to return to previous level of activity, anesthetic or regional block complication  up to death, need for additional procedure as indicated intraoperatively, and potential need for further surgery.    The patient was also informed and understands that the risks of surgery are greater for patients with a current condition or history of heart disease, obesity, clotting disorders, recurrent infections, steroid use, current or past smoking, and factors such as sedentary lifestyle and noncompliance with medications, therapy or follow-up. The degree of the increased risk is hard to estimate with any degree of precision. If applicable, smoking cessation was discussed.     All questions were answered. The patient has verbalized understanding of these issues and wishes to proceed with the surgery as discussed.      Procedures

## 2024-01-04 ENCOUNTER — OFFICE VISIT (OUTPATIENT)
Dept: SPORTS MEDICINE | Facility: CLINIC | Age: 49
End: 2024-01-04
Payer: COMMERCIAL

## 2024-01-04 VITALS
HEIGHT: 77 IN | BODY MASS INDEX: 27.85 KG/M2 | HEART RATE: 95 BPM | WEIGHT: 235.88 LBS | SYSTOLIC BLOOD PRESSURE: 131 MMHG | DIASTOLIC BLOOD PRESSURE: 89 MMHG

## 2024-01-04 DIAGNOSIS — S43.432D SUPERIOR GLENOID LABRUM LESION OF LEFT SHOULDER, SUBSEQUENT ENCOUNTER: ICD-10-CM

## 2024-01-04 DIAGNOSIS — S43.432D SUPERIOR GLENOID LABRUM LESION OF LEFT SHOULDER, SUBSEQUENT ENCOUNTER: Primary | ICD-10-CM

## 2024-01-04 DIAGNOSIS — M75.22 BICIPITAL TENDINITIS OF LEFT SHOULDER: Primary | ICD-10-CM

## 2024-01-04 DIAGNOSIS — M75.22 BICIPITAL TENDINITIS OF LEFT SHOULDER: ICD-10-CM

## 2024-01-04 PROCEDURE — 99999 PR PBB SHADOW E&M-EST. PATIENT-LVL III: CPT | Mod: PBBFAC,,, | Performed by: PHYSICIAN ASSISTANT

## 2024-01-04 PROCEDURE — 99499 UNLISTED E&M SERVICE: CPT | Mod: S$GLB,,, | Performed by: PHYSICIAN ASSISTANT

## 2024-01-04 RX ORDER — SODIUM CHLORIDE 9 MG/ML
INJECTION, SOLUTION INTRAVENOUS CONTINUOUS
Status: CANCELLED | OUTPATIENT
Start: 2024-01-04

## 2024-01-04 RX ORDER — CEFAZOLIN SODIUM 2 G/50ML
2 SOLUTION INTRAVENOUS
Status: CANCELLED | OUTPATIENT
Start: 2024-01-04

## 2024-01-04 RX ORDER — OXYCODONE HYDROCHLORIDE 5 MG/1
5 TABLET ORAL EVERY 6 HOURS PRN
Qty: 28 TABLET | Refills: 0 | Status: SHIPPED | OUTPATIENT
Start: 2024-01-04 | End: 2024-01-16 | Stop reason: SDUPTHER

## 2024-01-04 RX ORDER — GUAIFENESIN 100 MG/5ML
81 LIQUID (ML) ORAL 2 TIMES DAILY
Qty: 28 TABLET | Refills: 0 | Status: SHIPPED | OUTPATIENT
Start: 2024-01-04 | End: 2024-01-30

## 2024-01-04 RX ORDER — ONDANSETRON 4 MG/1
4 TABLET, FILM COATED ORAL EVERY 8 HOURS PRN
Qty: 20 TABLET | Refills: 0 | Status: SHIPPED | OUTPATIENT
Start: 2024-01-04

## 2024-01-04 NOTE — H&P
Farzad Moore  is here for a completion of his perioperative paperwork. he  Is scheduled to undergo:    left shoulder arthroscopic extensive debridement, open subpectoral biceps tenodesis, subacromial decompression      on 1/10/2024.  He is a healthy individual and does not need clearance for this procedure.     Risks, indications and benefits of the surgical procedure were discussed with the patient. All questions with regard to surgery, rehab, expected return to functional activities, activities of daily living and recreational endeavors were answered to his satisfaction.    Patient was informed and understands the risks of surgery are greater for patients with a current condition or hx of heart disease, obesity, clotting disorders, recurrent infections, steroid use, current or past smoking, and factors such as sedentary lifestyle and noncompliance with medications, therapy or f/u. The degree of the increased risk is hard to estimate w/ any degree of precision.    Once no other questions were asked, a brief history and physical exam was then performed.    PAST MEDICAL HISTORY:   Past Medical History:   Diagnosis Date    ADHD     Right inguinal hernia 12/14/2018     PAST SURGICAL HISTORY:   Past Surgical History:   Procedure Laterality Date    ESOPHAGOGASTRODUODENOSCOPY N/A 11/11/2022    Procedure: EGD (ESOPHAGOGASTRODUODENOSCOPY);  Surgeon: Baylee Flores MD;  Location: [facility];  Service: Endoscopy;  Laterality: N/A;    HERNIA REPAIR      KNEE SURGERY       FAMILY HISTORY:   Family History   Problem Relation Age of Onset    Diabetes Mother     Heart attack Father 38    Lung cancer Father     Heart attack Paternal Uncle 50    Colon cancer Neg Hx     Crohn's disease Neg Hx     Ulcerative colitis Neg Hx     Prostate cancer Neg Hx      SOCIAL HISTORY:   Social History     Socioeconomic History    Marital status:     Number of children: 1   Occupational History    Occupation: printing business    Tobacco Use    Smoking status: Never    Smokeless tobacco: Never   Substance and Sexual Activity    Alcohol use: No    Drug use: Not Currently    Sexual activity: Yes     Partners: Female   Social History Narrative    Patient is originally from [place] , [place] [facility]         School at ; [employer]         [employer]         Working ; printing business        Fiance         Children    5 son - Farzad III        Lives with     [place]  And 6 y/o son             Diet/Exericse;    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 Jonny Toledo IV, MD    Address: [place] [address]    Phone: [phone]    Psychiatry              Social Determinants of Health     Financial Resource Strain: High Risk (1/2/2024)    Overall Financial Resource Strain (CARDIA)     Difficulty of Paying Living Expenses: Hard   Food Insecurity: No Food Insecurity (1/2/2024)    Hunger Vital Sign     Worried About Running Out of Food in the Last Year: Never true     Ran Out of Food in the Last Year: Never true   Transportation Needs: No Transportation Needs (1/2/2024)    PRAPARE - Transportation     Lack of Transportation (Medical): No     Lack of Transportation (Non-Medical): No   Physical Activity: Inactive (1/2/2024)    Exercise Vital Sign     Days of Exercise per Week: 0 days     Minutes of Exercise per Session: 0 min   Stress: Stress Concern Present (1/2/2024)    [employer] - Occupational Stress Questionnaire     Feeling of Stress : To some extent   Social Connections: Unknown (1/2/2024)    Social Connection and Isolation Panel [NHANES]     Frequency of Communication with Friends and Family: Three times a week     Frequency of Social Gatherings with Friends and Family: Once a week     Active Member of Clubs or Organizations: No     Attends Club or Organization Meetings: Never     Marital Status:    Housing Stability: Low Risk  (1/2/2024)    Housing  Stability Vital Sign     Unable to Pay for Housing in the Last Year: No     Number of Places Lived in the Last Year: 1     Unstable Housing in the Last Year: No       MEDICATIONS:   Current Outpatient Medications:     clonazePAM (KLONOPIN) 1 MG tablet, Take 1 mg by mouth every evening., Disp: , Rfl:     dextroamphetamine-amphetamine (ADDERALL XR) 30 MG 24 hr capsule, Take 30 mg by mouth every morning., Disp: , Rfl:     fluticasone propionate (FLONASE) 50 mcg/actuation nasal spray, 1 spray by Each Nostril route once daily., Disp: , Rfl:     aspirin 81 MG Chew, Take 1 tablet (81 mg total) by mouth 2 (two) times a day. for 14 days, Disp: 28 tablet, Rfl: 0    cetirizine (ZYRTEC) 10 MG tablet, Take 10 mg by mouth daily as needed., Disp: , Rfl:     ibuprofen (ADVIL,MOTRIN) 800 MG tablet, Take 800 mg by mouth every 8 (eight) hours as needed for Pain., Disp: , Rfl:     omeprazole (PRILOSEC) 40 MG capsule, Take 1 capsule (40 mg total) by mouth once daily., Disp: 30 capsule, Rfl: 5    ondansetron (ZOFRAN) 4 MG tablet, Take 1 tablet (4 mg total) by mouth every 8 (eight) hours as needed for Nausea., Disp: 20 tablet, Rfl: 0    oxyCODONE (ROXICODONE) 5 MG immediate release tablet, Take 1 tablet (5 mg total) by mouth every 6 (six) hours as needed for Pain., Disp: 28 tablet, Rfl: 0  ALLERGIES: Review of patient's allergies indicates:  No Known Allergies    Review of Systems   Constitution: Negative. Negative for chills, fever and night sweats.   HENT: Negative for congestion and headaches.    Eyes: Negative for blurred vision, left vision loss and right vision loss.   Cardiovascular: Negative for chest pain and syncope.   Respiratory: Negative for cough and shortness of breath.    Endocrine: Negative for polydipsia, polyphagia and polyuria.   Hematologic/Lymphatic: Negative for bleeding problem. Does not bruise/bleed easily.   Skin: Negative for dry skin, itching and rash.   Musculoskeletal: Negative for falls and muscle weakness.    Gastrointestinal: Negative for abdominal pain and bowel incontinence.   Genitourinary: Negative for bladder incontinence and nocturia.   Neurological: Negative for disturbances in coordination, loss of balance and seizures.   Psychiatric/Behavioral: Negative for depression. The patient does not have insomnia.    Allergic/Immunologic: Negative for hives and persistent infections.     PHYSICAL EXAM:  GEN: A&Ox3, WD WN NAD  HEENT: WNL  CHEST: CTAB, no W/R/R  HEART: RRR, no M/R/G   ABD: Soft, NT ND, BS x4 QUADS  MS: Refer to previous note for detailed [place] exam  NEURO: CN II-XII intact       The surgical consent was then reviewed with the patient, who agreed with all the contents of the consent form and it was signed.     PHYSICAL THERAPY:  He was also instructed regarding physical therapy and will begin on POD#1-3. He is doing physical therapy at [facility].    POST OP CARE: Instructions were reviewed including care of the wound and dressing after surgery and when he can shower.     PAIN MANAGEMENT: Farzad Moore was instructed regarding the Polar ice unit that will be in place after surgery and his postoperative pain medications.     MEDICATION:  Roxicodone 5 mg 1-2 q 4 hours PRN for pain  Zofran 4 mg q 8 hours PRN for nausea and vomiting.  Aspirin 81mg BID x 2 weeks for DVT prophylaxis starting on the evening after surgery.      Post op meds to be delivered bedside prior to discharge. Deliver to family if patient is in surgery at 5pm.     Patient was instructed to purchase and take Colace to counter possible GI side effects of taking opiates.     DVT prophylaxis was discussed with the patient today including risk factors for developing DVTs and history of DVTs. The patient was asked if any specific recommendations were given from the doctor/s that did pre-operative surgical clearance.      If the patient was previously taking 81mg baby aspirin, they were told to not take additional  baby aspirin, using the above stated aspirin and to restart the 81mg aspirin daily after completion of the aspirin dose.      Patient was also told to buy over the counter Prilosec medication and take it once daily for GI protection as long as they are taking NSAIDs or Aspirin.     The patient was told that narcotic pain medications may make them drowsy and instructions were given to not sign legal documents, drive or operate heavy machinery, cars, or equipment while under the influence of narcotic medications.     Dr. Farris was present in clinic during this pre-op evaluation. The patient was offered the opportunity to ask Dr. Farris any further questions regarding the procedure which may not have been addressed during their previous informed consent discussion. The patient has declined to see Dr. Farris today.    As there were no other questions to be asked, he was given my business card along with Dr. Farris's business card if he has any questions or concerns prior to surgery or in the postop period.

## 2024-01-04 NOTE — H&P (VIEW-ONLY)
Farzad Moore  is here for a completion of his perioperative paperwork. he  Is scheduled to undergo:    left shoulder arthroscopic extensive debridement, open subpectoral biceps tenodesis, subacromial decompression      on 1/10/2024.  He is a healthy individual and does not need clearance for this procedure.     Risks, indications and benefits of the surgical procedure were discussed with the patient. All questions with regard to surgery, rehab, expected return to functional activities, activities of daily living and recreational endeavors were answered to his satisfaction.    Patient was informed and understands the risks of surgery are greater for patients with a current condition or hx of heart disease, obesity, clotting disorders, recurrent infections, steroid use, current or past smoking, and factors such as sedentary lifestyle and noncompliance with medications, therapy or f/u. The degree of the increased risk is hard to estimate w/ any degree of precision.    Once no other questions were asked, a brief history and physical exam was then performed.    PAST MEDICAL HISTORY:   Past Medical History:   Diagnosis Date    ADHD     Right inguinal hernia 12/14/2018     PAST SURGICAL HISTORY:   Past Surgical History:   Procedure Laterality Date    ESOPHAGOGASTRODUODENOSCOPY N/A 11/11/2022    Procedure: EGD (ESOPHAGOGASTRODUODENOSCOPY);  Surgeon: Baylee Flores MD;  Location: [facility];  Service: Endoscopy;  Laterality: N/A;    HERNIA REPAIR      KNEE SURGERY       FAMILY HISTORY:   Family History   Problem Relation Age of Onset    Diabetes Mother     Heart attack Father 38    Lung cancer Father     Heart attack Paternal Uncle 50    Colon cancer Neg Hx     Crohn's disease Neg Hx     Ulcerative colitis Neg Hx     Prostate cancer Neg Hx      SOCIAL HISTORY:   Social History     Socioeconomic History    Marital status:     Number of children: 1   Occupational History    Occupation: printing business    Tobacco Use    Smoking status: Never    Smokeless tobacco: Never   Substance and Sexual Activity    Alcohol use: No    Drug use: Not Currently    Sexual activity: Yes     Partners: Female   Social History Narrative    Patient is originally from [place] , [place] [facility]         School at ; [employer]         [employer] [place] [employer]         Working ; printing business        Fiance         Children    5 son - Farzad III        Lives with     [place]  And 6 y/o son             Diet/Exericse;    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 Jonny Toledo IV, MD    [facility] [place] [address]    Phone: [phone]    Psychiatry              Social Determinants of Health     Financial Resource Strain: High Risk (1/2/2024)    Overall Financial Resource Strain (CARDIA)     Difficulty of Paying Living Expenses: Hard   Food Insecurity: No Food Insecurity (1/2/2024)    Hunger Vital Sign     Worried About Running Out of Food in the Last Year: Never true     Ran Out of Food in the Last Year: Never true   Transportation Needs: No Transportation Needs (1/2/2024)    PRAPARE - Transportation     Lack of Transportation (Medical): No     Lack of Transportation (Non-Medical): No   Physical Activity: Inactive (1/2/2024)    Exercise Vital Sign     Days of Exercise per Week: 0 days     Minutes of Exercise per Session: 0 min   Stress: Stress Concern Present (1/2/2024)    [place] [employer] - Occupational Stress Questionnaire     Feeling of Stress : To some extent   Social Connections: Unknown (1/2/2024)    Social Connection and Isolation Panel [NHANES]     Frequency of Communication with Friends and Family: Three times a week     Frequency of Social Gatherings with Friends and Family: Once a week     Active Member of Clubs or Organizations: No     Attends Club or Organization Meetings: Never     Marital Status:    Housing Stability: Low Risk  (1/2/2024)    Housing  Stability Vital Sign     Unable to Pay for Housing in the Last Year: No     Number of Places Lived in the Last Year: 1     Unstable Housing in the Last Year: No       MEDICATIONS:   Current Outpatient Medications:     clonazePAM (KLONOPIN) 1 MG tablet, Take 1 mg by mouth every evening., Disp: , Rfl:     dextroamphetamine-amphetamine (ADDERALL XR) 30 MG 24 hr capsule, Take 30 mg by mouth every morning., Disp: , Rfl:     fluticasone propionate (FLONASE) 50 mcg/actuation nasal spray, 1 spray by Each Nostril route once daily., Disp: , Rfl:     aspirin 81 MG Chew, Take 1 tablet (81 mg total) by mouth 2 (two) times a day. for 14 days, Disp: 28 tablet, Rfl: 0    cetirizine (ZYRTEC) 10 MG tablet, Take 10 mg by mouth daily as needed., Disp: , Rfl:     ibuprofen (ADVIL,MOTRIN) 800 MG tablet, Take 800 mg by mouth every 8 (eight) hours as needed for Pain., Disp: , Rfl:     omeprazole (PRILOSEC) 40 MG capsule, Take 1 capsule (40 mg total) by mouth once daily., Disp: 30 capsule, Rfl: 5    ondansetron (ZOFRAN) 4 MG tablet, Take 1 tablet (4 mg total) by mouth every 8 (eight) hours as needed for Nausea., Disp: 20 tablet, Rfl: 0    oxyCODONE (ROXICODONE) 5 MG immediate release tablet, Take 1 tablet (5 mg total) by mouth every 6 (six) hours as needed for Pain., Disp: 28 tablet, Rfl: 0  ALLERGIES: Review of patient's allergies indicates:  No Known Allergies    Review of Systems   Constitution: Negative. Negative for chills, fever and night sweats.   HENT: Negative for congestion and headaches.    Eyes: Negative for blurred vision, left vision loss and right vision loss.   Cardiovascular: Negative for chest pain and syncope.   Respiratory: Negative for cough and shortness of breath.    Endocrine: Negative for polydipsia, polyphagia and polyuria.   Hematologic/Lymphatic: Negative for bleeding problem. Does not bruise/bleed easily.   Skin: Negative for dry skin, itching and rash.   Musculoskeletal: Negative for falls and muscle weakness.    Gastrointestinal: Negative for abdominal pain and bowel incontinence.   Genitourinary: Negative for bladder incontinence and nocturia.   Neurological: Negative for disturbances in coordination, loss of balance and seizures.   Psychiatric/Behavioral: Negative for depression. The patient does not have insomnia.    Allergic/Immunologic: Negative for hives and persistent infections.     PHYSICAL EXAM:  GEN: A&Ox3, WD WN NAD  HEENT: WNL  CHEST: CTAB, no W/R/R  HEART: RRR, no M/R/G   ABD: Soft, NT ND, BS x4 QUADS  MS: Refer to previous note for detailed [place] exam  NEURO: CN II-XII intact       The surgical consent was then reviewed with the patient, who agreed with all the contents of the consent form and it was signed.     PHYSICAL THERAPY:  He was also instructed regarding physical therapy and will begin on POD#1-3. He is doing physical therapy at [facility].    POST OP CARE: Instructions were reviewed including care of the wound and dressing after surgery and when he can shower.     PAIN MANAGEMENT: Farzad Moore was instructed regarding the Polar ice unit that will be in place after surgery and his postoperative pain medications.     MEDICATION:  Roxicodone 5 mg 1-2 q 4 hours PRN for pain  Zofran 4 mg q 8 hours PRN for nausea and vomiting.  Aspirin 81mg BID x 2 weeks for DVT prophylaxis starting on the evening after surgery.      Post op meds to be delivered bedside prior to discharge. Deliver to family if patient is in surgery at 5pm.     Patient was instructed to purchase and take Colace to counter possible GI side effects of taking opiates.     DVT prophylaxis was discussed with the patient today including risk factors for developing DVTs and history of DVTs. The patient was asked if any specific recommendations were given from the doctor/s that did pre-operative surgical clearance.      If the patient was previously taking 81mg baby aspirin, they were told to not take additional  baby aspirin, using the above stated aspirin and to restart the 81mg aspirin daily after completion of the aspirin dose.      Patient was also told to buy over the counter Prilosec medication and take it once daily for GI protection as long as they are taking NSAIDs or Aspirin.     The patient was told that narcotic pain medications may make them drowsy and instructions were given to not sign legal documents, drive or operate heavy machinery, cars, or equipment while under the influence of narcotic medications.     Dr. Farris was present in clinic during this pre-op evaluation. The patient was offered the opportunity to ask Dr. Farris any further questions regarding the procedure which may not have been addressed during their previous informed consent discussion. The patient has declined to see Dr. Farris today.    As there were no other questions to be asked, he was given my business card along with Dr. Farris's business card if he has any questions or concerns prior to surgery or in the postop period.

## 2024-01-05 ENCOUNTER — TELEPHONE (OUTPATIENT)
Dept: SPORTS MEDICINE | Facility: CLINIC | Age: 49
End: 2024-01-05
Payer: COMMERCIAL

## 2024-01-05 ENCOUNTER — PATIENT MESSAGE (OUTPATIENT)
Dept: PREADMISSION TESTING | Facility: HOSPITAL | Age: 49
End: 2024-01-05
Payer: COMMERCIAL

## 2024-01-05 RX ORDER — IBUPROFEN 800 MG/1
1 TABLET ORAL 3 TIMES DAILY
COMMUNITY
End: 2024-02-08

## 2024-01-05 NOTE — TELEPHONE ENCOUNTER
----- Message from Linda West sent at 1/5/2024  2:58 PM CST -----  Regarding: Advise  Contact: [phone]  Farzad Moore calling regarding Patient Advice (message) for #returning a missed call from Dr. Farris's office regarding surgery. Pt's wife wants to know why the office is trying to cancel appt because he was approved for the financial assistance. Pt's wife would like a call back.

## 2024-01-05 NOTE — ANESTHESIA PAT ROS NOTE
01/05/2024  Farzad Moore is a 48 y.o., male.      Pre-op Assessment    I have reviewed the Patient Summary Reports.       I have reviewed the Medications.     Review of Systems  Anesthesia Hx:  No problems with previous Anesthesia   History of prior surgery of interest to airway management or planning:  Previous anesthesia: General, MAC 1/7/2019  Right Inguinal Hernia Repair with general anesthesia.  Procedure performed at an [facility].      11/11/2022  EGD with MAC.  Procedure performed at an [facility]. Airway issues documented on chart review include GETA, easy direct laryngoscopy , view on direct laryngoscopy Grade I      Denies Personal Hx of Anesthesia complications.                    Social:  Non-Smoker, No Alcohol Use       Hematology/Oncology:  Hematology Normal   Oncology Normal                                   EENT/Dental:  chronic allergic rhinitis Recurrent sinus infections          Cardiovascular:  Cardiovascular Normal Exercise tolerance: good       Denies CAD.       Denies Angina.       Denies FLORES.  ECG has been reviewed.                          Pulmonary:  Pulmonary Normal    Denies Asthma.   Denies Shortness of breath.                  Renal/:  Renal/ Normal  Denies Chronic Renal Disease.                Hepatic/GI:     GERD, well controlled Denies Liver Disease.  H/O right inguinal hernia, S/P Repair,  Esophageal dysphagia          Musculoskeletal:  Arthritis   SLAP lesion of left shoulder,  Osteoarthritis of glenohumeral joint, left,  Bicipital tendonitis of shoulder,  Tear of rotator cuff,  H/O knee surgery              Neurological:  Neurology Normal   Denies CVA.    Denies Headaches. Denies Seizures.                                Endocrine:  Endocrine Normal Denies Diabetes. Denies Hypothyroidism.          Psych:     ADHD              Past Medical History:    Diagnosis Date    ADHD     Right inguinal hernia 12/14/2018     Past Surgical History:   Procedure Laterality Date    ESOPHAGOGASTRODUODENOSCOPY N/A 11/11/2022    Procedure: EGD (ESOPHAGOGASTRODUODENOSCOPY);  Surgeon: Baylee Flores MD;  Location: [facility];  Service: Endoscopy;  Laterality: N/A;    HERNIA REPAIR      KNEE SURGERY         Anesthesia Assessment: Preoperative EQUATION    Planned Procedure: Procedure(s) (LRB):  DEBRIDEMENT, SHOULDER, ARTHROSCOPIC- POLAR CARE (Left)  ARTHROSCOPY,SHOULDER,WITH BICEPS TENODESIS (Left)  Requested Anesthesia Type:General/Regional  Surgeon: CLOTILDE Farris MD  Service: Orthopedics  Known or anticipated Date of Surgery:1/10/2024    Surgeon notes: reviewed    Electronic QUestionnaire Assessment completed via nurse interview with patient.        Triage considerations:     The patient has no apparent active cardiac condition (No unstable coronary Syndrome such as severe unstable angina or recent [<1 month] myocardial infarction, decompensated CHF, severe valvular   disease or significant arrhythmia)    Previous anesthesia records:GETA, MAC, Easy intubation, and No problems    Last PCP note: 6-12 months ago , within [facility]   Subspecialty notes: n/a    Other important co-morbidities: GERD       EKG 5/21/2017:  Vent. Rate : 080 BPM     Atrial Rate : 080 BPM      P-R Int : 142 ms          QRS Dur : 078 ms       QT Int : 350 ms       P-R-T Axes : 050 079 045 degrees      QTc Int : 403 ms   Normal sinus rhythm   Normal ECG   No previous ECGs available   Confirmed by Darrel BARBER MD ([id number]) on 5/22/2017 11:19:21 AM        Tests already available:  Results have been reviewed.               Instructions given. (See in Nurse's note)    Optimization:  Medical clearance for surgery is not required.       Plan:   Patient is OK to proceed with surgery at [facility].        Navigation:  Straight Line to surgery.               No tests, anesthesia preop clinic visit, or consult  required.      Ht: 6'5  Wt: 235 lb  BMI: 27.97  Vaccinated

## 2024-01-05 NOTE — TELEPHONE ENCOUNTER
Spoke c pt's wife. She confirmed that pt has been approved for [facility] financial assistance for 90 days beginning 01/04/24. Advised I will notifiy [facility] rep who reached out to our office yesterday c this info. Pt/pt's wife will call c additional questions/concerns in interim. Pt's wife expressed understanding & was thankful.

## 2024-01-08 ENCOUNTER — TELEPHONE (OUTPATIENT)
Dept: SPORTS MEDICINE | Facility: CLINIC | Age: 49
End: 2024-01-08
Payer: COMMERCIAL

## 2024-01-08 NOTE — TELEPHONE ENCOUNTER
case denied  Received: Today  Faina Olvera RN  P [facility] Staff  Good afternoon,,    Case denied  Due to not have 2 types of treatment for 12 weeks    Peer to peer  [phone]  Case# [id number]        Please advise    Thank you,  Faina olvera  [phone]

## 2024-01-08 NOTE — TELEPHONE ENCOUNTER
Spoke Ayush Gregory [phone]. Transferred to .     Spoke Ayush Perea peer to peer . Peer to peer scheduled at 0815 01/09/24 c Dr. Grace, [facility] area code. Dr. Farris's fellow, Dr. Caleb Billings will complete peer to peer & his cell # was provided.

## 2024-01-09 ENCOUNTER — ANESTHESIA EVENT (OUTPATIENT)
Dept: SURGERY | Facility: HOSPITAL | Age: 49
End: 2024-01-09
Payer: COMMERCIAL

## 2024-01-09 ENCOUNTER — TELEPHONE (OUTPATIENT)
Dept: SPORTS MEDICINE | Facility: CLINIC | Age: 49
End: 2024-01-09
Payer: COMMERCIAL

## 2024-01-09 NOTE — TELEPHONE ENCOUNTER
----- Message from Claudia Harley sent at 1/9/2024  8:20 AM CST -----  Regarding: Peer to Peer  Contact: Lissett @ [phone]  Lissett with the peer to peer is calling in because she said it was lauryn for 8:15 am. Lissett stated that an Ana Lilia set up the peer to peer. Lissett state that the peer to peer would need to be rescheduled due to missing the 8:15 appt time. Please call to advise

## 2024-01-09 NOTE — TELEPHONE ENCOUNTER
----- Message from Linda West sent at 1/9/2024  2:36 PM CST -----  Regarding: Procedure Details  Contact: [phone]  Pt is calling to get the arrival time and details of his procedure schedule tomorrow 1/10/2024. Please give pt a call back with this information.

## 2024-01-09 NOTE — TELEPHONE ENCOUNTER
Dr. Billings completed peer to peer.     Authorization #: [id number]  Valid Dates: 01/09/24-04/08/24    Pre-service team, Faina Aleman, notified via inbasket & Teams.

## 2024-01-10 ENCOUNTER — HOSPITAL ENCOUNTER (OUTPATIENT)
Facility: HOSPITAL | Age: 49
Discharge: HOME | End: 2024-01-10
Attending: ORTHOPAEDIC SURGERY | Admitting: ORTHOPAEDIC SURGERY
Payer: COMMERCIAL

## 2024-01-10 ENCOUNTER — ANESTHESIA (OUTPATIENT)
Dept: SURGERY | Facility: HOSPITAL | Age: 49
End: 2024-01-10
Payer: COMMERCIAL

## 2024-01-10 VITALS
TEMPERATURE: 98 F | SYSTOLIC BLOOD PRESSURE: 133 MMHG | HEART RATE: 82 BPM | WEIGHT: 235 LBS | RESPIRATION RATE: 17 BRPM | OXYGEN SATURATION: 95 % | BODY MASS INDEX: 27.75 KG/M2 | HEIGHT: 77 IN | DIASTOLIC BLOOD PRESSURE: 71 MMHG

## 2024-01-10 DIAGNOSIS — M75.22 BICIPITAL TENDINITIS OF LEFT SHOULDER: ICD-10-CM

## 2024-01-10 DIAGNOSIS — S43.432D SUPERIOR GLENOID LABRUM LESION OF LEFT SHOULDER, SUBSEQUENT ENCOUNTER: ICD-10-CM

## 2024-01-10 PROCEDURE — 29827 SHO ARTHRS SRG RT8TR CUF RPR: CPT | Mod: LT,,, | Performed by: ORTHOPAEDIC SURGERY

## 2024-01-10 PROCEDURE — 37000009 HC ANESTHESIA EA ADD 15 MINS: Performed by: ORTHOPAEDIC SURGERY

## 2024-01-10 PROCEDURE — 25000003 PHARM REV CODE 250: Performed by: PHYSICIAN ASSISTANT

## 2024-01-10 PROCEDURE — C1713 ANCHOR/SCREW BN/BN,TIS/BN: HCPCS | Performed by: ORTHOPAEDIC SURGERY

## 2024-01-10 PROCEDURE — 36000711: Performed by: ORTHOPAEDIC SURGERY

## 2024-01-10 PROCEDURE — 29823 SHO ARTHRS SRG XTNSV DBRDMT: CPT | Mod: 51,LT,, | Performed by: ORTHOPAEDIC SURGERY

## 2024-01-10 PROCEDURE — 63600175 PHARM REV CODE 636 W HCPCS: Performed by: PHYSICIAN ASSISTANT

## 2024-01-10 PROCEDURE — 64999 UNLISTED PX NERVOUS SYSTEM: CPT | Mod: ,,, | Performed by: ANESTHESIOLOGY

## 2024-01-10 PROCEDURE — 37000008 HC ANESTHESIA 1ST 15 MINUTES: Performed by: ORTHOPAEDIC SURGERY

## 2024-01-10 PROCEDURE — 99900035 HC TECH TIME PER 15 MIN (STAT)

## 2024-01-10 PROCEDURE — 71000033 HC RECOVERY, INTIAL HOUR: Performed by: ORTHOPAEDIC SURGERY

## 2024-01-10 PROCEDURE — 63600175 PHARM REV CODE 636 W HCPCS: Mod: JG,JZ | Performed by: ANESTHESIOLOGY

## 2024-01-10 PROCEDURE — 64415 NJX AA&/STRD BRCH PLXS IMG: CPT | Performed by: ANESTHESIOLOGY

## 2024-01-10 PROCEDURE — D9220A PRA ANESTHESIA: Mod: ANES,,, | Performed by: ANESTHESIOLOGY

## 2024-01-10 PROCEDURE — 23430 REPAIR BICEPS TENDON: CPT | Mod: 51,LT,, | Performed by: ORTHOPAEDIC SURGERY

## 2024-01-10 PROCEDURE — 25000003 PHARM REV CODE 250: Performed by: ANESTHESIOLOGY

## 2024-01-10 PROCEDURE — 29823 SHO ARTHRS SRG XTNSV DBRDMT: CPT | Mod: 82,LT,, | Performed by: STUDENT IN AN ORGANIZED HEALTH CARE EDUCATION/TRAINING PROGRAM

## 2024-01-10 PROCEDURE — 63600175 PHARM REV CODE 636 W HCPCS: Performed by: ORTHOPAEDIC SURGERY

## 2024-01-10 PROCEDURE — 25000003 PHARM REV CODE 250: Performed by: NURSE ANESTHETIST, CERTIFIED REGISTERED

## 2024-01-10 PROCEDURE — 63600175 PHARM REV CODE 636 W HCPCS: Performed by: ANESTHESIOLOGY

## 2024-01-10 PROCEDURE — 94761 N-INVAS EAR/PLS OXIMETRY MLT: CPT

## 2024-01-10 PROCEDURE — 27201423 OPTIME MED/SURG SUP & DEVICES STERILE SUPPLY: Performed by: ORTHOPAEDIC SURGERY

## 2024-01-10 PROCEDURE — 63600175 PHARM REV CODE 636 W HCPCS: Performed by: NURSE ANESTHETIST, CERTIFIED REGISTERED

## 2024-01-10 PROCEDURE — 36000710: Performed by: ORTHOPAEDIC SURGERY

## 2024-01-10 PROCEDURE — D9220A PRA ANESTHESIA: Mod: CRNA,,, | Performed by: NURSE ANESTHETIST, CERTIFIED REGISTERED

## 2024-01-10 PROCEDURE — 76942 ECHO GUIDE FOR BIOPSY: CPT | Performed by: ANESTHESIOLOGY

## 2024-01-10 PROCEDURE — 71000015 HC POSTOP RECOV 1ST HR: Performed by: ORTHOPAEDIC SURGERY

## 2024-01-10 DEVICE — ANCHOR CORKSCREW FT 4.75X14MM: Type: IMPLANTABLE DEVICE | Site: SHOULDER | Status: FUNCTIONAL

## 2024-01-10 DEVICE — ANCHOR SWIVELOCK KL 5.5X24.5MM: Type: IMPLANTABLE DEVICE | Site: SHOULDER | Status: FUNCTIONAL

## 2024-01-10 DEVICE — IMPLANTABLE DEVICE: Type: IMPLANTABLE DEVICE | Site: SHOULDER | Status: FUNCTIONAL

## 2024-01-10 RX ORDER — FAMOTIDINE 10 MG/ML
INJECTION INTRAVENOUS
Status: DISCONTINUED | OUTPATIENT
Start: 2024-01-10 | End: 2024-01-10

## 2024-01-10 RX ORDER — DIPHENHYDRAMINE HYDROCHLORIDE 50 MG/ML
INJECTION, SOLUTION INTRAMUSCULAR; INTRAVENOUS
Status: DISCONTINUED | OUTPATIENT
Start: 2024-01-10 | End: 2024-01-10

## 2024-01-10 RX ORDER — BUPIVACAINE HYDROCHLORIDE 2.5 MG/ML
INJECTION, SOLUTION EPIDURAL; INFILTRATION; INTRACAUDAL
Status: COMPLETED | OUTPATIENT
Start: 2024-01-10 | End: 2024-01-10

## 2024-01-10 RX ORDER — BUPIVACAINE HYDROCHLORIDE 5 MG/ML
INJECTION, SOLUTION EPIDURAL; INTRACAUDAL
Status: COMPLETED | OUTPATIENT
Start: 2024-01-10 | End: 2024-01-10

## 2024-01-10 RX ORDER — ACETAMINOPHEN 500 MG
1000 TABLET ORAL ONCE
Status: COMPLETED | OUTPATIENT
Start: 2024-01-10 | End: 2024-01-10

## 2024-01-10 RX ORDER — FENTANYL CITRATE 50 UG/ML
25 INJECTION, SOLUTION INTRAMUSCULAR; INTRAVENOUS EVERY 5 MIN PRN
Status: DISCONTINUED | OUTPATIENT
Start: 2024-01-10 | End: 2024-01-10 | Stop reason: HOSPADM

## 2024-01-10 RX ORDER — ROPIVACAINE HYDROCHLORIDE 2 MG/ML
INJECTION, SOLUTION EPIDURAL; INFILTRATION; PERINEURAL CONTINUOUS
Status: DISCONTINUED | OUTPATIENT
Start: 2024-01-10 | End: 2024-01-10 | Stop reason: HOSPADM

## 2024-01-10 RX ORDER — BUPIVACAINE HYDROCHLORIDE 5 MG/ML
INJECTION, SOLUTION EPIDURAL; INTRACAUDAL
Status: DISCONTINUED | OUTPATIENT
Start: 2024-01-10 | End: 2024-01-10 | Stop reason: HOSPADM

## 2024-01-10 RX ORDER — METHOCARBAMOL 500 MG/1
1000 TABLET, FILM COATED ORAL ONCE
Status: COMPLETED | OUTPATIENT
Start: 2024-01-10 | End: 2024-01-10

## 2024-01-10 RX ORDER — OXYCODONE HYDROCHLORIDE 5 MG/1
5 TABLET ORAL
Status: DISCONTINUED | OUTPATIENT
Start: 2024-01-10 | End: 2024-01-10 | Stop reason: HOSPADM

## 2024-01-10 RX ORDER — EPINEPHRINE 1 MG/ML
INJECTION, SOLUTION, CONCENTRATE INTRAVENOUS
Status: DISCONTINUED | OUTPATIENT
Start: 2024-01-10 | End: 2024-01-10 | Stop reason: HOSPADM

## 2024-01-10 RX ORDER — DROPERIDOL 2.5 MG/ML
INJECTION, SOLUTION INTRAMUSCULAR; INTRAVENOUS
Status: COMPLETED
Start: 2024-01-10 | End: 2024-01-10

## 2024-01-10 RX ORDER — MIDAZOLAM HYDROCHLORIDE 1 MG/ML
INJECTION, SOLUTION INTRAMUSCULAR; INTRAVENOUS
Status: DISCONTINUED | OUTPATIENT
Start: 2024-01-10 | End: 2024-01-10

## 2024-01-10 RX ORDER — KETAMINE HCL IN 0.9 % NACL 50 MG/5 ML
SYRINGE (ML) INTRAVENOUS
Status: DISCONTINUED | OUTPATIENT
Start: 2024-01-10 | End: 2024-01-10

## 2024-01-10 RX ORDER — FENTANYL CITRATE 50 UG/ML
25-200 INJECTION, SOLUTION INTRAMUSCULAR; INTRAVENOUS
Status: DISCONTINUED | OUTPATIENT
Start: 2024-01-10 | End: 2024-01-10 | Stop reason: HOSPADM

## 2024-01-10 RX ORDER — DROPERIDOL 2.5 MG/ML
INJECTION, SOLUTION INTRAMUSCULAR; INTRAVENOUS
Status: DISCONTINUED | OUTPATIENT
Start: 2024-01-10 | End: 2024-01-10

## 2024-01-10 RX ORDER — LIDOCAINE HYDROCHLORIDE 20 MG/ML
INJECTION INTRAVENOUS
Status: DISCONTINUED | OUTPATIENT
Start: 2024-01-10 | End: 2024-01-10

## 2024-01-10 RX ORDER — CELECOXIB 200 MG/1
200 CAPSULE ORAL ONCE
Status: COMPLETED | OUTPATIENT
Start: 2024-01-10 | End: 2024-01-10

## 2024-01-10 RX ORDER — DEXAMETHASONE SODIUM PHOSPHATE 4 MG/ML
INJECTION, SOLUTION INTRA-ARTICULAR; INTRALESIONAL; INTRAMUSCULAR; INTRAVENOUS; SOFT TISSUE
Status: DISCONTINUED | OUTPATIENT
Start: 2024-01-10 | End: 2024-01-10

## 2024-01-10 RX ORDER — SODIUM CHLORIDE 9 MG/ML
INJECTION, SOLUTION INTRAVENOUS CONTINUOUS
Status: DISCONTINUED | OUTPATIENT
Start: 2024-01-10 | End: 2024-01-10 | Stop reason: HOSPADM

## 2024-01-10 RX ORDER — FENTANYL CITRATE 50 UG/ML
INJECTION, SOLUTION INTRAMUSCULAR; INTRAVENOUS
Status: DISCONTINUED | OUTPATIENT
Start: 2024-01-10 | End: 2024-01-10

## 2024-01-10 RX ORDER — PROPOFOL 10 MG/ML
VIAL (ML) INTRAVENOUS
Status: DISCONTINUED | OUTPATIENT
Start: 2024-01-10 | End: 2024-01-10

## 2024-01-10 RX ORDER — ROCURONIUM BROMIDE 10 MG/ML
INJECTION, SOLUTION INTRAVENOUS
Status: DISCONTINUED | OUTPATIENT
Start: 2024-01-10 | End: 2024-01-10

## 2024-01-10 RX ORDER — MIDAZOLAM HYDROCHLORIDE 1 MG/ML
.5-4 INJECTION INTRAMUSCULAR; INTRAVENOUS
Status: DISCONTINUED | OUTPATIENT
Start: 2024-01-10 | End: 2024-01-10 | Stop reason: HOSPADM

## 2024-01-10 RX ADMIN — PROPOFOL 200 MG: 10 INJECTION, EMULSION INTRAVENOUS at 08:01

## 2024-01-10 RX ADMIN — SODIUM CHLORIDE: 9 INJECTION, SOLUTION INTRAVENOUS at 08:01

## 2024-01-10 RX ADMIN — LIDOCAINE HYDROCHLORIDE 100 MG: 20 INJECTION INTRAVENOUS at 08:01

## 2024-01-10 RX ADMIN — SODIUM CHLORIDE, SODIUM GLUCONATE, SODIUM ACETATE, POTASSIUM CHLORIDE, MAGNESIUM CHLORIDE, SODIUM PHOSPHATE, DIBASIC, AND POTASSIUM PHOSPHATE: .53; .5; .37; .037; .03; .012; .00082 INJECTION, SOLUTION INTRAVENOUS at 11:01

## 2024-01-10 RX ADMIN — Medication 30 MG: at 08:01

## 2024-01-10 RX ADMIN — DEXAMETHASONE SODIUM PHOSPHATE 12 MG: 4 INJECTION, SOLUTION INTRAMUSCULAR; INTRAVENOUS at 09:01

## 2024-01-10 RX ADMIN — ROCURONIUM BROMIDE 50 MG: 10 INJECTION INTRAVENOUS at 08:01

## 2024-01-10 RX ADMIN — BUPIVACAINE HYDROCHLORIDE 15 ML: 5 INJECTION, SOLUTION EPIDURAL; INTRACAUDAL; PERINEURAL at 08:01

## 2024-01-10 RX ADMIN — DROPERIDOL 0.62 MG: 2.5 INJECTION, SOLUTION INTRAMUSCULAR; INTRAVENOUS at 09:01

## 2024-01-10 RX ADMIN — DIPHENHYDRAMINE HYDROCHLORIDE 6.25 MG: 50 INJECTION INTRAMUSCULAR; INTRAVENOUS at 09:01

## 2024-01-10 RX ADMIN — CEFAZOLIN 2 G: 2 INJECTION, POWDER, FOR SOLUTION INTRAMUSCULAR; INTRAVENOUS at 09:01

## 2024-01-10 RX ADMIN — SUGAMMADEX 200 MG: 100 INJECTION, SOLUTION INTRAVENOUS at 11:01

## 2024-01-10 RX ADMIN — BUPIVACAINE HYDROCHLORIDE 30 ML: 2.5 INJECTION, SOLUTION EPIDURAL; INFILTRATION; INTRACAUDAL; PERINEURAL at 08:01

## 2024-01-10 RX ADMIN — ACETAMINOPHEN 1000 MG: 500 TABLET ORAL at 07:01

## 2024-01-10 RX ADMIN — METHOCARBAMOL 1000 MG: 500 TABLET ORAL at 12:01

## 2024-01-10 RX ADMIN — CELECOXIB 200 MG: 200 CAPSULE ORAL at 07:01

## 2024-01-10 RX ADMIN — FENTANYL CITRATE 100 MCG: 50 INJECTION, SOLUTION INTRAMUSCULAR; INTRAVENOUS at 08:01

## 2024-01-10 RX ADMIN — FAMOTIDINE 20 MG: 10 INJECTION, SOLUTION INTRAVENOUS at 09:01

## 2024-01-10 RX ADMIN — MIDAZOLAM 4 MG: 1 INJECTION INTRAMUSCULAR; INTRAVENOUS at 08:01

## 2024-01-10 RX ADMIN — MIDAZOLAM HYDROCHLORIDE 2 MG: 1 INJECTION, SOLUTION INTRAMUSCULAR; INTRAVENOUS at 08:01

## 2024-01-10 NOTE — PROGRESS NOTES
Pre op completed.  Patient belongings given to wife. Bed in lowest position. Call light within reach. Family at beside. DME not needed. Bear hugger refused.

## 2024-01-10 NOTE — DISCHARGE SUMMARY
OUTPATIENT ORTHOPAEDIC SURGERY    Brief Operative Note       Surgery Date: 1/10/2024     Pre-op Diagnosis:  SLAP lesion of left shoulder [S43.432A]  Osteoarthritis of glenohumeral joint, left [M19.012]  Bicipital tendonitis of shoulder [M75.20]  Tear of rotator cuff [M75.100]    Post-op Diagnosis: SLAP lesion of left shoulder [S43.432A]  Osteoarthritis of glenohumeral joint, left [M19.012]  Bicipital tendonitis of shoulder [M75.20]  Tear of rotator cuff [M75.100]    Procedures: Procedure(s) (LRB):  ARTHROSCOPY,SHOULDER,WITH BICEPS TENODESIS (Left)  DEBRIDEMENT, SHOULDER, ARTHROSCOPIC- POLAR CARE (Left)  REPAIR, ROTATOR CUFF, ARTHROSCOPIC (Left)  ARTHROSCOPY, SHOULDER, WITH SUBACROMIAL SPACE DECOMPRESSION (Left)    Surgeon: Surgeon(s) and Role:     * CLOTILDE Farris MD - Primary    Anesthesia: General/Regional    Findings/Key Components: see dictated op note     Core Measure Documentation:  Were antibiotics extended? No  Was the patient administered a VTE Prophylaxis? No. Short procedure; low risk  Estimated Blood Loss: minimal           Specimens (From admission, onward)      None            Implants:   Implant Name Type Inv. Item Serial No.  Lot No. LRB No. Used Action   ANCHOR CORKSCREW FT [id number] - [id number]  ANCHOR CORKSCREW FT [id number]  [employer] [id number] Left 1 Implanted   ANCHOR SWIVELOCK KL 5.5X24.5MM - [id number]  ANCHOR SWIVELOCK KL 5.5X24.5MM  [employer] [id number] Left 1 Implanted   ANCHOR SWIVELOCK KL 5.5X24.5MM - [id number]  ANCHOR SWIVELOCK KL 5.5X24.5MM  [employer] [id number] Left 1 Implanted   SYS TENSIONTIGHT KL BUTTON IMP - [id number]  SYS TENSIONTIGHT KL BUTTON IMP  [employer] [id number] Left 1 Implanted       Complications: none           Disposition: PACU - hemodynamically stable.           Condition: Stable    Attestation:  I was present for the entire procedure.    Discharge Note      SUMMARY     Admit Date: 1/10/2024    Attending Physician: CLOTILDE Farris MD     Discharge Date:  01/10/2024    Final Diagnosis: please see operative report    Hospital Course of Care: Patient underwent elective surgery surgery and was transferred to PACU in stable condition.  In PACU, patient received appropriate post-operative care and was transferred to medical floor for neurovascular monitoring and pain control.  There patient progressed appropriately. Once met anesthesia derived discharge criteria, the patient was discharged home with plans for physical therapy and follow-up with the operative surgeon.    Disposition: Home or Self Care    Patient Instructions:   Current Discharge Medication List        CONTINUE these medications which have NOT CHANGED    Details   clonazePAM (KLONOPIN) 1 MG tablet Take 1 mg by mouth every evening.      dextroamphetamine-amphetamine (ADDERALL XR) 30 MG 24 hr capsule Take 30 mg by mouth every morning.      fluticasone propionate (FLONASE) 50 mcg/actuation nasal spray 1 spray by Each Nostril route once daily.      !! ibuprofen (ADVIL,MOTRIN) 800 MG tablet Take 1 tablet by mouth 3 (three) times daily. As needed for pain      omeprazole (PRILOSEC) 40 MG capsule Take 1 capsule (40 mg total) by mouth once daily.  Qty: 30 capsule, Refills: 5    Associated Diagnoses: Esophageal dysphagia; Gastroesophageal reflux disease, unspecified whether esophagitis present      aspirin 81 MG Chew Chew and swallow  1 tablet (81 mg total) by mouth 2 (two) times a day. for 14 days  Qty: 28 tablet, Refills: 0    Associated Diagnoses: Superior glenoid labrum lesion of left shoulder, subsequent encounter; Bicipital tendinitis of left shoulder      cetirizine (ZYRTEC) 10 MG tablet Take 10 mg by mouth daily as needed.      !! ibuprofen (ADVIL,MOTRIN) 800 MG tablet Take 800 mg by mouth every 8 (eight) hours as needed for Pain.      ondansetron (ZOFRAN) 4 MG tablet Take 1 tablet (4 mg total) by mouth every 8 (eight) hours as needed for Nausea.  Qty: 20 tablet, Refills: 0    Associated Diagnoses: Superior  glenoid labrum lesion of left shoulder, subsequent encounter; Bicipital tendinitis of left shoulder      oxyCODONE (ROXICODONE) 5 MG immediate release tablet Take 1 tablet (5 mg total) by mouth every 6 (six) hours as needed for Pain.  Qty: 28 tablet, Refills: 0    Associated Diagnoses: Superior glenoid labrum lesion of left shoulder, subsequent encounter; Bicipital tendinitis of left shoulder       !! - Potential duplicate medications found. Please discuss with provider.          Discharge Procedure Orders (must include Diet, Follow-up, Activity)  No discharge procedures on file.     Activity: Per printed postoperative instructions.     Diet: Resume pre-surgery diet.    Follow-up: 2 weeks with Dr. Carlos Farris

## 2024-01-10 NOTE — ANESTHESIA PREPROCEDURE EVALUATION
"                                                                                                             01/10/2024  Farzad Moore is a 48 y.o., male.    Pre-operative evaluation for Procedure(s) (LRB):  ARTHROSCOPY,SHOULDER,WITH BICEPS TENODESIS (Left)  DEBRIDEMENT, SHOULDER, ARTHROSCOPIC- POLAR CARE (Left)    Farzad Moore is a 48 y.o. male     Patient Active Problem List   Diagnosis    ADHD    Esophageal dysphagia       Review of patient's allergies indicates:  No Known Allergies    No current facility-administered medications on file prior to encounter.     Current Outpatient Medications on File Prior to Encounter   Medication Sig Dispense Refill    clonazePAM (KLONOPIN) 1 MG tablet Take 1 mg by mouth every evening.      dextroamphetamine-amphetamine (ADDERALL XR) 30 MG 24 hr capsule Take 30 mg by mouth every morning.      fluticasone propionate (FLONASE) 50 mcg/actuation nasal spray 1 spray by Each Nostril route once daily.      ibuprofen (ADVIL,MOTRIN) 800 MG tablet Take 1 tablet by mouth 3 (three) times daily. As needed for pain      omeprazole (PRILOSEC) 40 MG capsule Take 1 capsule (40 mg total) by mouth once daily. 30 capsule 5    cetirizine (ZYRTEC) 10 MG tablet Take 10 mg by mouth daily as needed.      ibuprofen (ADVIL,MOTRIN) 800 MG tablet Take 800 mg by mouth every 8 (eight) hours as needed for Pain.         Past Surgical History:   Procedure Laterality Date    ESOPHAGOGASTRODUODENOSCOPY N/A 11/11/2022    Procedure: EGD (ESOPHAGOGASTRODUODENOSCOPY);  Surgeon: Baylee Flores MD;  Location: [facility];  Service: Endoscopy;  Laterality: N/A;    HERNIA REPAIR      KNEE SURGERY           CBC:  No results found for: "WBC", "RBC", "HGB", "HCT", "PLT", "MCV", "MCH", "MCHC"    CMP:   No results found for: "NA", "K", "CL", "CO2", "BUN", "CREATININE", "GLU", "MG", "PHOS", "CALCIUM", "ALBUMIN", "PROT", "ALKPHOS", "ALT", "AST", "BILITOT"    INR:  No results found for: "PT", "INR", " ""PROTIME", "APTT"      Diagnostic Studies:      EKG:   Results for orders placed or performed during the hospital encounter of 05/21/17   EKG 12-lead    Collection Time: 05/21/17 10:47 PM    Narrative    Test Reason : R07.9  Blood Pressure : 125/088 mmHG  Vent. Rate : 080 BPM     Atrial Rate : 080 BPM     P-R Int : 142 ms          QRS Dur : 078 ms      QT Int : 350 ms       P-R-T Axes : 050 079 045 degrees     QTc Int : 403 ms    Normal sinus rhythm  Normal ECG  No previous ECGs available  Confirmed by Darrel BARBER MD ([id number]) on 5/22/2017 11:19:21 AM    Referred By: [email or web address]   SELF           Confirmed By:Darrel BARBER MD        2D Echo:  No results found for this or any previous visit.    Stress Test:   No results found for this or any previous visit.      Pre-op Vitals [01/10/24 0708]   BP Pulse Resp Temp SpO2   119/75 78 18 36.7 °C (98 °F) 99 %      Height Weight BMI (Calculated)     6' 5" 235 lb 27.9         Pre-op Vitals [01/10/24 0708]   BP Pulse Resp Temp SpO2   119/75 78 18 36.7 °C (98 °F) 99 %      Height Weight BMI (Calculated)     6' 5" 235 lb 27.9         Pre-op Assessment          Review of Systems      Physical Exam  General: Well nourished    Airway:  Mallampati: I / I  Mouth Opening: Normal  TM Distance: Normal  Tongue: Normal  Neck ROM: Normal ROM    Dental:  Intact    Chest/Lungs:  Clear to auscultation    Heart:  Rate: Normal  Rhythm: Regular Rhythm  Sounds: Normal    Anesthesia Plan  Type of Anesthesia, risks & benefits discussed:    Anesthesia Type: MAC, Gen ETT, Gen Supraglottic Airway, Gen Natural Airway, Regional  Intra-op Monitoring Plan: Standard ASA Monitors  Post Op Pain Control Plan: multimodal analgesia and peripheral nerve block  Induction:  IV  Airway Plan: Direct  Informed Consent: Informed consent signed with the Patient and all parties understand the risks and agree with anesthesia plan.  All questions answered.   ASA Score: 2  Day of Surgery Review of History & Physical: H&P Update " referred to the surgeon/provider.    Ready For Surgery From Anesthesia Perspective.   .

## 2024-01-10 NOTE — ANESTHESIA PROCEDURE NOTES
L IS SS    Patient location during procedure: pre-op   Block not for primary anesthetic.  Reason for block: at surgeon's request and post-op pain management   Post-op Pain Location: L shoulder pain   Start time: 1/10/2024 8:06 AM  Timeout: 1/10/2024 8:05 AM   End time: 1/10/2024 8:09 AM    Staffing  Authorizing Provider: Casandra Jones MD  Performing Provider: Casandra Jones MD    Staffing  Performed by: Casandra Jones MD  Authorized by: Casandra Jones MD    Preanesthetic Checklist  Completed: patient identified, IV checked, site marked, risks and benefits discussed, surgical consent, monitors and equipment checked, pre-op evaluation and timeout performed  Peripheral Block  Patient position: sitting  Prep: ChloraPrep  Patient monitoring: heart rate, cardiac monitor, continuous pulse ox, continuous capnometry and frequent blood pressure checks  Block type: interscalene  Laterality: right  Injection technique: single shot  Needle  Needle type: Stimuplex   Needle gauge: 22 G  Needle length: 2 in  Needle localization: anatomical landmarks and ultrasound guidance  Needle insertion depth: 2 cm   -ultrasound image captured on disc.  Assessment  Injection assessment: negative aspiration, negative parasthesia and local visualized surrounding nerve  Paresthesia pain: none  Heart rate change: no  Slow fractionated injection: yes  Pain Tolerance: comfortable throughout block and no complaints  Medications:    Medications: bupivacaine (pf) (MARCAINE) injection 0.5% - Perineural   15 mL - 1/10/2024 8:06:00 AM    Additional Notes  VSS.  DOS[facility] RN monitoring vitals throughout procedure.  Patient tolerated procedure well.     20 cc 0.375%  bupiv   VSS. DOS[facility] nurse monitoring vitals throughout  Local injected incrementally after neg asp  Local with 1: 400 k epi, 1 mg PF Decadron and 50 ug  PF Clonidine  Pt tolerated well

## 2024-01-10 NOTE — ANESTHESIA PROCEDURE NOTES
L Pec 1 & @    Patient location during procedure: pre-op   Block not for primary anesthetic.  Reason for block: at surgeon's request and post-op pain management   Post-op Pain Location: L chest pain   Start time: 1/10/2024 8:08 AM  Timeout: 1/10/2024 8:05 AM   End time: 1/10/2024 8:10 AM    Staffing  Authorizing Provider: Casandra Jones MD  Performing Provider: Casandra Jones MD    Staffing  Performed by: Casandra Jones MD  Authorized by: Casandra Jones MD    Preanesthetic Checklist  Completed: patient identified, IV checked, site marked, risks and benefits discussed, surgical consent, monitors and equipment checked, pre-op evaluation and timeout performed  Peripheral Block  Patient position: supine  Prep: ChloraPrep  Patient monitoring: heart rate, cardiac monitor, continuous pulse ox, continuous capnometry and frequent blood pressure checks  Block type: pectoral  Laterality: left  Injection technique: single shot  Needle  Needle type: Stimuplex   Needle gauge: 21 G  Needle length: 4 in  Needle localization: anatomical landmarks and ultrasound guidance  Needle insertion depth: 3 cm   -ultrasound image captured on disc.  Assessment  Injection assessment: negative aspiration, negative parasthesia and local visualized surrounding nerve  Paresthesia pain: none  Heart rate change: no  Slow fractionated injection: yes  Pain Tolerance: no complaints and comfortable throughout block  Medications:    Medications: bupivacaine (pf) (MARCAINE) injection 0.25% - Perineural   30 mL - 1/10/2024 8:09:00 AM    Additional Notes  0.25% bupiv  VSS. [facility] nurse monitoring vitals throughout  Local injected incrementally after neg asp  Local with 1: 400 k epi, 1 mg PF Decadron and 50 ug  PF Clonidine  Pt tolerated well       VSS.  [facility] RN monitoring vitals throughout procedure.  Patient tolerated procedure well.

## 2024-01-10 NOTE — OP NOTE
[facility]   OPERATIVE REPORT   ORTHOPAEDIC SURGERY   PROVIDER: DR. MICHELLE SEARS    PATIENT INFORMATION   Farzad Moore 48 y.o. male 1975   MRN: [id number]   LOCATION: [facility]     DATE OF PROCEDURE: 1/10/2024     PREOPERATIVE DIAGNOSES:   Left  1. Shoulder rotator cuff tear, partial thickness  2. Shoulder biceps tendinopathy  3. Shoulder labral tearing  4. Shoulder chondromalacia     POSTOPERATIVE DIAGNOSES:   Left  1. Shoulder rotator cuff tear, high-grade partial thickness with significant intrasubstance degeneration  2. Shoulder biceps tendinopathy  3. Shoulder degenerative labral tearing  4. 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 Shoulder synovitis  6. Shoulder capsulitis     OPERATION:   Left  1. Shoulder arthroscopic rotator cuff repair (CPT 10127)  2. Shoulder open subpectoral biceps tenodesis (CPT 73658)  3. Shoulder arthroscopic extensive debridement (CPT 06724)    4. Shoulder arthroscopic subacromial decompression     SURGEON: MICHELLE Sears MD     ASSISTANTS:  Caleb Billings DO - Fellow - First Assist  SMA Alana     First Assistant Duties: A first assistant was necessary for this procedure. Assistance was provided for surgical wound exposure, manipulation, and retractor placement and positioning. There was no qualified resident available for the procedure.      ANESTHESIA: General with interscalene block and local anesthetic injection (0.5% Marcaine plain)    ESTIMATED BLOOD LOSS:  40 cc    IMPLANTS:   Implant Name Type Inv. Item Serial No.  Lot No. LRB No. Used Action   ANCHOR CORKSCREW FT [id number] - [id number]  ANCHOR CORKSCREW FT [id number]  [employer] [id number] Left 1 Implanted   ANCHOR SWIVELOCK KL 5.5X24.5MM - [id number]  ANCHOR SWIVELOCK KL 5.5X24.5MM  [employer] [id number] Left 1 Implanted   ANCHOR SWIVELOCK KL 5.5X24.5MM - [id number]  ANCHOR SWIVELOCK KL 5.5X24.5MM  [employer] [id number] Left 1 Implanted   SYS TENSIONTIGHT KL  BUTTON IMP - [id number]  [facility] TENSIONTIGHT KL BUTTON IMP  [employer] [id number] Left 1 Implanted      SPECIMENS: None.    COMPLICATIONS: None.     INTRAOPERATIVE COUNTS: Correct.     PROPHYLACTIC IV ANTIBIOTICS: Given per [facility] Protocol.    INDICATIONS FOR PROCEDURE:   Farzad Moore 48 y.o. male  has been seen and evaluated in the office for continued left shoulder pain and mechanical symptoms.  After a lengthy discussion and failed nonoperative management, the patient wished to proceed with surgical intervention and was fully informed of risks and benefits.    DETAILS OF PROCEDURE:  After the correct operative site was marked by the operating surgeon, an interscalene block was administered by the anesthesia team.  The patient was then taken to the operating room and placed supine on the operating room table, where the patient underwent general anesthesia by the anesthesia team.  The patient was then rolled into the lateral decubitus position with the operative side up.  A well-padded axillary roll, beanbag and pillows were placed. All pressure points were carefully padded and checked. The upper extremities and both lower extremities were placed in comfortable positions and were also well-padded.      A verbal timeout was confirmed to identify the patient, operative site and planned operative procedure. It was also confirmed the patient had received preoperative IV antibiotic per protocol.     Examination under anesthesia demonstrated: Forward elevation 170 degrees, external rotation with arm to side 70 degrees.    The operative upper extremity was then prepped and draped in the usual sterile fashion.     The Spider arm positioner was implemented with balanced suspension and appropriate landmarks were noted on the skin.  A posterior followed by pao-superior portals were created and systematic examination of the joint revealed the following:      -Biceps tendinitis at the root attachment with intra-articular  tendinosis  -Diffuse synovitis from anterior to posterior with degenerative labral tearing circumferentially  -Thickened and inflamed capsular tissue over the anterior rotator interval extending distally through the MGHL  -A posterior push-pull maneuver with probing was performed demonstrating an intact subscapularis  -The undersurface of the superior cuff was torn at the level of the supraspinatus, low-grade on the articular side with some signs of intrasubstance degeneration  -No loose bodies  -Area of grade 3-4 chondral wear of the humeral head central to anterior aspect with some blistering of the articular cartilage anteriorly.  Delaminated, torn articular cartilage present inferiorly. Grade 2-4 chondral wear over the central to inferior aspect of the glenoid again with multiple unstable torn flaps of articular cartilage.    A shaver was again brought in to clear the field of view and to debride torn labrum circumferentially and undersurface cuff tearing from anterior to posterior. Extensive synovitis was also removed. Cautery was used to resect the interval to the coracoid and to release capsule through the MGHL. All thickened capsular tissue was released adjacent to the glenoid labrum. Care was taken to protect the axillary nerve during this portion of the procedure. Inferior capsular release was not needed in this case given intraoperative findings and examination under anesthesia.  The arthroscopic shaver was utilized to perform chondroplasty of the glenoid and humeral head areas of chondral wear.  All delaminated articular cartilage was gently debrided back to a stable margin.    The biceps was released with curved Mcgee scissors.     Attention was then turned to the subacromial space where significant hypertrophic bursa was encountered. Through an anterolateral working portal, shaver and cautery devices were introduced to clear the subacromial space of bursa and adhesions. Bursal reflections to the deltoid  fascia anteriorly and posteriorly were taken down to further expand this space. This created a nice room with a view. The undersurface of the acromion was exposed with cautery to delineate bony anatomy. Systematic examination of this space revealed the following:    -Subacromial spurring with narrowing of the anterolateral subacromial interval which was significant.  Small downward protruding spurring over the distal clavicle and medial acromion facet region.  -Fraying and degeneration of the CA ligament along with high-grade bursal sided tearing of the supraspinatus tendon extending from the crescent to the medial musculotendinous zone.  This was extensive and felt to be high-grade particularly over the lateral footprint.  The tear was easily completed with a shaver.  My preference would have been to avoid rotator cuff repair in this case given the advanced cartilage wear within the joint however this was felt to be significant rotator cuff pathology requiring repair.    The tuberosity was prepared with the shaver and power rasp through accessory posterolateral and posterior portals while looking from the standard anterolateral portal.  The tear was then repaired with Fiberwire suture originating from a single double load Corkscrew medial row anchor with sutures passed in horizontal mattress fashion across the tear. The medial row was then tied for a total of 2 knots. These were then taken laterally to two lateral row Swivelock anchors to complete the transosseous equivalent double row repair configuration.  Care was taken to avoid over constraint at the medial row knots site - to reduce the risk for type 2 re-tear.  The patient had intrasubstance delamination and interstitial tearing medial to the medial footprint repair.  Some consideration was given to placing a Regeneten patch in this case for augmentation but I felt that there would be some risk for increased stiffness which would not be ideal given the  condition of the glenohumeral joint.  Moreover I elected not to perform any side-to-side suture repair of the longitudinal interstitial musculotendinous zone tearing to avoid increased stress over the medial row repair site.  The cuff was felt to be under proper tension with good approximation to its native position on the greater tuberosity without excessive tension. Following repair, probing of the repair site revealed good tissue apposition to the footprint and good construct stability.     Subacromial decompression was completed using posterior cutting block technique in the standard fashion with a 4.5 mm barbara without difficulty.  The anterior osteophyte was flattened converting the acromion morphology from a type 3 to a type 1.  Confirmation of adequate resection was confirmed while viewing from the lateral portal and referencing from the posterior acromial undersurface.  The bur was used to take down the downward protruding spurs from the distal clavicle and the medial acromial facet as well.    The shoulder and subacromial space were then irrigated and fluid was extravasated using suction.     An axillary incision was made overlying the pectoralis inferior border, measuring 3-4 cm in length. The interval between the pectoralis major and medial conjoined and biceps short head was developed bluntly. Dissection was carried down to the humerus and the biceps long head tendon was retrieved from the wound. Tenosynovitis was noted around the tendon. Approximately 10 mm of tendon was whipstitched just proximal to the musculotendinous junction and the remainder of the tendon was then resected. A guidepin was placed, approximately 10 mm proximal to the inferior crossing border of the pectoralis, to create a unicortical hole. The knotless Tension Tight unicortical biceps button was then dunked into the hole, pulling the free sutures to advance the tendon to the hole for reattachment. Knotless fixation was secure with  proper biceps tensioning noted. The wound was then thoroughly irrigated with normal saline.      All portals were reapproximated using 3-0 Nylon. The axillary wound was closed with 3-0 Vicryl and 3-0 Monocryl. Mastisol and steri-strips were placed over the axillary wound. Local anesthetic was injected as well. Xeroform and absorbant mepilex pads were placed to cover all incisions.  A polar care shoulder sleeve was secured followed by a sling with abduction pillow. The patient was then repositioned supine, extubated and taken to the recovery room where the patient arrived in stable condition with the compartments of the arm and forearm soft and good perfusion in all digits.     POSTOPERATIVE PLAN OF CARE:  -Patient will be discharged home according to protocol.  -Physical Therapy: Follow the < 3 cm cuff repair rehab protocol. PROM starts immediately given the patient's increased risk in this case for postoperative stiffness.. AROM starts after 6 weeks. No cuff resistive activity x 12 weeks. No biceps resistive activity x 8 weeks. Sling and pillow immobilization for 6 weeks.  -DVT prophylaxis: ASA 81 mg twice a day x 2 weeks.

## 2024-01-10 NOTE — ANESTHESIA POSTPROCEDURE EVALUATION
----- Message from Margot Titus sent at 1/23/2017  9:24 AM CST -----  Contact: Patient herself  X 1st Request  _  2nd Request  _  3rd Request    Please refill the medication(s) listed below. Please call the patient when the prescription(s) is ready for  at the phone number [phone] .    Medication #1 lisinopril - hydrochlorothiazide 10- 12.5 MG    Preferred Pharmacy: [id number] - [place] - [address]     Anesthesia Post Evaluation    Patient: Farzad Moore    Procedure(s) Performed: Procedure(s) (LRB):  ARTHROSCOPY,SHOULDER,WITH BICEPS TENODESIS (Left)  DEBRIDEMENT, SHOULDER, ARTHROSCOPIC- POLAR CARE (Left)  REPAIR, ROTATOR CUFF, ARTHROSCOPIC (Left)  ARTHROSCOPY, SHOULDER, WITH SUBACROMIAL SPACE DECOMPRESSION (Left)    Final Anesthesia Type: general      Patient location during evaluation: PACU  Patient participation: Yes- Able to Participate  Level of consciousness: awake and alert  Post-procedure vital signs: reviewed and stable  Pain management: adequate  Airway patency: patent    PONV status at discharge: No PONV  Anesthetic complications: no      Cardiovascular status: blood pressure returned to baseline  Respiratory status: unassisted  Hydration status: euvolemic  Follow-up not needed.          Vitals Value Taken Time   /71 01/10/24 1232   Temp 36.4 °C (97.5 °F) 01/10/24 1230   Pulse 91 01/10/24 1241   Resp 24 01/10/24 1241   SpO2 96 % 01/10/24 1241   Vitals shown include unvalidated device data.      Event Time   Out of Recovery 12:30:00         Pain/Shirley Score: Pain Rating Prior to Med Admin: 0 (1/10/2024 11:35 AM)  Pain Rating Post Med Admin: 0 (1/10/2024 11:35 AM)  Shirley Score: 9 (1/10/2024 12:45 PM)

## 2024-01-10 NOTE — PLAN OF CARE
Pt states he is ready for D/C. D/C instructions reviewed with pt and family verbalized understanding. VSS. Pt denies c/o pain. No distress noted.

## 2024-01-10 NOTE — TRANSFER OF CARE
"Anesthesia Transfer of Care Note    Patient: Farzad Moore    Procedure(s) Performed: Procedure(s) (LRB):  ARTHROSCOPY,SHOULDER,WITH BICEPS TENODESIS (Left)  DEBRIDEMENT, SHOULDER, ARTHROSCOPIC- POLAR CARE (Left)  REPAIR, ROTATOR CUFF, ARTHROSCOPIC (Left)  ARTHROSCOPY, SHOULDER, WITH SUBACROMIAL SPACE DECOMPRESSION (Left)    Patient location: PACU    Anesthesia Type: general    Transport from OR: Transported from OR on 6-10 L/min O2 by face mask with adequate spontaneous ventilation    Post pain: adequate analgesia    Post assessment: no apparent anesthetic complications    Post vital signs: stable    Level of consciousness: sedated    Nausea/Vomiting: no nausea/vomiting    Complications: none    Transfer of care protocol was followed      Last vitals: Visit Vitals  /62 (BP Location: Right arm, Patient Position: Lying)   Pulse 80   Temp 36.3 °C (97.4 °F) (Temporal)   Resp 14   Ht 6' 5" (1.956 m)   Wt 106.6 kg (235 lb)   SpO2 96%   BMI 27.87 kg/m²     "

## 2024-01-10 NOTE — ANESTHESIA PROCEDURE NOTES
Intubation    Date/Time: 1/10/2024 8:58 AM    Performed by: Arnoldo Roach CRNA  Authorized by: Casandra Jones MD    Intubation:     Induction:  Intravenous    Intubated:  Postinduction    Mask Ventilation:  Easy mask    Attempts:  1    Attempted By:  CRNA    Method of Intubation:  Video laryngoscopy    Blade:  Soto 3    Laryngeal View Grade: Grade I - full view of cords      Difficult Airway Encountered?: No      Complications:  None    Airway Device:  Oral endotracheal tube    Airway Device Size:  7.5    Style/Cuff Inflation:  Cuffed    Inflation Amount (mL):  6    Tube secured:  21    Secured at:  The lips    Placement Verified By:  Capnometry    Complicating Factors:  None    Findings Post-Intubation:  BS equal bilateral and atraumatic/condition of teeth unchanged

## 2024-01-10 NOTE — OPERATIVE NOTE ADDENDUM
Certification of Assistant at Surgery       Surgery Date: 1/10/2024     Participating Surgeons:  Surgeon(s) and Role:     * CLOTILDE Farris MD - Primary    Procedures:  Procedure(s) (LRB):  ARTHROSCOPY,SHOULDER,WITH BICEPS TENODESIS (Left)  DEBRIDEMENT, SHOULDER, ARTHROSCOPIC- POLAR CARE (Left)  REPAIR, ROTATOR CUFF, ARTHROSCOPIC (Left)  ARTHROSCOPY, SHOULDER, WITH SUBACROMIAL SPACE DECOMPRESSION (Left)    Assistant Surgeon's Certification of Necessity:  I understand that section 1842 (b) (6) (d) of the Social Security Act generally prohibits Medicare Part B reasonable charge payment for the services of assistants at surgery in teaching hospitals when qualified residents are available to furnish such services. I certify that the services for which payment is claimed were medically necessary, and that no qualified resident was available to perform the services. I further understand that these services are subject to post-payment review by the Medicare carrier.      Caleb Billings DO    01/10/2024  11:29 AM

## 2024-01-12 ENCOUNTER — CLINICAL SUPPORT (OUTPATIENT)
Dept: REHABILITATION | Facility: HOSPITAL | Age: 49
End: 2024-01-12
Payer: COMMERCIAL

## 2024-01-12 DIAGNOSIS — M25.512 ACUTE PAIN OF LEFT SHOULDER: Primary | ICD-10-CM

## 2024-01-12 DIAGNOSIS — M75.22 BICIPITAL TENDINITIS OF LEFT SHOULDER: ICD-10-CM

## 2024-01-12 DIAGNOSIS — S43.432D SUPERIOR GLENOID LABRUM LESION OF LEFT SHOULDER, SUBSEQUENT ENCOUNTER: ICD-10-CM

## 2024-01-12 PROCEDURE — 97161 PT EVAL LOW COMPLEX 20 MIN: CPT | Performed by: PHYSICAL THERAPIST

## 2024-01-12 PROCEDURE — 97140 MANUAL THERAPY 1/> REGIONS: CPT | Performed by: PHYSICAL THERAPIST

## 2024-01-12 PROCEDURE — 97112 NEUROMUSCULAR REEDUCATION: CPT | Performed by: PHYSICAL THERAPIST

## 2024-01-16 ENCOUNTER — CLINICAL SUPPORT (OUTPATIENT)
Dept: REHABILITATION | Facility: HOSPITAL | Age: 49
End: 2024-01-16
Payer: COMMERCIAL

## 2024-01-16 DIAGNOSIS — S43.432D SUPERIOR GLENOID LABRUM LESION OF LEFT SHOULDER, SUBSEQUENT ENCOUNTER: ICD-10-CM

## 2024-01-16 DIAGNOSIS — M75.22 BICIPITAL TENDINITIS OF LEFT SHOULDER: ICD-10-CM

## 2024-01-16 DIAGNOSIS — M25.512 ACUTE PAIN OF LEFT SHOULDER: Primary | ICD-10-CM

## 2024-01-16 PROCEDURE — 97112 NEUROMUSCULAR REEDUCATION: CPT | Performed by: PHYSICAL THERAPIST

## 2024-01-16 PROCEDURE — 97140 MANUAL THERAPY 1/> REGIONS: CPT | Performed by: PHYSICAL THERAPIST

## 2024-01-16 RX ORDER — OXYCODONE HYDROCHLORIDE 5 MG/1
5 TABLET ORAL EVERY 6 HOURS PRN
Qty: 28 TABLET | Refills: 0 | Status: SHIPPED | OUTPATIENT
Start: 2024-01-16 | End: 2024-04-04

## 2024-01-16 NOTE — PROGRESS NOTES
Spoke c pt. Confirmed pharmacy. Pt will call c additional questions/concerns in interim. Pt expressed understanding & was thankful.

## 2024-01-20 PROBLEM — M25.512 SHOULDER PAIN, LEFT: Status: ACTIVE | Noted: 2024-01-20

## 2024-01-20 NOTE — PLAN OF CARE
[facility]   Physical Therapy Initial Evaluation      Name: Farzad Moore  [facility] Number: [id number]    Therapy Diagnosis:   Encounter Diagnoses   Name Primary?    Superior glenoid labrum lesion of left shoulder, subsequent encounter     Bicipital tendinitis of left shoulder     Acute pain of left shoulder Yes        Physician: Self, Aaareferral    Physician Orders: PT Eval and Treat   Medical Diagnosis from Referral:   S43.432D (ICD-10-CM) - Superior glenoid labrum lesion of left shoulder, subsequent encounter   M75.22 (ICD-10-CM) - Bicipital tendinitis of left shoulder     Evaluation Date: 1/12/2024  Authorization Period Expiration: 1/3/2025  Plan of Care Expiration: 5/31/2024  Progress Note Due: 2/22/2024  Visit # / Visits authorized: 1/ 1   FOTO: 1/3    Precautions: Standard,     OPERATION: 1/10/2024  Left  1. Shoulder arthroscopic rotator cuff repair (CPT 03509)  2. Shoulder open subpectoral biceps tenodesis (CPT 93866)  3. Shoulder arthroscopic extensive debridement (CPT 21400)    4. Shoulder arthroscopic subacromial decompression     Physical Therapy: Follow the < 3 cm cuff repair rehab protocol. PROM starts immediately given the patient's increased risk in this case for postoperative stiffness.. AROM starts after 6 weeks. No cuff resistive activity x 12 weeks. No biceps resistive activity x 8 weeks. Sling and pillow immobilization for 6 weeks.         Time In: 1330  Time Out: 1230  Total Appointment Time (timed & untimed codes): 60 minutes    Subjective     Date of onset: 1/10/2024    History of current condition - Farzad reports: gradual onset of shoulder pain leading to him to have surgery 2 days ago. Pain has been relatively controlled. Is not sure what his post op precautions are specifically but knows he needs to wear his sling at all times for 6 weeks.    Falls: No    Prior Therapy: No  Social History:  lives with their spouse  Prior Level of Function: Full  Current Level  of Function: Unable to complete ADLs    Pain:  Current 3/10, worst 5/10, best 1/10   Location: left shoulder    Description: Aching  Aggravating Factors: movement  Easing Factors: rest    Patients goals: Restore full function to the shoulder     Medical History:   Past Medical History:   Diagnosis Date    ADHD     Right inguinal hernia 12/14/2018       Surgical History:   Farzad Moore  has a past surgical history that includes Hernia repair; Knee surgery; Esophagogastroduodenoscopy (N/A, 11/11/2022); arthroscopy,shoulder,with biceps tenodesis (Left, 1/10/2024); Arthroscopic debridement of shoulder (Left, 1/10/2024); Arthroscopic repair of rotator cuff of shoulder (Left, 1/10/2024); and Arthroscopy of shoulder with decompression of subacromial space (Left, 1/10/2024).    Medications:   Farzad has a current medication list which includes the following prescription(s): aspirin, cetirizine, clonazepam, dextroamphetamine-amphetamine, fluticasone propionate, ibuprofen, ibuprofen, omeprazole, ondansetron, and oxycodone.    Allergies:   Review of patient's allergies indicates:  No Known Allergies     Objective      Observation: bandages still in place, no discharge noted or other overt signs of infection    Posture:  pt in shoulder sling with abduction pillow    Passive Range of Motion:   Shoulder Left   Flexion 70   Abduction 45   ER at 20 30   IR 30      Upper Extremity Strength:  Formal MMT not performed 2/2 POD#2 and increased pain.      Joint Mobility: hypomobile in all planes as expected post-op.    Palpation:  Mild warmth and swelling as expected post-op.    Sensation: Intact but diminished 2/2 residual nerve block.         Intake Outcome Measure for FOTO shoulder Survey    Therapist reviewed FOTO scores for Farzad Moore on 1/12/2024.   FOTO documents entered into EPIC - see Media section.    Intake Score: 24%         Treatment     Total Treatment time (time-based codes) separate from Evaluation: 20 minutes  "    Farzad received the treatments listed below:      therapeutic exercises to develop ROM, strength, flexibility, endurance for 00 minutes including:      manual therapy techniques: joint mobilizations and/or soft tissue mobilizations were applied to the: shoulder for 10 minutes, including:  Shoulder PROM to protocol    neuromuscular re-education activities to improve: motor control, balance, muscle activation, proprioception, posture for 10 minutes. The following activities were included:  Toe touches 20x  Pendulums 20x ea  Shoulder walk aways 30x  Seated scapular set 10 x 10"    therapeutic activities to improve functional performance for 00  minutes, including:      Patient Education and Home Exercises     Education provided:   - Diagnosis and prognosis  - Post op precautions    Written Home Exercises Provided: yes. Exercises were reviewed and Farzad was able to demonstrate them prior to the end of the session.  Farzad demonstrated good  understanding of the education provided. See EMR under Patient Instructions for exercises provided during therapy sessions.    Assessment     Farzad is a 48 y.o. male referred to outpatient Physical Therapy with a medical diagnosis of s/p L RCR repair. Patient presents with impaired mobility and strength as expected for POD 2.    Patient prognosis is Excellent.   Patient will benefit from skilled outpatient Physical Therapy to address the deficits stated above and in the chart below, provide patient /family education, and to maximize patientt's level of independence.     Plan of care discussed with patient: Yes  Patient's spiritual, cultural and educational needs considered and patient is agreeable to the plan of care and goals as stated below:     Anticipated Barriers for therapy: None    Medical Necessity is demonstrated by the following  History  Co-morbidities and personal factors that may impact the plan of care [] LOW: no personal factors / co-morbidities  [] MODERATE: " 1-2 personal factors / co-morbidities  [] HIGH: 3+ personal factors / co-morbidities    Moderate / High Support Documentation:   Co-morbidities affecting plan of care:     Personal Factors:        Examination  Body Structures and Functions, activity limitations and participation restrictions that may impact the plan of care [] LOW: addressing 1-2 elements  [] MODERATE: 3+ elements  [] HIGH: 4+ elements (please support below)    Moderate / High Support Documentation:      Clinical Presentation [] LOW: stable  [] MODERATE: Evolving  [] HIGH: Unstable     Decision Making/ Complexity Score: low       Goals:  GOALS: Short Term Goals: 6 weeks  1.Report decreased shoulder pain < / =  1/10  to increase tolerance for exercise  2. Increase PROM full ROM  4. Pt to tolerate HEP to improve ROM and independence with ADL's    Long Term Goals: 12 to 24 weeks  1.Report decreased shoulder pain  < / =  0 /10  to increase tolerance for ADLs  2.Increase AROM to equal to the contralateral limb  3.Increase strength to >/= 4/5 in shoulder girdle to increase tolerance for ADL and work activities.  4. Pt goal: restore full function  5. Pt will have improved gcode of CJ (20-40% limited) on FOTO shoulder in order to demonstrate true functional improvement.   Plan     Plan of care Certification: 1/12/2024 to 5/31/2024.    Outpatient Physical Therapy 2 times weekly for 10 weeks to include the following interventions: manual therapy, therapeutic exercise, therapeutic activities, and neuromuscular re-education.    Otoniel Prasad, PT, DPT  Board Certified in Orthopedic Physical Therapy  Fellow, [employer] Physical Therap[employer]

## 2024-01-21 NOTE — PROGRESS NOTES
[facility]   Physical Therapy Treatment Note      Name: Farzad Moore  [facility] Number: [id number]    Therapy Diagnosis:   Encounter Diagnosis   Name Primary?    Acute pain of left shoulder Yes     Physician: Kelly Trevino PA-C    Visit Date: 1/16/2024    Physician Orders: PT Eval and Treat   Medical Diagnosis from Referral:   S43.432D (ICD-10-CM) - Superior glenoid labrum lesion of left shoulder, subsequent encounter   M75.22 (ICD-10-CM) - Bicipital tendinitis of left shoulder      Evaluation Date: 1/12/2024  Authorization Period Expiration: 1/3/2025  Plan of Care Expiration: 5/31/2024  Progress Note Due: 2/22/2024  Visit # / Visits authorized: 1/ 20  FOTO: 1/3     Precautions: Standard,      OPERATION: 1/10/2024  Left  1. Shoulder arthroscopic rotator cuff repair (CPT 04702)  2. Shoulder open subpectoral biceps tenodesis (CPT 53705)  3. Shoulder arthroscopic extensive debridement (CPT 48002)    4. Shoulder arthroscopic subacromial decompression      Physical Therapy: Follow the < 3 cm cuff repair rehab protocol. PROM starts immediately given the patient's increased risk in this case for postoperative stiffness.. AROM starts after 6 weeks. No cuff resistive activity x 12 weeks. No biceps resistive activity x 8 weeks. Sling and pillow immobilization for 6 weeks.             PTA Visit #: 0/5     Time In: 1500  Time Out: 1600  Total Billable Time: 35 minutes    Subjective     Pt reports: Minimal pain. Able to complete his HE.  He was compliant with home exercise program.  Response to previous treatment: Improved mobility  Functional change: NA    Pain: 1/10  Location: left shoulder      Objective      Objective Measures updated at progress report unless specified.     Passive Range of Motion:   Shoulder Left   Flexion 90   Abduction 70   ER at 20 30   IR 30         Treatment     Farzad received the treatments listed below:      therapeutic exercises to develop ROM, strength, flexibility,  "endurance for 00 minutes including:        manual therapy techniques: joint mobilizations and/or soft tissue mobilizations were applied to the: shoulder for 10 minutes, including:  Shoulder PROM to protocol  GH joint mobilizations gr I/II     neuromuscular re-education activities to improve: motor control, balance, muscle activation, proprioception, posture for 25 minutes. The following activities were included:  Toe touches 30x for self joint mobilization gr I/II  Pendulums 20x ea 20x for self joint mobilization gr I/II  Shoulder walk aways 30x 20x for self joint mobilization gr I/II  Seated scapular set 10 x 10"  Seated shoulder shrug 10 x 10"     therapeutic activities to improve functional performance for 00  minutes, including:    Patient Education and Home Exercises       Education provided:   - Diagnosis and prognosis  - Post op precautions     Written Home Exercises Provided: yes. Exercises were reviewed and Farzad was able to demonstrate them prior to the end of the session.  Farzad demonstrated good  understanding of the education provided. See EMR under Patient Instructions for exercises provided during therapy sessions.    Assessment     Shows improved mobility. Some limitations in passive flexion, Will work to progress next session. Increase to table slides and unloaded AAROM ER.     Farzad Is progressing well towards his goals.   Pt prognosis is Good.     Pt will continue to benefit from skilled outpatient physical therapy to address the deficits listed in the problem list box on initial evaluation, provide pt/family education and to maximize pt's level of independence in the home and community environment.     Pt's spiritual, cultural and educational needs considered and pt agreeable to plan of care and goals.     Anticipated barriers to physical therapy: None    GOALS: Short Term Goals: 6 weeks  1.Report decreased shoulder pain < / =  1/10  to increase tolerance for exercise  2. Increase PROM full " ROM  4. Pt to tolerate HEP to improve ROM and independence with ADL's     Long Term Goals: 12 to 24 weeks  1.Report decreased shoulder pain  < / =  0 /10  to increase tolerance for ADLs  2.Increase AROM to equal to the contralateral limb  3.Increase strength to >/= 4/5 in shoulder girdle to increase tolerance for ADL and work activities.  4. Pt goal: restore full function  5. Pt will have improved gcode of CJ (20-40% limited) on FOTO shoulder in order to demonstrate true functional improvement.     Plan     Plan of care Certification: 1/12/2024 to 5/31/2024.     Outpatient Physical Therapy 2 times weekly for 10 weeks to include the following interventions: manual therapy, therapeutic exercise, therapeutic activities, and neuromuscular re-education.     Otoniel Prasad, PT, DPT  Board Certified in Orthopedic Physical Therapy  Fellow, [employer] Physical Therapists

## 2024-01-22 ENCOUNTER — PATIENT MESSAGE (OUTPATIENT)
Dept: SPORTS MEDICINE | Facility: CLINIC | Age: 49
End: 2024-01-22
Payer: COMMERCIAL

## 2024-01-22 NOTE — PROGRESS NOTES
Spoke c pt. He is 12d s/p L shoulder RCR, open BT, SAD c Dr. Farris 01/10/24. Advised that he keep Aquacel bandage in place, OK to use bandage tape for reinforcement. R/s 2w PO appt from 01/25/24 to 01/23/24 following PT. Confirmed appt date, time, location. Pt will call c additional questions/concerns in interim. Pt expressed understanding & was thankful.

## 2024-01-23 ENCOUNTER — OFFICE VISIT (OUTPATIENT)
Dept: SPORTS MEDICINE | Facility: CLINIC | Age: 49
End: 2024-01-23
Payer: COMMERCIAL

## 2024-01-23 ENCOUNTER — CLINICAL SUPPORT (OUTPATIENT)
Dept: REHABILITATION | Facility: HOSPITAL | Age: 49
End: 2024-01-23
Payer: COMMERCIAL

## 2024-01-23 VITALS
HEIGHT: 77 IN | HEART RATE: 84 BPM | SYSTOLIC BLOOD PRESSURE: 127 MMHG | DIASTOLIC BLOOD PRESSURE: 86 MMHG | BODY MASS INDEX: 27.85 KG/M2 | WEIGHT: 235.88 LBS

## 2024-01-23 DIAGNOSIS — M25.512 ACUTE PAIN OF LEFT SHOULDER: Primary | ICD-10-CM

## 2024-01-23 DIAGNOSIS — Z98.890 S/P LEFT ROTATOR CUFF REPAIR: Primary | ICD-10-CM

## 2024-01-23 PROCEDURE — 1159F MED LIST DOCD IN RCRD: CPT | Mod: CPTII,S$GLB,, | Performed by: PHYSICIAN ASSISTANT

## 2024-01-23 PROCEDURE — 97140 MANUAL THERAPY 1/> REGIONS: CPT | Mod: CQ

## 2024-01-23 PROCEDURE — 3074F SYST BP LT 130 MM HG: CPT | Mod: CPTII,S$GLB,, | Performed by: PHYSICIAN ASSISTANT

## 2024-01-23 PROCEDURE — 3079F DIAST BP 80-89 MM HG: CPT | Mod: CPTII,S$GLB,, | Performed by: PHYSICIAN ASSISTANT

## 2024-01-23 PROCEDURE — 97112 NEUROMUSCULAR REEDUCATION: CPT | Mod: CQ

## 2024-01-23 PROCEDURE — 99024 POSTOP FOLLOW-UP VISIT: CPT | Mod: S$GLB,,, | Performed by: PHYSICIAN ASSISTANT

## 2024-01-23 PROCEDURE — 1160F RVW MEDS BY RX/DR IN RCRD: CPT | Mod: CPTII,S$GLB,, | Performed by: PHYSICIAN ASSISTANT

## 2024-01-23 PROCEDURE — 99999 PR PBB SHADOW E&M-EST. PATIENT-LVL III: CPT | Mod: PBBFAC,,, | Performed by: PHYSICIAN ASSISTANT

## 2024-01-23 RX ORDER — HYDROCODONE BITARTRATE AND ACETAMINOPHEN 5; 325 MG/1; MG/1
1 TABLET ORAL EVERY 6 HOURS PRN
Qty: 20 TABLET | Refills: 0 | Status: SHIPPED | OUTPATIENT
Start: 2024-01-23 | End: 2024-01-29 | Stop reason: SDUPTHER

## 2024-01-23 RX ORDER — CELECOXIB 200 MG/1
200 CAPSULE ORAL 2 TIMES DAILY
Qty: 56 CAPSULE | Refills: 0 | Status: SHIPPED | OUTPATIENT
Start: 2024-01-23 | End: 2024-02-20

## 2024-01-23 NOTE — PROGRESS NOTES
S:Farzad Moore presents for post-operative evaluation.     DATE OF PROCEDURE: 1/10/2024      OPERATION:   Left  1. Shoulder arthroscopic rotator cuff repair (CPT 60382)  2. Shoulder open subpectoral biceps tenodesis (CPT 22763)  3. Shoulder arthroscopic extensive debridement (CPT 90373)    4. Shoulder arthroscopic subacromial decompression        Farzad Moore reports to be doing well 2wk s/p the above mentioned procedure. Denies fevers, chills, night sweats, chest pain, difficulty breathing, calf pain or tenderness. Going to PT 2xWeek at the [place] location with Otoniel. Seeing good progress daily. Pain levels are improving. Taking Oxy 5mg at night and for PT. Completed ASA.    O: The incisions are healing well.  No signs of infection.  Sutures were removed. No significant pain or unusual tenderness.    A/P: Patient doing well. Incisions healing well. Ok to shower with incisions uncovered. No submerging at this point. Continue sling adherence. He is almost out of the Oxy. Discussed going down to the Hydrocodone- patient in agreement with that plan. I will also add on Celebrex due to patient's increased risk for post op stiffness. Nothing heavier in his hand than a his phone or a cup. Plan to follow the rehab plan as previously outlined. RTC in 4 weeks.     POSTOPERATIVE PLAN OF CARE:  -Patient will be discharged home according to protocol.  -Physical Therapy: Follow the < 3 cm cuff repair rehab protocol. PROM starts immediately given the patient's increased risk in this case for postoperative stiffness.. AROM starts after 6 weeks. No cuff resistive activity x 12 weeks. No biceps resistive activity x 8 weeks. Sling and pillow immobilization for 6 weeks.  -DVT prophylaxis: ASA 81 mg twice a day x 2 weeks.

## 2024-01-23 NOTE — PROGRESS NOTES
[facility]   Physical Therapy Treatment Note      Name: Farzad Moore  [facility] Number: [id number]    Therapy Diagnosis:   Encounter Diagnosis   Name Primary?    Acute pain of left shoulder Yes       Physician: Kelly Trevino PA-C    Visit Date: 1/23/2024    Physician Orders: PT Eval and Treat   Medical Diagnosis from Referral:   S43.432D (ICD-10-CM) - Superior glenoid labrum lesion of left shoulder, subsequent encounter   M75.22 (ICD-10-CM) - Bicipital tendinitis of left shoulder      Evaluation Date: 1/12/2024  Authorization Period Expiration: 1/3/2025  Plan of Care Expiration: 5/31/2024  Progress Note Due: 2/22/2024  Visit # / Visits authorized: 1/ 20  FOTO: 1/3     Precautions: Standard,      OPERATION: 1/10/2024  Left  1. Shoulder arthroscopic rotator cuff repair (CPT 62456)  2. Shoulder open subpectoral biceps tenodesis (CPT 82673)  3. Shoulder arthroscopic extensive debridement (CPT 93342)    4. Shoulder arthroscopic subacromial decompression      Physical Therapy: Follow the < 3 cm cuff repair rehab protocol. PROM starts immediately given the patient's increased risk in this case for postoperative stiffness.. AROM starts after 6 weeks. No cuff resistive activity x 12 weeks. No biceps resistive activity x 8 weeks. Sling and pillow immobilization for 6 weeks.             PTA Visit #: 0/5     Time In: 0910  Time Out: 0955  Total Billable Time: 45 minutes    Subjective     Pt reports: Minimal pain. Able to complete his HE.  He was compliant with home exercise program.  Response to previous treatment: Improved mobility  Functional change: NA    Pain: 1/10  Location: left shoulder      Objective      Objective Measures updated at progress report unless specified.     Passive Range of Motion:   Shoulder Left   Flexion 90   Abduction 90   ER at 20 40   IR To stomach         Treatment     Farzad received the treatments listed below:      therapeutic exercises to develop ROM, strength,  "flexibility, endurance for 00 minutes including:        manual therapy techniques: joint mobilizations and/or soft tissue mobilizations were applied to the: shoulder for 10 minutes, including:  Shoulder PROM to protocol  GH joint mobilizations gr I/II     neuromuscular re-education activities to improve: motor control, balance, muscle activation, proprioception, posture for 35 minutes. The following activities were included:  Toe touches 30x for self joint mobilization gr I/II-NP  Pendulums wrist FL/ext x 2 min  Shoulder walk aways 30x 20x for self joint mobilization gr I/II  Bent over scap retractions 5" hold 3x10  Seated shoulder shrug 30x  AAROM wand elbow FL/ext 3x10    therapeutic activities to improve functional performance for 00  minutes, including:    Patient Education and Home Exercises       Education provided:   - Diagnosis and prognosis  - Post op precautions     Written Home Exercises Provided: yes. Exercises were reviewed and Farzad was able to demonstrate them prior to the end of the session.  Farzad demonstrated good  understanding of the education provided. See EMR under Patient Instructions for exercises provided during therapy sessions.    Assessment   Farzad is showing improvements in PROM and is hitting goals at this time. Progressed scapular strengthening with good scapular motor control. Added table slides with good tolerance. Will cont to progress as per protocol with most important keep shoulder quiet at this time.     Farzad Is progressing well towards his goals.   Pt prognosis is Good.     Pt will continue to benefit from skilled outpatient physical therapy to address the deficits listed in the problem list box on initial evaluation, provide pt/family education and to maximize pt's level of independence in the home and community environment.     Pt's spiritual, cultural and educational needs considered and pt agreeable to plan of care and goals.     Anticipated barriers to physical " therapy: None    GOALS: Short Term Goals: 6 weeks  1.Report decreased shoulder pain < / =  1/10  to increase tolerance for exercise  2. Increase PROM full ROM  4. Pt to tolerate HEP to improve ROM and independence with ADL's     Long Term Goals: 12 to 24 weeks  1.Report decreased shoulder pain  < / =  0 /10  to increase tolerance for ADLs  2.Increase AROM to equal to the contralateral limb  3.Increase strength to >/= 4/5 in shoulder girdle to increase tolerance for ADL and work activities.  4. Pt goal: restore full function  5. Pt will have improved gcode of CJ (20-40% limited) on FOTO shoulder in order to demonstrate true functional improvement.     Plan     Plan of care Certification: 1/12/2024 to 5/31/2024.     Outpatient Physical Therapy 2 times weekly for 10 weeks to include the following interventions: manual therapy, therapeutic exercise, therapeutic activities, and neuromuscular re-education.     Adrianne Palmer

## 2024-01-26 ENCOUNTER — CLINICAL SUPPORT (OUTPATIENT)
Dept: REHABILITATION | Facility: HOSPITAL | Age: 49
End: 2024-01-26
Payer: COMMERCIAL

## 2024-01-26 DIAGNOSIS — M25.512 ACUTE PAIN OF LEFT SHOULDER: Primary | ICD-10-CM

## 2024-01-26 PROCEDURE — 97140 MANUAL THERAPY 1/> REGIONS: CPT | Performed by: PHYSICAL THERAPIST

## 2024-01-26 PROCEDURE — 97112 NEUROMUSCULAR REEDUCATION: CPT | Performed by: PHYSICAL THERAPIST

## 2024-01-29 ENCOUNTER — CLINICAL SUPPORT (OUTPATIENT)
Dept: REHABILITATION | Facility: HOSPITAL | Age: 49
End: 2024-01-29
Payer: COMMERCIAL

## 2024-01-29 DIAGNOSIS — Z98.890 S/P LEFT ROTATOR CUFF REPAIR: Primary | ICD-10-CM

## 2024-01-29 DIAGNOSIS — M25.512 ACUTE PAIN OF LEFT SHOULDER: Primary | ICD-10-CM

## 2024-01-29 PROCEDURE — 97112 NEUROMUSCULAR REEDUCATION: CPT | Mod: CQ

## 2024-01-29 PROCEDURE — 97140 MANUAL THERAPY 1/> REGIONS: CPT | Mod: CQ

## 2024-01-29 RX ORDER — HYDROCODONE BITARTRATE AND ACETAMINOPHEN 5; 325 MG/1; MG/1
1 TABLET ORAL EVERY 6 HOURS PRN
Qty: 20 TABLET | Refills: 0 | Status: SHIPPED | OUTPATIENT
Start: 2024-01-29 | End: 2024-04-04

## 2024-01-29 NOTE — PROGRESS NOTES
[facility]   Physical Therapy Treatment Note      Name: Farzad Moore  [facility] Number: [id number]    Therapy Diagnosis:   No diagnosis found.      Physician: Kelly Trevino PA-C    Visit Date: 1/26/2024    Physician Orders: PT Eval and Treat   Medical Diagnosis from Referral:   S43.432D (ICD-10-CM) - Superior glenoid labrum lesion of left shoulder, subsequent encounter   M75.22 (ICD-10-CM) - Bicipital tendinitis of left shoulder      Evaluation Date: 1/12/2024  Authorization Period Expiration: 1/3/2025  Plan of Care Expiration: 5/31/2024  Progress Note Due: 2/22/2024  Visit # / Visits authorized: 3/ 20  FOTO: 1/3     Precautions: Standard,      OPERATION: 1/10/2024  Left  1. Shoulder arthroscopic rotator cuff repair (CPT 74638)  2. Shoulder open subpectoral biceps tenodesis (CPT 83271)  3. Shoulder arthroscopic extensive debridement (CPT 90495)    4. Shoulder arthroscopic subacromial decompression      Physical Therapy: Follow the < 3 cm cuff repair rehab protocol. PROM starts immediately given the patient's increased risk in this case for postoperative stiffness.. AROM starts after 6 weeks. No cuff resistive activity x 12 weeks. No biceps resistive activity x 8 weeks. Sling and pillow immobilization for 6 weeks.             PTA Visit #: 0/5     Time In: 0900  Time Out: 1000  Total Billable Time: 48 minutes    Subjective     Pt reports: No real pain. Feels a little stiffer today/   He was compliant with home exercise program.  Response to previous treatment: Improved mobility  Functional change: NA    Pain: 1/10  Location: left shoulder      Objective      Objective Measures updated at progress report unless specified.     Passive Range of Motion:   Shoulder Left   Flexion 90   Abduction 90   ER at 20 40   IR To stomach         Treatment     Farzad received the treatments listed below:      therapeutic exercises to develop ROM, strength, flexibility, endurance for 00 minutes  "including:        manual therapy techniques: joint mobilizations and/or soft tissue mobilizations were applied to the: shoulder for 10 minutes, including:  Shoulder PROM to protocol  GH joint mobilizations gr I/II     neuromuscular re-education activities to improve: motor control, balance, muscle activation, proprioception, posture for 38 minutes. The following activities were included:  Toe touches 30x for self joint mobilization gr I/II-NP  Pendulums wrist FL/ext x 2 min  Shoulder walk aways 30x 20x for self joint mobilization gr I/II  Bent over scap retractions 5" hold 3x10  Seated shoulder shrug 30x  AAROM wand elbow FL/ext 3x10  Swiss ball AAROM flexion 30x  Table slide 10x (scaption)  Seated ER AAROM w/ wand 30x    therapeutic activities to improve functional performance for 00  minutes, including:    Patient Education and Home Exercises       Education provided:   - Diagnosis and prognosis  - Post op precautions     Written Home Exercises Provided: yes. Exercises were reviewed and Farzad was able to demonstrate them prior to the end of the session.  Farzad demonstrated good  understanding of the education provided. See EMR under Patient Instructions for exercises provided during therapy sessions.    Assessment   Increased stiffness in ER today. Increased HEP to include AAROM ER. Did well in session. Continue to focus on mobility, we do not want him to develop stiffness. Increase to 3x per week if necessary.     Farzad Is progressing well towards his goals.   Pt prognosis is Good.     Pt will continue to benefit from skilled outpatient physical therapy to address the deficits listed in the problem list box on initial evaluation, provide pt/family education and to maximize pt's level of independence in the home and community environment.     Pt's spiritual, cultural and educational needs considered and pt agreeable to plan of care and goals.     Anticipated barriers to physical therapy: None    GOALS: Short " Term Goals: 6 weeks  1.Report decreased shoulder pain < / =  1/10  to increase tolerance for exercise  2. Increase PROM full ROM  4. Pt to tolerate HEP to improve ROM and independence with ADL's     Long Term Goals: 12 to 24 weeks  1.Report decreased shoulder pain  < / =  0 /10  to increase tolerance for ADLs  2.Increase AROM to equal to the contralateral limb  3.Increase strength to >/= 4/5 in shoulder girdle to increase tolerance for ADL and work activities.  4. Pt goal: restore full function  5. Pt will have improved gcode of CJ (20-40% limited) on FOTO shoulder in order to demonstrate true functional improvement.     Plan     Plan of care Certification: 1/12/2024 to 5/31/2024.     Outpatient Physical Therapy 2 times weekly for 10 weeks to include the following interventions: manual therapy, therapeutic exercise, therapeutic activities, and neuromuscular re-education.     Otoniel Prasad

## 2024-01-29 NOTE — PROGRESS NOTES
[facility]   Physical Therapy Treatment Note      Name: Farzad Moore  [facility] Number: [id number]    Therapy Diagnosis:   Encounter Diagnosis   Name Primary?    Acute pain of left shoulder Yes         Physician: Kelly Trevino PA-C    Visit Date: 1/29/2024    Physician Orders: PT Eval and Treat   Medical Diagnosis from Referral:   S43.432D (ICD-10-CM) - Superior glenoid labrum lesion of left shoulder, subsequent encounter   M75.22 (ICD-10-CM) - Bicipital tendinitis of left shoulder      Evaluation Date: 1/12/2024  Authorization Period Expiration: 1/3/2025  Plan of Care Expiration: 5/31/2024  Progress Note Due: 2/22/2024  Visit # / Visits authorized: 4/ 20  FOTO: 1/3     Precautions: Standard,      OPERATION: 1/10/2024  Left  1. Shoulder arthroscopic rotator cuff repair (CPT 47796)  2. Shoulder open subpectoral biceps tenodesis (CPT 47733)  3. Shoulder arthroscopic extensive debridement (CPT 25971)    4. Shoulder arthroscopic subacromial decompression      Physical Therapy: Follow the < 3 cm cuff repair rehab protocol. PROM starts immediately given the patient's increased risk in this case for postoperative stiffness.. AROM starts after 6 weeks. No cuff resistive activity x 12 weeks. No biceps resistive activity x 8 weeks. Sling and pillow immobilization for 6 weeks.             PTA Visit #: 0/5     Time In: 0912  Time Out: 1000  Total Billable Time: 48 minutes    Subjective     Pt reports: shoulder is feeling fine  He was compliant with home exercise program.  Response to previous treatment: Improved mobility  Functional change: NA    Pain: 1/10  Location: left shoulder      Objective      Objective Measures updated at progress report unless specified.     Passive Range of Motion:   Shoulder Left   Flexion 90   Abduction 90   ER at 20 40   IR To stomach         Treatment     Farzad received the treatments listed below:      therapeutic exercises to develop ROM, strength, flexibility,  "endurance for 00 minutes including:        manual therapy techniques: joint mobilizations and/or soft tissue mobilizations were applied to the: shoulder for 15 minutes, including:  Shoulder PROM to protocol  GH joint mobilizations gr I/II   shoulder assessment by PT Otoniel Prasad    neuromuscular re-education activities to improve: motor control, balance, muscle activation, proprioception, posture for 33 minutes. The following activities were included:  Toe touches 30x for self joint mobilization gr I/II-NP  Pendulums wrist FL/ext x 2 min-np  Shoulder walk aways  20x for self joint mobilization gr I/II  Bent over scap retractions 5" hold 3x10  Seated shoulder shrug 30x  AAROM wand elbow FL/ext 3x10  Swiss ball AAROM flexion 30x-np  Table slide FL/scapiton x3 min each  Supine ER AAROM w/ wand @ 0 degrees and 45 30x5" holds    therapeutic activities to improve functional performance for 00  minutes, including:    Patient Education and Home Exercises       Education provided:   - Diagnosis and prognosis  - Post op precautions     Written Home Exercises Provided: yes. Exercises were reviewed and Farzad was able to demonstrate them prior to the end of the session.  Farzad demonstrated good  understanding of the education provided. See EMR under Patient Instructions for exercises provided during therapy sessions.    Assessment   Decreased stiffness into ER at 0 and 45 degrees compared to previous session. FL to 90 and passed without soft tissue or joint restrictions. Will cont to progress per protocol with emphasis on slow ER improvements each session.     Farzad Is progressing well towards his goals.   Pt prognosis is Good.     Pt will continue to benefit from skilled outpatient physical therapy to address the deficits listed in the problem list box on initial evaluation, provide pt/family education and to maximize pt's level of independence in the home and community environment.     Pt's spiritual, cultural and " educational needs considered and pt agreeable to plan of care and goals.     Anticipated barriers to physical therapy: None    GOALS: Short Term Goals: 6 weeks  1.Report decreased shoulder pain < / =  1/10  to increase tolerance for exercise  2. Increase PROM full ROM  4. Pt to tolerate HEP to improve ROM and independence with ADL's     Long Term Goals: 12 to 24 weeks  1.Report decreased shoulder pain  < / =  0 /10  to increase tolerance for ADLs  2.Increase AROM to equal to the contralateral limb  3.Increase strength to >/= 4/5 in shoulder girdle to increase tolerance for ADL and work activities.  4. Pt goal: restore full function  5. Pt will have improved gcode of CJ (20-40% limited) on FOTO shoulder in order to demonstrate true functional improvement.     Plan     Plan of care Certification: 1/12/2024 to 5/31/2024.     Outpatient Physical Therapy 2 times weekly for 10 weeks to include the following interventions: manual therapy, therapeutic exercise, therapeutic activities, and neuromuscular re-education.     Adrianne Palmer

## 2024-02-05 ENCOUNTER — CLINICAL SUPPORT (OUTPATIENT)
Dept: REHABILITATION | Facility: HOSPITAL | Age: 49
End: 2024-02-05
Payer: COMMERCIAL

## 2024-02-05 DIAGNOSIS — M25.512 ACUTE PAIN OF LEFT SHOULDER: Primary | ICD-10-CM

## 2024-02-05 PROCEDURE — 97110 THERAPEUTIC EXERCISES: CPT | Performed by: PHYSICAL THERAPIST

## 2024-02-05 PROCEDURE — 97140 MANUAL THERAPY 1/> REGIONS: CPT | Performed by: PHYSICAL THERAPIST

## 2024-02-05 PROCEDURE — 97112 NEUROMUSCULAR REEDUCATION: CPT | Performed by: PHYSICAL THERAPIST

## 2024-02-05 NOTE — PROGRESS NOTES
[facility]   Physical Therapy Treatment Note      Name: Farzad Moore  [facility] Number: [id number]    Therapy Diagnosis:   Encounter Diagnosis   Name Primary?    Acute pain of left shoulder Yes         Physician: Kelly Trevino PA-C    Visit Date: 2/5/2024    Physician Orders: PT Eval and Treat   Medical Diagnosis from Referral:   S43.432D (ICD-10-CM) - Superior glenoid labrum lesion of left shoulder, subsequent encounter   M75.22 (ICD-10-CM) - Bicipital tendinitis of left shoulder      Evaluation Date: 1/12/2024  Authorization Period Expiration: 1/3/2025  Plan of Care Expiration: 5/31/2024  Progress Note Due: 2/22/2024  Visit # / Visits authorized: 5/ 20  FOTO: 1/3     Precautions: Standard,      OPERATION: 1/10/2024  Left  1. Shoulder arthroscopic rotator cuff repair (CPT 87496)  2. Shoulder open subpectoral biceps tenodesis (CPT 93318)  3. Shoulder arthroscopic extensive debridement (CPT 15655)    4. Shoulder arthroscopic subacromial decompression      Physical Therapy: Follow the < 3 cm cuff repair rehab protocol. PROM starts immediately given the patient's increased risk in this case for postoperative stiffness.. AROM starts after 6 weeks. No cuff resistive activity x 12 weeks. No biceps resistive activity x 8 weeks. Sling and pillow immobilization for 6 weeks.             PTA Visit #: 0/5     Time In: 0855  Time Out: 1000  Total Billable Time:50 minutes    Subjective     Pt reports: slight increase in soreness today because he did more over the weekend for work. Was in his sling the whole time and used his R arm for everything.  He was compliant with home exercise program.  Response to previous treatment: Improved mobility  Functional change: NA    Pain: 1/10  Location: left shoulder      Objective      Objective Measures updated at progress report unless specified.     DOS: 1/10/2024  POW 3 weeks 6 days    Passive Range of Motion:   Shoulder Left   Flexion 110   Abduction 90   ER  "at 20 50   IR To stomach         Treatment     Farzad received the treatments listed below:      therapeutic exercises to develop ROM, strength, flexibility, endurance for 10 minutes including:   Standing ER AAROM wand at wall 2 x 10  Table slides flexion and abduction 3' ea     manual therapy techniques: joint mobilizations and/or soft tissue mobilizations were applied to the: shoulder for 15 minutes, including:  Shoulder PROM to protocol  GH joint mobilizations gr I/II    neuromuscular re-education activities to improve: motor control, balance, muscle activation, proprioception, posture for 25 minutes. The following activities were included:  Shoulder walk aways flex/ABD/ER  3' for self joint mobilization gr I/II  Bent over scap retractions 5" hold 3x10  Prone scapular set 10 x 10"  Supine ER AAROM w/ wand @ 0 degrees and 45 30x5" holds    therapeutic activities to improve functional performance for 00  minutes, including:    Patient Education and Home Exercises       Education provided:   - Diagnosis and prognosis  - Post op precautions     Written Home Exercises Provided: yes. Exercises were reviewed and Farzad was able to demonstrate them prior to the end of the session.  Farzad demonstrated good  understanding of the education provided. See EMR under Patient Instructions for exercises provided during therapy sessions.    Assessment   [Protocol allows PROM flexion and ER to full. Moving in this direction. Able to compete prone scapular sets. Continue with gradual progression.     Farzad Is progressing well towards his goals.   Pt prognosis is Good.     Pt will continue to benefit from skilled outpatient physical therapy to address the deficits listed in the problem list box on initial evaluation, provide pt/family education and to maximize pt's level of independence in the home and community environment.     Pt's spiritual, cultural and educational needs considered and pt agreeable to plan of care and " goals.     Anticipated barriers to physical therapy: None    GOALS: Short Term Goals: 6 weeks  1.Report decreased shoulder pain < / =  1/10  to increase tolerance for exercise  2. Increase PROM full ROM  4. Pt to tolerate HEP to improve ROM and independence with ADL's     Long Term Goals: 12 to 24 weeks  1.Report decreased shoulder pain  < / =  0 /10  to increase tolerance for ADLs  2.Increase AROM to equal to the contralateral limb  3.Increase strength to >/= 4/5 in shoulder girdle to increase tolerance for ADL and work activities.  4. Pt goal: restore full function  5. Pt will have improved gcode of CJ (20-40% limited) on FOTO shoulder in order to demonstrate true functional improvement.     Plan     Plan of care Certification: 1/12/2024 to 5/31/2024.     Outpatient Physical Therapy 2 times weekly for 10 weeks to include the following interventions: manual therapy, therapeutic exercise, therapeutic activities, and neuromuscular re-education.     Otoniel Prasad

## 2024-02-08 ENCOUNTER — CLINICAL SUPPORT (OUTPATIENT)
Dept: REHABILITATION | Facility: HOSPITAL | Age: 49
End: 2024-02-08
Payer: COMMERCIAL

## 2024-02-08 DIAGNOSIS — Z98.890 S/P LEFT ROTATOR CUFF REPAIR: Primary | ICD-10-CM

## 2024-02-08 DIAGNOSIS — M25.512 ACUTE PAIN OF LEFT SHOULDER: Primary | ICD-10-CM

## 2024-02-08 PROCEDURE — 97140 MANUAL THERAPY 1/> REGIONS: CPT | Mod: CQ

## 2024-02-08 PROCEDURE — 97110 THERAPEUTIC EXERCISES: CPT | Mod: CQ

## 2024-02-08 PROCEDURE — 97112 NEUROMUSCULAR REEDUCATION: CPT | Mod: CQ

## 2024-02-08 RX ORDER — IBUPROFEN 800 MG/1
800 TABLET ORAL 3 TIMES DAILY
Qty: 90 TABLET | Refills: 1 | Status: SHIPPED | OUTPATIENT
Start: 2024-02-08 | End: 2024-04-08

## 2024-02-08 RX ORDER — METHOCARBAMOL 500 MG/1
500 TABLET, FILM COATED ORAL 4 TIMES DAILY
Qty: 40 TABLET | Refills: 0 | Status: SHIPPED | OUTPATIENT
Start: 2024-02-08 | End: 2024-02-18

## 2024-02-08 NOTE — TELEPHONE ENCOUNTER
Message Categories  Help  There are no categories on this message yet  Add categories    Surgery with Dr. Chaote Myochsner, System Message   Sent: Thu February 08, 2024 10:44 AM   To: Ana Lilia Ware   Surgery with Dr. Parra  (Newest Message First)  View All Conversations on this Encounter  Farzad Garcia  You7 minutes ago (10:44 AM)       Hello,   I'm not sure who to request refills or medication from, but I can I request some ibuprofen 800mg or something non-narcotic for pain, please?  Also, anything to help with these muscle knots and cramps in my back on my right side.  I got a massage gun, but it is still killing me.     Please send prescriptions to [facility]      Thanks please let me know if this can happen.

## 2024-02-08 NOTE — PROGRESS NOTES
[facility]   Physical Therapy Treatment Note      Name: Farzad Moore  Clinic Number: [id number]    Therapy Diagnosis:   Encounter Diagnosis   Name Primary?    Acute pain of left shoulder Yes         Physician: Kelly Trevino PA-C    Visit Date: 2/8/2024    Physician Orders: PT Eval and Treat   Medical Diagnosis from Referral:   S43.432D (ICD-10-CM) - Superior glenoid labrum lesion of left shoulder, subsequent encounter   M75.22 (ICD-10-CM) - Bicipital tendinitis of left shoulder      Evaluation Date: 1/12/2024  Authorization Period Expiration: 1/3/2025  Plan of Care Expiration: 5/31/2024  Progress Note Due: 2/22/2024  Visit # / Visits authorized: 6/ 20  FOTO: 1/3     Precautions: Standard,      OPERATION: 1/10/2024  Left  1. Shoulder arthroscopic rotator cuff repair (CPT 03989)  2. Shoulder open subpectoral biceps tenodesis (CPT 95619)  3. Shoulder arthroscopic extensive debridement (CPT 72030)    4. Shoulder arthroscopic subacromial decompression      Physical Therapy: Follow the < 3 cm cuff repair rehab protocol. PROM starts immediately given the patient's increased risk in this case for postoperative stiffness.. AROM starts after 6 weeks. No cuff resistive activity x 12 weeks. No biceps resistive activity x 8 weeks. Sling and pillow immobilization for 6 weeks.             PTA Visit #: 0/5     Time In: 0905  Time Out: 1000  Total Billable Time:55 minutes    Subjective     Pt reports: shoulder was sore yesterday.   He was compliant with home exercise program.  Response to previous treatment: Improved mobility  Functional change: NA    Pain: 1/10  Location: left shoulder      Objective      Objective Measures updated at progress report unless specified.     DOS: 1/10/2024  POW 4 weeks 1 days    Passive Range of Motion:   Shoulder Left   Flexion 110   Abduction 90   ER at 20 50   IR To stomach         Treatment     Farzad received the treatments listed below:      therapeutic exercises  "to develop ROM, strength, flexibility, endurance for 10 minutes including:   Standing ER AAROM wand at wall 2 x 10  Table slides flexion and abduction 3' ea     manual therapy techniques: joint mobilizations and/or soft tissue mobilizations were applied to the: shoulder for 15 minutes, including:  Shoulder PROM to protocol  GH joint mobilizations gr I/II    neuromuscular re-education activities to improve: motor control, balance, muscle activation, proprioception, posture for 30 minutes. The following activities were included:  Shoulder walk aways flex/ABD/ER  3' for self joint mobilization gr I/II  Bent over scap retractions 5" hold 3x10  Prone scapular set 30x 5" hold  Supine ER AAROM w/ wand @ 0 degrees and 45 30x5" holds    therapeutic activities to improve functional performance for 00  minutes, including:    Patient Education and Home Exercises       Education provided:   - Diagnosis and prognosis  - Post op precautions     Written Home Exercises Provided: yes. Exercises were reviewed and Farzad was able to demonstrate them prior to the end of the session.  Farzad demonstrated good  understanding of the education provided. See EMR under Patient Instructions for exercises provided during therapy sessions.    Assessment   [Protocol allows PROM flexion and ER to full. Good scapular motor control with scar retractions. ER is improving. Guarding with PROM FL.      Farzad Is progressing well towards his goals.   Pt prognosis is Good.     Pt will continue to benefit from skilled outpatient physical therapy to address the deficits listed in the problem list box on initial evaluation, provide pt/family education and to maximize pt's level of independence in the home and community environment.     Pt's spiritual, cultural and educational needs considered and pt agreeable to plan of care and goals.     Anticipated barriers to physical therapy: None    GOALS: Short Term Goals: 6 weeks  1.Report decreased shoulder pain " < / =  1/10  to increase tolerance for exercise  2. Increase PROM full ROM  4. Pt to tolerate HEP to improve ROM and independence with ADL's     Long Term Goals: 12 to 24 weeks  1.Report decreased shoulder pain  < / =  0 /10  to increase tolerance for ADLs  2.Increase AROM to equal to the contralateral limb  3.Increase strength to >/= 4/5 in shoulder girdle to increase tolerance for ADL and work activities.  4. Pt goal: restore full function  5. Pt will have improved gcode of CJ (20-40% limited) on FOTO shoulder in order to demonstrate true functional improvement.     Plan     Plan of care Certification: 1/12/2024 to 5/31/2024.     Outpatient Physical Therapy 2 times weekly for 10 weeks to include the following interventions: manual therapy, therapeutic exercise, therapeutic activities, and neuromuscular re-education.     Adrianne Palmer

## 2024-02-12 ENCOUNTER — CLINICAL SUPPORT (OUTPATIENT)
Dept: REHABILITATION | Facility: HOSPITAL | Age: 49
End: 2024-02-12
Payer: COMMERCIAL

## 2024-02-12 DIAGNOSIS — M25.512 ACUTE PAIN OF LEFT SHOULDER: Primary | ICD-10-CM

## 2024-02-12 PROCEDURE — 97530 THERAPEUTIC ACTIVITIES: CPT | Performed by: PHYSICAL THERAPIST

## 2024-02-12 PROCEDURE — 97140 MANUAL THERAPY 1/> REGIONS: CPT | Performed by: PHYSICAL THERAPIST

## 2024-02-12 PROCEDURE — 97110 THERAPEUTIC EXERCISES: CPT | Performed by: PHYSICAL THERAPIST

## 2024-02-12 PROCEDURE — 97112 NEUROMUSCULAR REEDUCATION: CPT | Performed by: PHYSICAL THERAPIST

## 2024-02-12 NOTE — PROGRESS NOTES
[facility]   Physical Therapy Treatment Note      Name: Farzad Moore  Clinic Number: [id number]    Therapy Diagnosis:   Encounter Diagnosis   Name Primary?    Acute pain of left shoulder Yes         Physician: Kelly Trevino PA-C    Visit Date: 2/12/2024    Physician Orders: PT Eval and Treat   Medical Diagnosis from Referral:   S43.432D (ICD-10-CM) - Superior glenoid labrum lesion of left shoulder, subsequent encounter   M75.22 (ICD-10-CM) - Bicipital tendinitis of left shoulder      Evaluation Date: 1/12/2024  Authorization Period Expiration: 1/3/2025  Plan of Care Expiration: 5/31/2024  Progress Note Due: 2/22/2024  Visit # / Visits authorized: 7/ 20  FOTO: 1/3     Precautions: Standard,      OPERATION: 1/10/2024  Left  1. Shoulder arthroscopic rotator cuff repair (CPT 65837)  2. Shoulder open subpectoral biceps tenodesis (CPT 56638)  3. Shoulder arthroscopic extensive debridement (CPT 78833)    4. Shoulder arthroscopic subacromial decompression      Physical Therapy: Follow the < 3 cm cuff repair rehab protocol. PROM starts immediately given the patient's increased risk in this case for postoperative stiffness.. AROM starts after 6 weeks. No cuff resistive activity x 12 weeks. No biceps resistive activity x 8 weeks. Sling and pillow immobilization for 6 weeks.             PTA Visit #: 0/5     Time In: 0900  Time Out: 0948  Total Billable Time:36 minutes    Subjective     Pt reports: Mild shoulder pain but nothing intense.   He was compliant with home exercise program.  Response to previous treatment: Improved mobility  Functional change: NA    Pain: 1/10  Location: left shoulder      Objective      Objective Measures updated at progress report unless specified.     DOS: 1/10/2024  POW 4 weeks 6 days    Passive Range of Motion:   Shoulder Left   Flexion 120   Abduction 90   ER at 20 50   IR To stomach         Treatment     Farzad received the treatments listed below:   "    therapeutic exercises to develop ROM, strength, flexibility, endurance for 8 minutes including:   Standing ER AAROM wand at wall 2 x 10  Supine ER AAROM with shoulder in 30 degrees ABD 3'     manual therapy techniques: joint mobilizations and/or soft tissue mobilizations were applied to the: shoulder for 10 minutes, including:  Shoulder PROM to protocol  GH joint mobilizations gr I/II  Scapular mobilizations IV, upward rotation and posterior tilt    neuromuscular re-education activities to improve: motor control, balance, muscle activation, proprioception, posture for 10 minutes. The following activities were included:  Shoulder walk aways flex/ABD/ER  3' for self joint mobilization gr I/II  Prone scapular set 30x 5" hold  Table slide for unloaded Flexion and scaption AAROM 3'      therapeutic activities to improve functional performance for 08  minutes, including:  Supine short lever flexion to 10 to 80 degrees 2 x 10      Patient Education and Home Exercises       Education provided:   - Diagnosis and prognosis  - Post op precautions     Written Home Exercises Provided: yes. Exercises were reviewed and Farzad was able to demonstrate them prior to the end of the session.  Farzad demonstrated good  understanding of the education provided. See EMR under Patient Instructions for exercises provided during therapy sessions.    Assessment   Continues to progress well. Focus should be on gradually improving passive ROM and unloaded AAROM to full ROM. Scapular exercises are going well with minimal issue. Will be ready to come out of the sling as per MD orders at 6 weeks post op.     Farzad Is progressing well towards his goals.   Pt prognosis is Good.     Pt will continue to benefit from skilled outpatient physical therapy to address the deficits listed in the problem list box on initial evaluation, provide pt/family education and to maximize pt's level of independence in the home and community environment.     Pt's " spiritual, cultural and educational needs considered and pt agreeable to plan of care and goals.     Anticipated barriers to physical therapy: None    GOALS: Short Term Goals: 6 weeks  1.Report decreased shoulder pain < / =  1/10  to increase tolerance for exercise  2. Increase PROM full ROM  4. Pt to tolerate HEP to improve ROM and independence with ADL's     Long Term Goals: 12 to 24 weeks  1.Report decreased shoulder pain  < / =  0 /10  to increase tolerance for ADLs  2.Increase AROM to equal to the contralateral limb  3.Increase strength to >/= 4/5 in shoulder girdle to increase tolerance for ADL and work activities.  4. Pt goal: restore full function  5. Pt will have improved gcode of CJ (20-40% limited) on FOTO shoulder in order to demonstrate true functional improvement.     Plan     Plan of care Certification: 1/12/2024 to 5/31/2024.     Outpatient Physical Therapy 2 times weekly for 10 weeks to include the following interventions: manual therapy, therapeutic exercise, therapeutic activities, and neuromuscular re-education.     Otoniel Parsad

## 2024-02-15 ENCOUNTER — CLINICAL SUPPORT (OUTPATIENT)
Dept: REHABILITATION | Facility: HOSPITAL | Age: 49
End: 2024-02-15
Payer: COMMERCIAL

## 2024-02-15 DIAGNOSIS — M25.512 ACUTE PAIN OF LEFT SHOULDER: Primary | ICD-10-CM

## 2024-02-15 PROCEDURE — 97530 THERAPEUTIC ACTIVITIES: CPT | Mod: CQ

## 2024-02-15 PROCEDURE — 97110 THERAPEUTIC EXERCISES: CPT | Mod: CQ

## 2024-02-15 PROCEDURE — 97112 NEUROMUSCULAR REEDUCATION: CPT | Mod: CQ

## 2024-02-15 PROCEDURE — 97140 MANUAL THERAPY 1/> REGIONS: CPT | Mod: CQ

## 2024-02-15 NOTE — PROGRESS NOTES
[facility]   Physical Therapy Treatment Note      Name: Farzad Moore  Clinic Number: [id number]    Therapy Diagnosis:   Encounter Diagnosis   Name Primary?    Acute pain of left shoulder Yes         Physician: Kelly Trevino PA-C    Visit Date: 2/15/2024    Physician Orders: PT Eval and Treat   Medical Diagnosis from Referral:   S43.432D (ICD-10-CM) - Superior glenoid labrum lesion of left shoulder, subsequent encounter   M75.22 (ICD-10-CM) - Bicipital tendinitis of left shoulder      Evaluation Date: 1/12/2024  Authorization Period Expiration: 1/3/2025  Plan of Care Expiration: 5/31/2024  Progress Note Due: 2/22/2024  Visit # / Visits authorized: 8/ 20  FOTO: 1/3     Precautions: Standard,      OPERATION: 1/10/2024  Left  1. Shoulder arthroscopic rotator cuff repair (CPT 44365)  2. Shoulder open subpectoral biceps tenodesis (CPT 63596)  3. Shoulder arthroscopic extensive debridement (CPT 70683)    4. Shoulder arthroscopic subacromial decompression      Physical Therapy: Follow the < 3 cm cuff repair rehab protocol. PROM starts immediately given the patient's increased risk in this case for postoperative stiffness.. AROM starts after 6 weeks. No cuff resistive activity x 12 weeks. No biceps resistive activity x 8 weeks. Sling and pillow immobilization for 6 weeks.             PTA Visit #: 0/5     Time In: 0900  Time Out: 0953  Total Billable Time:53 minutes (PT tech extender used this session)    Subjective     Pt reports: shoulder is tight in the morning  He was compliant with home exercise program.  Response to previous treatment: Improved mobility  Functional change: NA    Pain: 1/10  Location: left shoulder      Objective      Objective Measures updated at progress report unless specified.     DOS: 1/10/2024  POW 4 weeks 6 days    Passive Range of Motion:   Shoulder Left   Flexion 120   Abduction 90   ER at 20 50   IR To stomach         Treatment     Farzad received the treatments  "listed below:      therapeutic exercises to develop ROM, strength, flexibility, endurance for 15 minutes including:   Standing ER AAROM wand at wall 2 x 10  Supine ER AAROM with shoulder in 30 degrees ABD 3'   Wand FL 30x 5" hold    manual therapy techniques: joint mobilizations and/or soft tissue mobilizations were applied to the: shoulder for 10 minutes, including:  Shoulder PROM to protocol  GH joint mobilizations gr I/II  Scapular mobilizations IV, upward rotation and posterior tilt    neuromuscular re-education activities to improve: motor control, balance, muscle activation, proprioception, posture for 15 minutes. The following activities were included:  Shoulder walk aways flex/ABD/ER  3' for self joint mobilization gr I/II  Prone scapular set 30x 5" hold  Table slide for unloaded Flexion and scaption AAROM 3'      therapeutic activities to improve functional performance for 10  minutes, including:  Supine short lever flexion to 10 to 80 degrees 2 x 10  Bicep curl #0 30x    Patient Education and Home Exercises       Education provided:   - Diagnosis and prognosis  - Post op precautions     Written Home Exercises Provided: yes. Exercises were reviewed and Farzad was able to demonstrate them prior to the end of the session.  Farzad demonstrated good  understanding of the education provided. See EMR under Patient Instructions for exercises provided during therapy sessions.    Assessment   Suggested to pt to increase frequency of ER AAROM exercise as he is still pretty limited in this motion. States it is hard with being back at work. Pt was given suggestion of how he can accomplish this at work. Pt is 5 weeks post op this week and will be able to come out of sling next week.     Farzad Is progressing well towards his goals.   Pt prognosis is Good.     Pt will continue to benefit from skilled outpatient physical therapy to address the deficits listed in the problem list box on initial evaluation, provide " pt/family education and to maximize pt's level of independence in the home and community environment.     Pt's spiritual, cultural and educational needs considered and pt agreeable to plan of care and goals.     Anticipated barriers to physical therapy: None    GOALS: Short Term Goals: 6 weeks  1.Report decreased shoulder pain < / =  1/10  to increase tolerance for exercise  2. Increase PROM full ROM  4. Pt to tolerate HEP to improve ROM and independence with ADL's     Long Term Goals: 12 to 24 weeks  1.Report decreased shoulder pain  < / =  0 /10  to increase tolerance for ADLs  2.Increase AROM to equal to the contralateral limb  3.Increase strength to >/= 4/5 in shoulder girdle to increase tolerance for ADL and work activities.  4. Pt goal: restore full function  5. Pt will have improved gcode of CJ (20-40% limited) on FOTO shoulder in order to demonstrate true functional improvement.     Plan     Plan of care Certification: 1/12/2024 to 5/31/2024.     Outpatient Physical Therapy 2 times weekly for 10 weeks to include the following interventions: manual therapy, therapeutic exercise, therapeutic activities, and neuromuscular re-education.     Adrianne Palmer

## 2024-02-19 ENCOUNTER — PATIENT MESSAGE (OUTPATIENT)
Dept: SPORTS MEDICINE | Facility: CLINIC | Age: 49
End: 2024-02-19
Payer: COMMERCIAL

## 2024-02-19 ENCOUNTER — CLINICAL SUPPORT (OUTPATIENT)
Dept: REHABILITATION | Facility: HOSPITAL | Age: 49
End: 2024-02-19
Payer: COMMERCIAL

## 2024-02-19 DIAGNOSIS — M25.512 ACUTE PAIN OF LEFT SHOULDER: Primary | ICD-10-CM

## 2024-02-19 PROCEDURE — 97110 THERAPEUTIC EXERCISES: CPT | Performed by: PHYSICAL THERAPIST

## 2024-02-19 PROCEDURE — 97140 MANUAL THERAPY 1/> REGIONS: CPT | Performed by: PHYSICAL THERAPIST

## 2024-02-19 PROCEDURE — 97112 NEUROMUSCULAR REEDUCATION: CPT | Performed by: PHYSICAL THERAPIST

## 2024-02-19 NOTE — PROGRESS NOTES
[facility]   Physical Therapy Treatment Note      Name: Farzad Moore  [facility] Number: [id number]    Therapy Diagnosis:   Encounter Diagnosis   Name Primary?    Acute pain of left shoulder Yes         Physician: Kelly Tervino PA-C    Visit Date: 2/19/2024    Physician Orders: PT Eval and Treat   Medical Diagnosis from Referral:   S43.432D (ICD-10-CM) - Superior glenoid labrum lesion of left shoulder, subsequent encounter   M75.22 (ICD-10-CM) - Bicipital tendinitis of left shoulder      Evaluation Date: 1/12/2024  Authorization Period Expiration: 1/3/2025  Plan of Care Expiration: 5/31/2024  Progress Note Due: 2/22/2024  Visit # / Visits authorized: 9/ 20  FOTO: 1/3     Precautions: Standard,      OPERATION: 1/10/2024  Left  1. Shoulder arthroscopic rotator cuff repair (CPT 00887)  2. Shoulder open subpectoral biceps tenodesis (CPT 83265)  3. Shoulder arthroscopic extensive debridement (CPT 72061)    4. Shoulder arthroscopic subacromial decompression      Physical Therapy: Follow the < 3 cm cuff repair rehab protocol. PROM starts immediately given the patient's increased risk in this case for postoperative stiffness.. AROM starts after 6 weeks. No cuff resistive activity x 12 weeks. No biceps resistive activity x 8 weeks. Sling and pillow immobilization for 6 weeks.             PTA Visit #: 0/5     Time In: 0901  Time Out: 1003  Total Billable Time: 45 minutes (PT tech extender used this session)    Subjective     Pt reports: Had more shoulder pain following the previous session. Thinks he may have over done it. Was having increased soreness over the weekend. Feeling better today.  He was compliant with home exercise program.  Response to previous treatment: Improved mobility  Functional change: NA    Pain: 1/10  Location: left shoulder      Objective      Objective Measures updated at progress report unless specified.     DOS: 1/10/2024  POW 5 weeks 5 days    Passive Range of Motion:  "  Shoulder Left   Flexion 12   Abduction 90   ER at 20 50   IR 20         Treatment     Farzad received the treatments listed below:      therapeutic exercises to develop ROM, strength, flexibility, endurance for 10 minutes including:  Standing ER AAROM wand at wall 2 x 10  Supine ER AAROM with shoulder in 30 degrees ABD 3'      manual therapy techniques: joint mobilizations and/or soft tissue mobilizations were applied to the: shoulder for 10 minutes, including:  Shoulder PROM to protocol  GH joint mobilizations gr I/II  Scapular mobilizations IV, upward rotation and posterior tilt    neuromuscular re-education activities to improve: motor control, balance, muscle activation, proprioception, posture for 25 minutes. The following activities were included:  Shoulder walk aways flex/ABD/ER  3' for self joint mobilization gr I/II  Prone scapular set 30x 5" hold  Table slide for unloaded Flexion and scaption AAROM 3'  Swiss ball AAROM shoulder flexion mat incline 3'      therapeutic activities to improve functional performance for 00  minutes, including:        Patient Education and Home Exercises       Education provided:   - Diagnosis and prognosis  - Post op precautions     Written Home Exercises Provided: yes. Exercises were reviewed and Farzad was able to demonstrate them prior to the end of the session.  Farzad demonstrated good  understanding of the education provided. See EMR under Patient Instructions for exercises provided during therapy sessions.    Assessment   Had some increase in soreness over the weekend. Likely due to gradual increase in shoulder loading to prepare him to come out of the sling. Reduced intensity of exercise today and he felt better at the conclusion of treatment. Should be ready to come out of the sling at his 6 week post op appointment.    Farzad Is progressing well towards his goals.   Pt prognosis is Good.     Pt will continue to benefit from skilled outpatient physical therapy to " address the deficits listed in the problem list box on initial evaluation, provide pt/family education and to maximize pt's level of independence in the home and community environment.     Pt's spiritual, cultural and educational needs considered and pt agreeable to plan of care and goals.     Anticipated barriers to physical therapy: None    GOALS: Short Term Goals: 6 weeks (met)  1.Report decreased shoulder pain < / =  1/10  to increase tolerance for exercise  2. Increase PROM full ROM  4. Pt to tolerate HEP to improve ROM and independence with ADL's     Long Term Goals: 12 to 24 weeks  1.Report decreased shoulder pain  < / =  0 /10  to increase tolerance for ADLs (in progress, not met)  2.Increase AROM to equal to the contralateral limb (in progress, not met)  3.Increase strength to >/= 4/5 in shoulder girdle to increase tolerance for ADL and work activities. (in progress, not met)  4. Pt goal: restore full function (in progress, not met)  5. Pt will have improved gcode of CJ (20-40% limited) on FOTO shoulder in order to demonstrate true functional improvement. (in progress, not met)    Plan     Plan of care Certification: 1/12/2024 to 5/31/2024.     Outpatient Physical Therapy 2 times weekly for 10 weeks to include the following interventions: manual therapy, therapeutic exercise, therapeutic activities, and neuromuscular re-education.     Otoniel Prasad PT, DPT

## 2024-02-20 ENCOUNTER — TELEPHONE (OUTPATIENT)
Dept: SPORTS MEDICINE | Facility: CLINIC | Age: 49
End: 2024-02-20
Payer: COMMERCIAL

## 2024-02-20 NOTE — PROGRESS NOTES
"S:Farzad Moore presents for post-operative evaluation.     DATE OF PROCEDURE: 1/10/2024   OPERATION:   Left  1. Shoulder arthroscopic rotator cuff repair (CPT 70935)  2. Shoulder open subpectoral biceps tenodesis (CPT 26011)  3. Shoulder arthroscopic extensive debridement (CPT 80109)    4. Shoulder arthroscopic subacromial decompression      Farzad Moore reports to be doing well 6 weeks 1 day s/p the above mentioned procedure. He was evaluated at an external urgent care 02/20/24 due to lateral portal wound concern. He was prescribed doxycycline. He reports he initially had his sutures removed early and then had the steri-strips replaced at that time and has had them up until a few days ago. Still presents with a steri-strip today at the posterior portal.  Denies fevers, chills, night sweats, chest pain, difficulty breathing, calf pain or tenderness. Continues PT at [place] location with Otoniel. Seeing good progress daily. Pain levels are improving and in fact he states that the shoulder feels "night and day" better compared to preop. Denies any pain at rest, denies any pain with passive range of motion throughout his arc of motion.    O:  Exam of the left shoulder shows the anterolateral portal is having an abrasion type appearance.  Perhaps skin irritation from the retained Steri-Strips for weeks.  No surrounding signs of cellulitis or infection.  No evidence of abscess.  Nontender.  No open sinus or drainage.  The remainder of the arthroscopic portals are healed.  Active forward elevation to 90, passive to 140° with relative ease.  No excessive stiffness.  Passive external rotation with arm at side to 50°.  Opens up well.  Motor and sensory intact to the left hand.    A/P: Arthroscopic pictures were reviewed with the patient.  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  Discussed the significance of that as it relates to increased risk for continued or recurrent pain/stiffness.  I do expect him " to do well and he states the shoulder feels much better compared to preop.  May begin the active phase of recovery.  Wean out of sling and pillow.  No lifting more than a coke can or small book.  Strengthening phase starts in 6 week.  Advised against doing too much too soon.  In regards to his area of skin irritation, this appears to be more contact from his Steri-Strips and like an abrasion.  May finish out the doxycycline if he wants but I am less concerned regarding infection.  Return to clinic-in 6 weeks.

## 2024-02-20 NOTE — TELEPHONE ENCOUNTER
Spoke c pt. Called for update following outside UC visit earlier today. He stated clinician was not concerned about pt's PO wound but did prescribe him doxycycline. He stated that he did not have a fever when evaluated today. He states he may have overreacted due to symptoms that he described as  fatigue, cough, & general malaise, he is glad he went to UC today for reassurance. He will bring rx for Dr. Farris to review at appt on 02/22. Confirmed that he can go to PT at 0900 before appt c Dr. Farris at 0945. Pt will call c additional questions/concerns in interim. Pt expressed understanding & was thankful.

## 2024-02-20 NOTE — TELEPHONE ENCOUNTER
Surgery with Dr. Parra  (Newest Message First)  View All Conversations on this Encounter  Farzad Moore  You38 minutes ago (11:03 AM)       Sorry. I was in traffic [place]. I was actually going to an urgent care after this.  I don't know if it  is related.  I've been feeling sick, congested, lethargic, my shoulder has been hurting worse.  This past weekend I did not get out of bed Saturday and Sunday cause of shoulder pain. I did ice then heat pad all weekend.   That wound has been bleeding out I'm guessing at least week.  I really don't know.  I noticed blood around the tape last week but didn't think nothing of it.  I just thought it was from when the stitches came out.  Then yesterday in PT I noticed a blood stain through my yellow shirt that was real noticeable.  To me it does not look good, but I have no idea if that's real bad.   I'm gonna head over to urgent care now.

## 2024-02-20 NOTE — TELEPHONE ENCOUNTER
Spoke c pt. He was just walking into an . Informed him I will f/u c him later today. He has routine 6w PO appt scheduled on Thursday c Dr. Farris. Confirmed appt date, time, location. Pt will call c additional questions/concerns in interim. Pt expressed understanding & was thankful.

## 2024-02-22 ENCOUNTER — CLINICAL SUPPORT (OUTPATIENT)
Dept: REHABILITATION | Facility: HOSPITAL | Age: 49
End: 2024-02-22
Payer: COMMERCIAL

## 2024-02-22 ENCOUNTER — OFFICE VISIT (OUTPATIENT)
Dept: SPORTS MEDICINE | Facility: CLINIC | Age: 49
End: 2024-02-22
Payer: COMMERCIAL

## 2024-02-22 VITALS
HEART RATE: 79 BPM | HEIGHT: 77 IN | DIASTOLIC BLOOD PRESSURE: 87 MMHG | BODY MASS INDEX: 27.58 KG/M2 | WEIGHT: 233.56 LBS | SYSTOLIC BLOOD PRESSURE: 122 MMHG

## 2024-02-22 DIAGNOSIS — Z98.890 S/P LEFT ROTATOR CUFF REPAIR: Primary | ICD-10-CM

## 2024-02-22 DIAGNOSIS — M25.512 ACUTE PAIN OF LEFT SHOULDER: Primary | ICD-10-CM

## 2024-02-22 PROCEDURE — 3079F DIAST BP 80-89 MM HG: CPT | Mod: CPTII,S$GLB,, | Performed by: ORTHOPAEDIC SURGERY

## 2024-02-22 PROCEDURE — 97112 NEUROMUSCULAR REEDUCATION: CPT | Mod: CQ

## 2024-02-22 PROCEDURE — 3074F SYST BP LT 130 MM HG: CPT | Mod: CPTII,S$GLB,, | Performed by: ORTHOPAEDIC SURGERY

## 2024-02-22 PROCEDURE — 1159F MED LIST DOCD IN RCRD: CPT | Mod: CPTII,S$GLB,, | Performed by: ORTHOPAEDIC SURGERY

## 2024-02-22 PROCEDURE — 97110 THERAPEUTIC EXERCISES: CPT | Mod: CQ

## 2024-02-22 PROCEDURE — 99999 PR PBB SHADOW E&M-EST. PATIENT-LVL III: CPT | Mod: PBBFAC,,, | Performed by: ORTHOPAEDIC SURGERY

## 2024-02-22 PROCEDURE — 99024 POSTOP FOLLOW-UP VISIT: CPT | Mod: S$GLB,,, | Performed by: ORTHOPAEDIC SURGERY

## 2024-02-22 PROCEDURE — 97140 MANUAL THERAPY 1/> REGIONS: CPT | Mod: CQ

## 2024-02-22 RX ORDER — DOXYCYCLINE 100 MG/1
1 CAPSULE ORAL 2 TIMES DAILY
COMMUNITY
End: 2024-04-04

## 2024-02-22 NOTE — PROGRESS NOTES
[facility]   Physical Therapy Treatment Note      Name: Farzad Moore  Clinic Number: [id number]    Therapy Diagnosis:   Encounter Diagnosis   Name Primary?    Acute pain of left shoulder Yes         Physician: Kelly Trevino PA-C    Visit Date: 2/22/2024    Physician Orders: PT Eval and Treat   Medical Diagnosis from Referral:   S43.432D (ICD-10-CM) - Superior glenoid labrum lesion of left shoulder, subsequent encounter   M75.22 (ICD-10-CM) - Bicipital tendinitis of left shoulder      Evaluation Date: 1/12/2024  Authorization Period Expiration: 1/3/2025  Plan of Care Expiration: 5/31/2024  Progress Note Due: 2/22/2024  Visit # / Visits authorized: 9/ 20  FOTO: 1/3     Precautions: Standard,      OPERATION: 1/10/2024  Left  1. Shoulder arthroscopic rotator cuff repair (CPT 16141)  2. Shoulder open subpectoral biceps tenodesis (CPT 87780)  3. Shoulder arthroscopic extensive debridement (CPT 35830)    4. Shoulder arthroscopic subacromial decompression      Physical Therapy: Follow the < 3 cm cuff repair rehab protocol. PROM starts immediately given the patient's increased risk in this case for postoperative stiffness.. AROM starts after 6 weeks. No cuff resistive activity x 12 weeks. No biceps resistive activity x 8 weeks. Sling and pillow immobilization for 6 weeks.             PTA Visit #: 0/5     Time In: 0908  Time Out: 0943  Total Billable Time: 35 minutes     Subjective     Pt reports: Have an incision that is not closing. On antibiotics. Have appt with MD today  He was compliant with home exercise program.  Response to previous treatment: Improved mobility  Functional change: NA    Pain: 1/10  Location: left shoulder      Objective      Objective Measures updated at progress report unless specified.     DOS: 1/10/2024  POW 5 weeks 5 days    Passive Range of Motion:   Shoulder Left   Flexion 135   Abduction 90-NT   ER at 20 70   IR 20-NT         Treatment     Farzad received the  "treatments listed below:      therapeutic exercises to develop ROM, strength, flexibility, endurance for 10 minutes including:  Standing ER AAROM wand at wall 2 x 10  Supine ER AAROM with shoulder in 30 degrees ABD 3'  Pulley scaption x 4 min    manual therapy techniques: joint mobilizations and/or soft tissue mobilizations were applied to the: shoulder for 10 minutes, including:  Performed by PT Otoniel Prasad:  Shoulder PROM to protocol  GH joint mobilizations gr I/II-np  Scapular mobilizations IV, upward rotation and posterior tilt-np  Wound/ROM assessment    neuromuscular re-education activities to improve: motor control, balance, muscle activation, proprioception, posture for 15 minutes. The following activities were included:  Shoulder walk aways flex 3' for self joint mobilization gr I/II  Prone scapular set 30x 5" hold    Np:  Table slide for unloaded Flexion and scaption AAROM 3'  Swiss ball AAROM shoulder flexion mat incline 3'      therapeutic activities to improve functional performance for 00  minutes, including:        Patient Education and Home Exercises       Education provided:   - Diagnosis and prognosis  - Post op precautions     Written Home Exercises Provided: yes. Exercises were reviewed and Farzad was able to demonstrate them prior to the end of the session.  Farzad demonstrated good  understanding of the education provided. See EMR under Patient Instructions for exercises provided during therapy sessions.    Assessment   Showing good improvement in ROM at 6 wks. Wound was assessed and there is bloody drainage from wound with red pigment surrounding port site. Session shortened 2* MD visit. Session focused on mobility today.    Farzad Is progressing well towards his goals.   Pt prognosis is Good.     Pt will continue to benefit from skilled outpatient physical therapy to address the deficits listed in the problem list box on initial evaluation, provide pt/family education and to maximize pt's " level of independence in the home and community environment.     Pt's spiritual, cultural and educational needs considered and pt agreeable to plan of care and goals.     Anticipated barriers to physical therapy: None    GOALS: Short Term Goals: 6 weeks (met)  1.Report decreased shoulder pain < / =  1/10  to increase tolerance for exercise  2. Increase PROM full ROM  4. Pt to tolerate HEP to improve ROM and independence with ADL's     Long Term Goals: 12 to 24 weeks  1.Report decreased shoulder pain  < / =  0 /10  to increase tolerance for ADLs (in progress, not met)  2.Increase AROM to equal to the contralateral limb (in progress, not met)  3.Increase strength to >/= 4/5 in shoulder girdle to increase tolerance for ADL and work activities. (in progress, not met)  4. Pt goal: restore full function (in progress, not met)  5. Pt will have improved gcode of CJ (20-40% limited) on FOTO shoulder in order to demonstrate true functional improvement. (in progress, not met)    Plan     Plan of care Certification: 1/12/2024 to 5/31/2024.     Outpatient Physical Therapy 2 times weekly for 10 weeks to include the following interventions: manual therapy, therapeutic exercise, therapeutic activities, and neuromuscular re-education.     Adrianne Palmer PTA

## 2024-02-29 ENCOUNTER — CLINICAL SUPPORT (OUTPATIENT)
Dept: REHABILITATION | Facility: HOSPITAL | Age: 49
End: 2024-02-29
Payer: COMMERCIAL

## 2024-02-29 DIAGNOSIS — M25.512 ACUTE PAIN OF LEFT SHOULDER: Primary | ICD-10-CM

## 2024-02-29 DIAGNOSIS — Z98.890 S/P LEFT ROTATOR CUFF REPAIR: Primary | ICD-10-CM

## 2024-02-29 PROCEDURE — 97112 NEUROMUSCULAR REEDUCATION: CPT | Mod: CQ

## 2024-02-29 PROCEDURE — 97110 THERAPEUTIC EXERCISES: CPT | Mod: CQ

## 2024-02-29 PROCEDURE — 97140 MANUAL THERAPY 1/> REGIONS: CPT | Mod: CQ

## 2024-02-29 RX ORDER — CELECOXIB 200 MG/1
200 CAPSULE ORAL 2 TIMES DAILY
Qty: 56 CAPSULE | Refills: 1 | Status: SHIPPED | OUTPATIENT
Start: 2024-02-29 | End: 2024-04-01 | Stop reason: SDUPTHER

## 2024-02-29 NOTE — PROGRESS NOTES
[facility]   Physical Therapy Treatment Note      Name: Farzad Moore  [facility] Number: [id number]    Therapy Diagnosis:   Encounter Diagnosis   Name Primary?    Acute pain of left shoulder Yes         Physician: Kelly Trevino PA-C    Visit Date: 2/29/2024    Physician Orders: PT Eval and Treat   Medical Diagnosis from Referral:   S43.432D (ICD-10-CM) - Superior glenoid labrum lesion of left shoulder, subsequent encounter   M75.22 (ICD-10-CM) - Bicipital tendinitis of left shoulder      Evaluation Date: 1/12/2024  Authorization Period Expiration: 1/3/2025  Plan of Care Expiration: 5/31/2024  Progress Note Due: 2/22/2024  Visit # / Visits authorized: 11/ 20  FOTO: 1/3     Precautions: Standard,      OPERATION: 1/10/2024  Left  1. Shoulder arthroscopic rotator cuff repair (CPT 33691)  2. Shoulder open subpectoral biceps tenodesis (CPT 40599)  3. Shoulder arthroscopic extensive debridement (CPT 51111)    4. Shoulder arthroscopic subacromial decompression      Physical Therapy: Follow the < 3 cm cuff repair rehab protocol. PROM starts immediately given the patient's increased risk in this case for postoperative stiffness.. AROM starts after 6 weeks. No cuff resistive activity x 12 weeks. No biceps resistive activity x 8 weeks. Sling and pillow immobilization for 6 weeks.             PTA Visit #: 0/5     Time In: 0900  Time Out: 1010  Total Billable Time: 54 minutes     Subjective     Pt reports: shoulder got sore out of sling  He was compliant with home exercise program.  Response to previous treatment: Improved mobility  Functional change: NA    Pain: 1/10  Location: left shoulder      Objective      Objective Measures updated at progress report unless specified.     DOS: 1/10/2024  POW 5 weeks 5 days    Passive Range of Motion:   Shoulder Left   Flexion 135   Abduction 90-NT   ER at 20 70   IR 20-NT         Treatment     Farzad received the treatments listed below:      therapeutic  "exercises to develop ROM, strength, flexibility, endurance for 27 minutes including:  UBE 5/5 no resistance  Supine wand chest press into FL  Supine ER AAROM with shoulder in 45 and 90 degrees ABD 3'  Pulley FL/scaption x 4 min    manual therapy techniques: joint mobilizations and/or soft tissue mobilizations were applied to the: shoulder for 10 minutes, including:  Performed by PT Otoniel Prasad:  Shoulder PROM to protocol  GH joint mobilizations gr I/II-np  Scapular mobilizations IV, upward rotation and posterior tilt-np  Wound/ROM assessment    neuromuscular re-education activities to improve: motor control, balance, muscle activation, proprioception, posture for 23 minutes. The following activities were included:  Prone Row 2x12 5" hold  Prone shoulder ext 2x12 5" hold  Wall isometrics IR/ER   Landmine 3x10    Np:  Table slide for unloaded Flexion and scaption AAROM 3'  Swiss ball AAROM shoulder flexion mat incline 3'      therapeutic activities to improve functional performance for 00  minutes, including:        Patient Education and Home Exercises       Education provided:   Sling weaning      Written Home Exercises Provided: yes. Exercises were reviewed and Farzad was able to demonstrate them prior to the end of the session.  Farzad demonstrated good  understanding of the education provided. See EMR under Patient Instructions for exercises provided during therapy sessions.    Assessment   Wound is healing well at this time. ER is improving in scapiton and 90. Superior shoulder pain with FL which improved with manual scapular upward rotation. FL with empty end feel. Good neuromuscular control with prone scapular interventions. Pt tolerated progression during tx will assess next session soreness level.    Farzad Is progressing well towards his goals.   Pt prognosis is Good.     Pt will continue to benefit from skilled outpatient physical therapy to address the deficits listed in the problem list box on initial " evaluation, provide pt/family education and to maximize pt's level of independence in the home and community environment.     Pt's spiritual, cultural and educational needs considered and pt agreeable to plan of care and goals.     Anticipated barriers to physical therapy: None    GOALS: Short Term Goals: 6 weeks (met)  1.Report decreased shoulder pain < / =  1/10  to increase tolerance for exercise  2. Increase PROM full ROM  4. Pt to tolerate HEP to improve ROM and independence with ADL's     Long Term Goals: 12 to 24 weeks  1.Report decreased shoulder pain  < / =  0 /10  to increase tolerance for ADLs (in progress, not met)  2.Increase AROM to equal to the contralateral limb (in progress, not met)  3.Increase strength to >/= 4/5 in shoulder girdle to increase tolerance for ADL and work activities. (in progress, not met)  4. Pt goal: restore full function (in progress, not met)  5. Pt will have improved gcode of CJ (20-40% limited) on FOTO shoulder in order to demonstrate true functional improvement. (in progress, not met)    Plan     Plan of care Certification: 1/12/2024 to 5/31/2024.     Outpatient Physical Therapy 2 times weekly for 10 weeks to include the following interventions: manual therapy, therapeutic exercise, therapeutic activities, and neuromuscular re-education.     Adrianne Palmer PTA

## 2024-03-04 ENCOUNTER — CLINICAL SUPPORT (OUTPATIENT)
Dept: REHABILITATION | Facility: HOSPITAL | Age: 49
End: 2024-03-04
Payer: COMMERCIAL

## 2024-03-04 DIAGNOSIS — M25.512 ACUTE PAIN OF LEFT SHOULDER: Primary | ICD-10-CM

## 2024-03-04 PROCEDURE — 97112 NEUROMUSCULAR REEDUCATION: CPT | Performed by: PHYSICAL THERAPIST

## 2024-03-04 PROCEDURE — 97530 THERAPEUTIC ACTIVITIES: CPT | Performed by: PHYSICAL THERAPIST

## 2024-03-04 PROCEDURE — 97140 MANUAL THERAPY 1/> REGIONS: CPT | Performed by: PHYSICAL THERAPIST

## 2024-03-04 NOTE — PROGRESS NOTES
[facility]   Physical Therapy Treatment Note      Name: Farzad Moore  [facility] Number: [id number]    Therapy Diagnosis:   Encounter Diagnosis   Name Primary?    Acute pain of left shoulder Yes         Physician: Kelly Trevino PA-C    Visit Date: 2/29/2024    Physician Orders: PT Eval and Treat   Medical Diagnosis from Referral:   S43.432D (ICD-10-CM) - Superior glenoid labrum lesion of left shoulder, subsequent encounter   M75.22 (ICD-10-CM) - Bicipital tendinitis of left shoulder      Evaluation Date: 1/12/2024  Authorization Period Expiration: 1/3/2025  Plan of Care Expiration: 5/31/2024  Progress Note Due: 2/22/2024  Visit # / Visits authorized: 12/ 20  FOTO: 1/3     Precautions: Standard,      OPERATION: 1/10/2024  Left  1. Shoulder arthroscopic rotator cuff repair (CPT 25696)  2. Shoulder open subpectoral biceps tenodesis (CPT 79640)  3. Shoulder arthroscopic extensive debridement (CPT 89983)    4. Shoulder arthroscopic subacromial decompression      Physical Therapy: Follow the < 3 cm cuff repair rehab protocol. PROM starts immediately given the patient's increased risk in this case for postoperative stiffness.. AROM starts after 6 weeks. No cuff resistive activity x 12 weeks. No biceps resistive activity x 8 weeks. Sling and pillow immobilization for 6 weeks.             PTA Visit #: 0/5     Time In: 0915  Time Out: 1025  Total Billable Time: 42 minutes     Subjective     Pt reports: Having most soreness when sleeping at night. Able to work on his exercises at home, ordered pulleys on [employer]. Mostly out of his sling.  He was compliant with home exercise program.  Response to previous treatment: Improved mobility  Functional change: NA    Pain: 1/10  Location: left shoulder      Objective      Objective Measures updated at progress report unless specified.     DOS: 1/10/2024  POW 7 weeks 6 day as of 3/4/2024    Passive Range of Motion:   Shoulder Left   Flexion 135   Abduction  "90-NT   ER at 20 70   IR 20-NT         Treatment     Farzad received the treatments listed below:      therapeutic exercises to develop ROM, strength, flexibility, endurance for 5' minutes includin ABD shoulder prop 3 lbs 5'  UBE 5/5 no resistance      manual therapy techniques: joint mobilizations and/or soft tissue mobilizations were applied to the: shoulder for 10 minutes, including:  Shoulder PROM to protocol  GH joint mobilizations gr I/II-np  Scapular mobilizations IV, upward rotation and posterior tilt-np  Wound/ROM assessment    neuromuscular re-education activities to improve: motor control, balance, muscle activation, proprioception, posture for 24 minutes. The following activities were included:  Rhythmic stabilization   Therapist assisted shoulder flexion through challenging ranges.  Supine short lever flexion, short to long lever flexion, SL flexion with dowel. 2 x 10 ea  No moneys (no resistance) 2 x 15    Prone Row 2x12 5" hold  Prone shoulder ext 2x12 5" hold  Wall isometrics IR/ER - NP  Landmine 3x10 - NP      therapeutic activities to improve functional performance for 8  minutes, including:  Wall slide 2 x 10 to for functional reach training.      Patient Education and Home Exercises       Education provided:   Sling weaning      Written Home Exercises Provided: yes. Exercises were reviewed and Farzad was able to demonstrate them prior to the end of the session.  Farzad demonstrated good  understanding of the education provided. See EMR under Patient Instructions for exercises provided during therapy sessions.    Assessment   ER is improving. Most limitaiton with OH flexion. Improved with prayer stretch and shoulder prop. Progressed HEP.    Farzad Is progressing well towards his goals.   Pt prognosis is Good.     Pt will continue to benefit from skilled outpatient physical therapy to address the deficits listed in the problem list box on initial evaluation, provide pt/family education " and to maximize pt's level of independence in the home and community environment.     Pt's spiritual, cultural and educational needs considered and pt agreeable to plan of care and goals.     Anticipated barriers to physical therapy: None    GOALS: Short Term Goals: 6 weeks (met)  1.Report decreased shoulder pain < / =  1/10  to increase tolerance for exercise  2. Increase PROM full ROM  4. Pt to tolerate HEP to improve ROM and independence with ADL's     Long Term Goals: 12 to 24 weeks  1.Report decreased shoulder pain  < / =  0 /10  to increase tolerance for ADLs (in progress, not met)  2.Increase AROM to equal to the contralateral limb (in progress, not met)  3.Increase strength to >/= 4/5 in shoulder girdle to increase tolerance for ADL and work activities. (in progress, not met)  4. Pt goal: restore full function (in progress, not met)  5. Pt will have improved gcode of CJ (20-40% limited) on FOTO shoulder in order to demonstrate true functional improvement. (in progress, not met)    Plan     Plan of care Certification: 1/12/2024 to 5/31/2024.    Focus on improving ROM and normalizing cuff function. No strengthening until 12 weeks post op.     Outpatient Physical Therapy 2 times weekly for 10 weeks to include the following interventions: manual therapy, therapeutic exercise, therapeutic activities, and neuromuscular re-education.     Otoniel Prasad, PT, DPT

## 2024-03-11 ENCOUNTER — CLINICAL SUPPORT (OUTPATIENT)
Dept: REHABILITATION | Facility: HOSPITAL | Age: 49
End: 2024-03-11
Payer: COMMERCIAL

## 2024-03-11 DIAGNOSIS — M25.512 ACUTE PAIN OF LEFT SHOULDER: Primary | ICD-10-CM

## 2024-03-11 PROCEDURE — 97140 MANUAL THERAPY 1/> REGIONS: CPT | Performed by: PHYSICAL THERAPIST

## 2024-03-11 PROCEDURE — 97112 NEUROMUSCULAR REEDUCATION: CPT | Performed by: PHYSICAL THERAPIST

## 2024-03-11 PROCEDURE — 97530 THERAPEUTIC ACTIVITIES: CPT | Performed by: PHYSICAL THERAPIST

## 2024-03-11 PROCEDURE — 97110 THERAPEUTIC EXERCISES: CPT | Performed by: PHYSICAL THERAPIST

## 2024-03-14 ENCOUNTER — CLINICAL SUPPORT (OUTPATIENT)
Dept: REHABILITATION | Facility: HOSPITAL | Age: 49
End: 2024-03-14
Payer: COMMERCIAL

## 2024-03-14 DIAGNOSIS — M25.512 ACUTE PAIN OF LEFT SHOULDER: Primary | ICD-10-CM

## 2024-03-14 PROCEDURE — 97530 THERAPEUTIC ACTIVITIES: CPT | Mod: CQ

## 2024-03-14 PROCEDURE — 97110 THERAPEUTIC EXERCISES: CPT | Mod: CQ

## 2024-03-14 PROCEDURE — 97112 NEUROMUSCULAR REEDUCATION: CPT | Mod: CQ

## 2024-03-14 PROCEDURE — 97140 MANUAL THERAPY 1/> REGIONS: CPT | Mod: CQ

## 2024-03-14 NOTE — PROGRESS NOTES
[facility]   Physical Therapy Treatment Note      Name: Farzda Moore  Clinic Number: [id number]    Therapy Diagnosis:   Encounter Diagnosis   Name Primary?    Acute pain of left shoulder Yes         Physician: Clay Pierson PA-C    Visit Date: 3/14/2024    Physician Orders: PT Eval and Treat   Medical Diagnosis from Referral:   S43.432D (ICD-10-CM) - Superior glenoid labrum lesion of left shoulder, subsequent encounter   M75.22 (ICD-10-CM) - Bicipital tendinitis of left shoulder      Evaluation Date: 1/12/2024  Authorization Period Expiration: 1/3/2025  Plan of Care Expiration: 5/31/2024  Progress Note Due: 2/22/2024  Visit # / Visits authorized: 14/ 20  FOTO: 1/3     Precautions: Standard,      OPERATION: 1/10/2024  Left  1. Shoulder arthroscopic rotator cuff repair (CPT 95657)  2. Shoulder open subpectoral biceps tenodesis (CPT 04362)  3. Shoulder arthroscopic extensive debridement (CPT 96882)    4. Shoulder arthroscopic subacromial decompression      Physical Therapy: Follow the < 3 cm cuff repair rehab protocol. PROM starts immediately given the patient's increased risk in this case for postoperative stiffness.. AROM starts after 6 weeks. No cuff resistive activity x 12 weeks. No biceps resistive activity x 8 weeks. Sling and pillow immobilization for 6 weeks.             PTA Visit #: 0/5     Time In: 0900  Time Out: 0956  Total Billable Time: 56 minutes     Subjective     Pt reports: Shoulder is getting better    He was compliant with home exercise program.  Response to previous treatment: Improved mobility  Functional change: NA    Pain: 1/10  Location: left shoulder      Objective      Objective Measures updated at progress report unless specified.     DOS: 1/10/2024  POW 7 weeks 6 day as of 3/4/2024    Passive Range of Motion:   Shoulder Left   Flexion 135   Abduction 90-NT   ER at 20 70   IR 20-NT         Treatment     Farzad received the treatments listed below:       therapeutic exercises to develop ROM, strength, flexibility, endurance for 10' minutes including:  Self mobilizations to improve ER at 0, 45, 90, 30x ea      manual therapy techniques: joint mobilizations and/or soft tissue mobilizations were applied to the: shoulder for 15 minutes, including:  Shoulder PROM to protocol  GH joint mobilizations gr I/II-np  Scapular mobilizations IV, upward rotation and posterior tilt-np  Wound/ROM assessment  MET for ER ROM    neuromuscular re-education activities to improve: motor control, balance, muscle activation, proprioception, posture for 23 minutes. The following activities were included:  Prone T 3 x 10  Prone row 4 lbs 3 x 10  Walk outs arm at neutral, OTB 3 x burn      therapeutic activities to improve functional performance for 8  minutes, including:  Wall slide 3 x 10 to for functional reach training.      Patient Education and Home Exercises       Education provided:   Sling weaning      Written Home Exercises Provided: yes. Exercises were reviewed and Farzad was able to demonstrate them prior to the end of the session.  Farzad demonstrated good  understanding of the education provided. See EMR under Patient Instructions for exercises provided during therapy sessions.    Assessment   Farzad cont to have decrease ER mobility which improved with METs. Con t with previous sessions interventions as these are still appropriate at this time.    Farzad Is progressing well towards his goals.   Pt prognosis is Good.     Pt will continue to benefit from skilled outpatient physical therapy to address the deficits listed in the problem list box on initial evaluation, provide pt/family education and to maximize pt's level of independence in the home and community environment.     Pt's spiritual, cultural and educational needs considered and pt agreeable to plan of care and goals.     Anticipated barriers to physical therapy: None    GOALS: Short Term Goals: 6 weeks  (met)  1.Report decreased shoulder pain < / =  1/10  to increase tolerance for exercise  2. Increase PROM full ROM  4. Pt to tolerate HEP to improve ROM and independence with ADL's     Long Term Goals: 12 to 24 weeks  1.Report decreased shoulder pain  < / =  0 /10  to increase tolerance for ADLs (in progress, not met)  2.Increase AROM to equal to the contralateral limb (in progress, not met)  3.Increase strength to >/= 4/5 in shoulder girdle to increase tolerance for ADL and work activities. (in progress, not met)  4. Pt goal: restore full function (in progress, not met)  5. Pt will have improved gcode of CJ (20-40% limited) on FOTO shoulder in order to demonstrate true functional improvement. (in progress, not met)    Plan     Plan of care Certification: 1/12/2024 to 5/31/2024.    Focus on improving ROM and normalizing cuff function. No strengthening until 12 weeks post op.     Outpatient Physical Therapy 2 times weekly for 10 weeks to include the following interventions: manual therapy, therapeutic exercise, therapeutic activities, and neuromuscular re-education.     Adrianne Palmer, PTA

## 2024-03-18 ENCOUNTER — CLINICAL SUPPORT (OUTPATIENT)
Dept: REHABILITATION | Facility: HOSPITAL | Age: 49
End: 2024-03-18
Payer: COMMERCIAL

## 2024-03-18 DIAGNOSIS — M25.512 ACUTE PAIN OF LEFT SHOULDER: Primary | ICD-10-CM

## 2024-03-18 PROCEDURE — 97112 NEUROMUSCULAR REEDUCATION: CPT | Performed by: PHYSICAL THERAPIST

## 2024-03-18 PROCEDURE — 97530 THERAPEUTIC ACTIVITIES: CPT | Performed by: PHYSICAL THERAPIST

## 2024-03-18 PROCEDURE — 97110 THERAPEUTIC EXERCISES: CPT | Performed by: PHYSICAL THERAPIST

## 2024-03-18 PROCEDURE — 97140 MANUAL THERAPY 1/> REGIONS: CPT | Performed by: PHYSICAL THERAPIST

## 2024-03-21 ENCOUNTER — CLINICAL SUPPORT (OUTPATIENT)
Dept: REHABILITATION | Facility: HOSPITAL | Age: 49
End: 2024-03-21
Payer: COMMERCIAL

## 2024-03-21 DIAGNOSIS — M25.512 ACUTE PAIN OF LEFT SHOULDER: Primary | ICD-10-CM

## 2024-03-21 PROCEDURE — 97112 NEUROMUSCULAR REEDUCATION: CPT | Performed by: PHYSICAL THERAPIST

## 2024-03-21 PROCEDURE — 97140 MANUAL THERAPY 1/> REGIONS: CPT | Performed by: PHYSICAL THERAPIST

## 2024-03-21 PROCEDURE — 97530 THERAPEUTIC ACTIVITIES: CPT | Performed by: PHYSICAL THERAPIST

## 2024-03-21 PROCEDURE — 97110 THERAPEUTIC EXERCISES: CPT | Performed by: PHYSICAL THERAPIST

## 2024-03-21 NOTE — PROGRESS NOTES
[facility]   Physical Therapy Treatment Note      Name: Farzad Moore  [facility] Number: [id number]    Therapy Diagnosis:   Encounter Diagnosis   Name Primary?    Acute pain of left shoulder Yes         Physician: Clay Pierson PA-C    Visit Date: 3/18/2024    Physician Orders: PT Eval and Treat   Medical Diagnosis from Referral:   S43.432D (ICD-10-CM) - Superior glenoid labrum lesion of left shoulder, subsequent encounter   M75.22 (ICD-10-CM) - Bicipital tendinitis of left shoulder      Evaluation Date: 1/12/2024  Authorization Period Expiration: 1/3/2025  Plan of Care Expiration: 5/31/2024  Progress Note Due: 2/22/2024  Visit # / Visits authorized: 15/ 20  FOTO: 1/3     Precautions: Standard,      OPERATION: 1/10/2024  Left  1. Shoulder arthroscopic rotator cuff repair (CPT 15675)  2. Shoulder open subpectoral biceps tenodesis (CPT 65003)  3. Shoulder arthroscopic extensive debridement (CPT 25464)    4. Shoulder arthroscopic subacromial decompression      Physical Therapy: Follow the < 3 cm cuff repair rehab protocol. PROM starts immediately given the patient's increased risk in this case for postoperative stiffness.. AROM starts after 6 weeks. No cuff resistive activity x 12 weeks. No biceps resistive activity x 8 weeks. Sling and pillow immobilization for 6 weeks.             PTA Visit #: 0/5     Time In: 0900  Time Out: 0958  Total Billable Time: 32 minutes     Subjective     Pt reports: Had some increased soreness after the previous session.    He was compliant with home exercise program.  Response to previous treatment: Improved mobility  Functional change: NA    Pain: 1/10  Location: left shoulder      Objective      Objective Measures updated at progress report unless specified.     DOS: 1/10/2024  POW 7 weeks 6 day as of 3/4/2024    Passive Range of Motion:   Shoulder Left   Flexion 135   Abduction 90-NT   ER at 20 70   IR 20-NT         Treatment     Physical Therapy  Tawnya  assisted with todays treatment. Tawnya was in direct line of sight supervision by supervising PT during this time.     Farzad received the treatments listed below:      therapeutic exercises to develop ROM, strength, flexibility, endurance for 15' minutes including:  Self mobilizations to improve ER at 0, 45, 90, 30x ea  Shoulder prop at 90 5'      manual therapy techniques: joint mobilizations and/or soft tissue mobilizations were applied to the: shoulder for 10 minutes, including:  Shoulder PROM to protocol  GH joint mobilizations gr I/II-np  Scapular mobilizations IV, upward rotation and posterior tilt-np  Wound/ROM assessment  MET for ER ROM    neuromuscular re-education activities to improve: motor control, balance, muscle activation, proprioception, posture for 8 minutes. The following activities were included:  Prone T 3 x 10  Walk outs arm at neutral, OTB 3 x burn      therapeutic activities to improve functional performance for 8  minutes, including:  Wall slide 3 x 10 to for functional reach training.      Patient Education and Home Exercises       Education provided:   Sling weaning      Written Home Exercises Provided: yes. Exercises were reviewed and Farzad was able to demonstrate them prior to the end of the session.  Farzad demonstrated good  understanding of the education provided. See EMR under Patient Instructions for exercises provided during therapy sessions.    Assessment   Shows improved functional reaching. Still limited with OH ROM. Continue with focus on mobility and periscapular strengthening.    Farzad Is progressing well towards his goals.   Pt prognosis is Good.     Pt will continue to benefit from skilled outpatient physical therapy to address the deficits listed in the problem list box on initial evaluation, provide pt/family education and to maximize pt's level of independence in the home and community environment.     Pt's spiritual, cultural and educational  needs considered and pt agreeable to plan of care and goals.     Anticipated barriers to physical therapy: None    GOALS: Short Term Goals: 6 weeks (met)  1.Report decreased shoulder pain < / =  1/10  to increase tolerance for exercise  2. Increase PROM full ROM  4. Pt to tolerate HEP to improve ROM and independence with ADL's     Long Term Goals: 12 to 24 weeks  1.Report decreased shoulder pain  < / =  0 /10  to increase tolerance for ADLs (in progress, not met)  2.Increase AROM to equal to the contralateral limb (in progress, not met)  3.Increase strength to >/= 4/5 in shoulder girdle to increase tolerance for ADL and work activities. (in progress, not met)  4. Pt goal: restore full function (in progress, not met)  5. Pt will have improved gcode of CJ (20-40% limited) on FOTO shoulder in order to demonstrate true functional improvement. (in progress, not met)    Plan     Plan of care Certification: 1/12/2024 to 5/31/2024.    Focus on improving ROM and normalizing cuff function. No strengthening until 12 weeks post op.     Outpatient Physical Therapy 2 times weekly for 10 weeks to include the following interventions: manual therapy, therapeutic exercise, therapeutic activities, and neuromuscular re-education.     Otoniel Prasad, PTA

## 2024-03-21 NOTE — PROGRESS NOTES
[facility]   Physical Therapy Treatment Note      Name: Farzad Moore  [facility] Number: [id number]    Therapy Diagnosis:   Encounter Diagnosis   Name Primary?    Acute pain of left shoulder Yes         Physician: Clay Pierson PA-C    Visit Date: 3/21/2024    Physician Orders: PT Eval and Treat   Medical Diagnosis from Referral:   S43.432D (ICD-10-CM) - Superior glenoid labrum lesion of left shoulder, subsequent encounter   M75.22 (ICD-10-CM) - Bicipital tendinitis of left shoulder      Evaluation Date: 1/12/2024  Authorization Period Expiration: 1/3/2025  Plan of Care Expiration: 5/31/2024  Progress Note Due: 2/22/2024  Visit # / Visits authorized: 16/ 20  FOTO: 1/3     Precautions: Standard,      OPERATION: 1/10/2024  Left  1. Shoulder arthroscopic rotator cuff repair (CPT 09671)  2. Shoulder open subpectoral biceps tenodesis (CPT 87043)  3. Shoulder arthroscopic extensive debridement (CPT 29289)    4. Shoulder arthroscopic subacromial decompression      Physical Therapy: Follow the < 3 cm cuff repair rehab protocol. PROM starts immediately given the patient's increased risk in this case for postoperative stiffness.. AROM starts after 6 weeks. No cuff resistive activity x 12 weeks. No biceps resistive activity x 8 weeks. Sling and pillow immobilization for 6 weeks.             PTA Visit #: 0/5     Time In: 1355  Time Out: 1450  Total Billable Time: 32 minutes     Subjective     Pt reports: Feeling good, has more mobility after the previous session.    He was compliant with home exercise program.  Response to previous treatment: Improved mobility  Functional change: NA    Pain: 1/10  Location: left shoulder      Objective      Objective Measures updated at progress report unless specified.     DOS: 1/10/2024  POW 10 weeks 2 day as of 3/21/2024    Passive Range of Motion:   Shoulder Left   Flexion 135   Abduction 90-NT   ER at 20 70   IR 20-NT         Treatment     Physical Therapy  Tawnya  assisted with todays treatment. Tawnya was in direct line of sight supervision by supervising PT during this time.     Farzad received the treatments listed below:      therapeutic exercises to develop ROM, strength, flexibility, endurance for 15' minutes including:  Self mobilizations to improve ER at 0, 45, 90, 30x ea  Shoulder prop at 90 5'    manual therapy techniques: joint mobilizations and/or soft tissue mobilizations were applied to the: shoulder for 10 minutes, including:  Shoulder PROM to protocol  GH joint mobilizations gr I/II-np  Scapular mobilizations IV, upward rotation and posterior tilt-np  Wound/ROM assessment  MET for ER ROM    neuromuscular re-education activities to improve: motor control, balance, muscle activation, proprioception, posture for 8 minutes. The following activities were included:  Prone T and Y, long lever with manual cuing   Prone row 4 lbs 3 x 10 to 15  Walk outs arm at neutral, OTB 3 x burn      therapeutic activities to improve functional performance for 8  minutes, including:  Wall slide 3 x 10 to for functional reach training.      Patient Education and Home Exercises       Education provided:   Sling weaning      Written Home Exercises Provided: yes. Exercises were reviewed and Farzad was able to demonstrate them prior to the end of the session.  Farzad demonstrated good  understanding of the education provided. See EMR under Patient Instructions for exercises provided during therapy sessions.    Assessment   Continues with slow, steady progression. Needs more scapular upward rotation with OH reaching. Continue joint mobility, ROM, cuff activation through range, and periscapular strengthening.    Farzad Is progressing well towards his goals.   Pt prognosis is Good.     Pt will continue to benefit from skilled outpatient physical therapy to address the deficits listed in the problem list box on initial evaluation, provide pt/family education and to  maximize pt's level of independence in the home and community environment.     Pt's spiritual, cultural and educational needs considered and pt agreeable to plan of care and goals.     Anticipated barriers to physical therapy: None    GOALS: Short Term Goals: 6 weeks (met)  1.Report decreased shoulder pain < / =  1/10  to increase tolerance for exercise  2. Increase PROM full ROM  4. Pt to tolerate HEP to improve ROM and independence with ADL's     Long Term Goals: 12 to 24 weeks  1.Report decreased shoulder pain  < / =  0 /10  to increase tolerance for ADLs (in progress, not met)  2.Increase AROM to equal to the contralateral limb (in progress, not met)  3.Increase strength to >/= 4/5 in shoulder girdle to increase tolerance for ADL and work activities. (in progress, not met)  4. Pt goal: restore full function (in progress, not met)  5. Pt will have improved gcode of CJ (20-40% limited) on FOTO shoulder in order to demonstrate true functional improvement. (in progress, not met)    Plan     Plan of care Certification: 1/12/2024 to 5/31/2024.    Focus on improving ROM and normalizing cuff function. No strengthening until 12 weeks post op.     Outpatient Physical Therapy 2 times weekly for 10 weeks to include the following interventions: manual therapy, therapeutic exercise, therapeutic activities, and neuromuscular re-education.     Otoniel Prasad, PTA

## 2024-03-25 ENCOUNTER — CLINICAL SUPPORT (OUTPATIENT)
Dept: REHABILITATION | Facility: HOSPITAL | Age: 49
End: 2024-03-25
Payer: COMMERCIAL

## 2024-03-25 DIAGNOSIS — M25.512 ACUTE PAIN OF LEFT SHOULDER: Primary | ICD-10-CM

## 2024-03-25 PROCEDURE — 97530 THERAPEUTIC ACTIVITIES: CPT | Performed by: PHYSICAL THERAPIST

## 2024-03-25 PROCEDURE — 97140 MANUAL THERAPY 1/> REGIONS: CPT | Performed by: PHYSICAL THERAPIST

## 2024-03-25 PROCEDURE — 97112 NEUROMUSCULAR REEDUCATION: CPT | Performed by: PHYSICAL THERAPIST

## 2024-03-25 NOTE — PROGRESS NOTES
[facility]   Physical Therapy Treatment Note      Name: Farzad Moore  [facility] Number: [id number]    Therapy Diagnosis:   Encounter Diagnosis   Name Primary?    Acute pain of left shoulder Yes         Physician: Clay Pierson PA-C    Visit Date: 3/25/2024    Physician Orders: PT Eval and Treat   Medical Diagnosis from Referral:   S43.432D (ICD-10-CM) - Superior glenoid labrum lesion of left shoulder, subsequent encounter   M75.22 (ICD-10-CM) - Bicipital tendinitis of left shoulder      Evaluation Date: 1/12/2024  Authorization Period Expiration: 1/3/2025  Plan of Care Expiration: 5/31/2024  Progress Note Due: 2/22/2024  Visit # / Visits authorized: 17/ 20  FOTO: 1/3     Precautions: Standard,      OPERATION: 1/10/2024  Left  1. Shoulder arthroscopic rotator cuff repair (CPT 42454)  2. Shoulder open subpectoral biceps tenodesis (CPT 61371)  3. Shoulder arthroscopic extensive debridement (CPT 33699)    4. Shoulder arthroscopic subacromial decompression      Physical Therapy: Follow the < 3 cm cuff repair rehab protocol. PROM starts immediately given the patient's increased risk in this case for postoperative stiffness.. AROM starts after 6 weeks. No cuff resistive activity x 12 weeks. No biceps resistive activity x 8 weeks. Sling and pillow immobilization for 6 weeks.             PTA Visit #: 0/5     Time In: 0857  Time Out: 1005  Total Billable Time: 45 minutes     Subjective     Pt reports: Just having trouble sleeping, pain is improving.     He was compliant with home exercise program.  Response to previous treatment: Improved mobility  Functional change: NA    Pain: 1/10  Location: left shoulder      Objective      Objective Measures updated at progress report unless specified.     DOS: 1/10/2024  POW 11 weeks 0 day as of 3/2/24614    Passive Range of Motion:   Shoulder Left   Flexion 135   Abduction 90-NT   ER at 20 70   IR 20-NT         Treatment     Physical Therapy [employer]  "assisted with todays treatment. Tawnya was in direct line of sight supervision by supervising PT during this time.     Farzad received the treatments listed below:      therapeutic exercises to develop ROM, strength, flexibility, endurance for 10' minutes including:  Self mobilizations to improve ER at 0, 45, 90, 30x ea  Shoulder prop at 90 5'    manual therapy techniques: joint mobilizations and/or soft tissue mobilizations were applied to the: shoulder for 10 minutes, including:  Shoulder PROM to protocol  GH joint mobilizations gr I/II-np  Scapular mobilizations IV, upward rotation and posterior tilt-np  Wound/ROM assessment  MET for ER ROM    neuromuscular re-education activities to improve: motor control, balance, muscle activation, proprioception, posture for 25 minutes. The following activities were included:  Supine long lever flexion 2 lbs 2 x 15  Supine ER at 90, 2 lbs 2 x 15  Prone I 10 x 10"  Prone T and Y, long lever with manual cuing   Prone row 4 lbs 3 x 10 to 15    Not performed  Walk outs arm at neutral, OTB 3 x burn      therapeutic activities to improve functional performance for 0  minutes, including:  Wall slide 3 x 10 to for functional reach training.      Patient Education and Home Exercises       Education provided:   Sling weaning      Written Home Exercises Provided: yes. Exercises were reviewed and Farzad was able to demonstrate them prior to the end of the session.  Farzad demonstrated good  understanding of the education provided. See EMR under Patient Instructions for exercises provided during therapy sessions.    Assessment   Continues to show stiffness but progresses with motor control exercises. Continue to progress well.    Farzad Is progressing well towards his goals.   Pt prognosis is Good.     Pt will continue to benefit from skilled outpatient physical therapy to address the deficits listed in the problem list box on initial evaluation, provide pt/family education and " to maximize pt's level of independence in the home and community environment.     Pt's spiritual, cultural and educational needs considered and pt agreeable to plan of care and goals.     Anticipated barriers to physical therapy: None    GOALS: Short Term Goals: 6 weeks (met)  1.Report decreased shoulder pain < / =  1/10  to increase tolerance for exercise  2. Increase PROM full ROM  4. Pt to tolerate HEP to improve ROM and independence with ADL's     Long Term Goals: 12 to 24 weeks  1.Report decreased shoulder pain  < / =  0 /10  to increase tolerance for ADLs (in progress, not met)  2.Increase AROM to equal to the contralateral limb (in progress, not met)  3.Increase strength to >/= 4/5 in shoulder girdle to increase tolerance for ADL and work activities. (in progress, not met)  4. Pt goal: restore full function (in progress, not met)  5. Pt will have improved gcode of CJ (20-40% limited) on FOTO shoulder in order to demonstrate true functional improvement. (in progress, not met)    Plan     Plan of care Certification: 1/12/2024 to 5/31/2024.    Focus on improving ROM and normalizing cuff function. No strengthening until 12 weeks post op.     Outpatient Physical Therapy 2 times weekly for 10 weeks to include the following interventions: manual therapy, therapeutic exercise, therapeutic activities, and neuromuscular re-education.     Otoniel Prasad, PT, DPT

## 2024-04-01 ENCOUNTER — CLINICAL SUPPORT (OUTPATIENT)
Dept: REHABILITATION | Facility: HOSPITAL | Age: 49
End: 2024-04-01
Payer: COMMERCIAL

## 2024-04-01 DIAGNOSIS — M25.512 ACUTE PAIN OF LEFT SHOULDER: Primary | ICD-10-CM

## 2024-04-01 DIAGNOSIS — Z98.890 S/P LEFT ROTATOR CUFF REPAIR: ICD-10-CM

## 2024-04-01 PROCEDURE — 97110 THERAPEUTIC EXERCISES: CPT | Performed by: PHYSICAL THERAPIST

## 2024-04-01 PROCEDURE — 97112 NEUROMUSCULAR REEDUCATION: CPT | Performed by: PHYSICAL THERAPIST

## 2024-04-01 PROCEDURE — 97140 MANUAL THERAPY 1/> REGIONS: CPT | Performed by: PHYSICAL THERAPIST

## 2024-04-01 RX ORDER — METHOCARBAMOL 500 MG/1
500 TABLET, FILM COATED ORAL 4 TIMES DAILY
Qty: 40 TABLET | Refills: 0 | Status: SHIPPED | OUTPATIENT
Start: 2024-04-01 | End: 2024-04-23 | Stop reason: SDUPTHER

## 2024-04-01 RX ORDER — CELECOXIB 200 MG/1
200 CAPSULE ORAL 2 TIMES DAILY
Qty: 56 CAPSULE | Refills: 1 | Status: SHIPPED | OUTPATIENT
Start: 2024-04-01 | End: 2024-06-01 | Stop reason: SDUPTHER

## 2024-04-01 NOTE — TELEPHONE ENCOUNTER
Hello, This wilmer is so hard to message the right person for medication.  I'm trying to refill celecoxib 200mg  Methocarbamol 500mg  My pharmacy is [facility]   [address] [facility]

## 2024-04-03 NOTE — PROGRESS NOTES
[facility]   Physical Therapy Treatment Note      Name: Farzad Moore  [facility] Number: [id number]    Therapy Diagnosis:   Encounter Diagnosis   Name Primary?    Acute pain of left shoulder Yes         Physician: Clay Pierson PA-C    Visit Date: 4/1/2024    Physician Orders: PT Eval and Treat   Medical Diagnosis from Referral:   S43.432D (ICD-10-CM) - Superior glenoid labrum lesion of left shoulder, subsequent encounter   M75.22 (ICD-10-CM) - Bicipital tendinitis of left shoulder      Evaluation Date: 1/12/2024  Authorization Period Expiration: 1/3/2025  Plan of Care Expiration: 5/31/2024  Progress Note Due: 2/22/2024  Visit # / Visits authorized: 18/ 20  FOTO: 1/3     Precautions: Standard,      OPERATION: 1/10/2024  Left  1. Shoulder arthroscopic rotator cuff repair (CPT 29845)  2. Shoulder open subpectoral biceps tenodesis (CPT 48881)  3. Shoulder arthroscopic extensive debridement (CPT 43695)    4. Shoulder arthroscopic subacromial decompression      Physical Therapy: Follow the < 3 cm cuff repair rehab protocol. PROM starts immediately given the patient's increased risk in this case for postoperative stiffness.. AROM starts after 6 weeks. No cuff resistive activity x 12 weeks. No biceps resistive activity x 8 weeks. Sling and pillow immobilization for 6 weeks.             PTA Visit #: 0/5     Time In: 0859  Time Out: 1010  Total Billable Time: 45 minutes     Subjective     Pt reports: No real pain. Overall reports continued progress.     He was compliant with home exercise program.  Response to previous treatment: Improved mobility  Functional change: NA    Pain: 1/10  Location: left shoulder      Objective      Objective Measures updated at progress report unless specified.     DOS: 1/10/2024  POW 11 weeks 0 day as of 3/2/19974    Passive Range of Motion:   Shoulder Left   Flexion 135   Abduction 90-NT   ER at 20 70   IR 20-NT         Treatment     Physical Therapy [employer]  "assisted with todays treatment. Tawnya was in direct line of sight supervision by supervising PT during this time.     Farzad received the treatments listed below:      therapeutic exercises to develop ROM, strength, flexibility, endurance for 10' minutes including:  Self mobilizations to improve ER at 0, 45, 90, 30x ea  Shoulder prop at 90 5'    manual therapy techniques: joint mobilizations and/or soft tissue mobilizations were applied to the: shoulder for 10 minutes, including:  Shoulder PROM to protocol  GH joint mobilizations gr I/II-np  Scapular mobilizations IV, upward rotation and posterior tilt-np    neuromuscular re-education activities to improve: motor control, balance, muscle activation, proprioception, posture for 25 minutes. The following activities were included:  Supine long lever flexion 2 lbs 2 x 15  Supine ER at 90, 2 lbs 2 x 15  Prone I 10 x 10"  Prone T and Y, long lever with manual cuing   ER arm by side 3 x burn OTB    Not performed  Walk outs arm at neutral, OTB 3 x burn      therapeutic activities to improve functional performance for 0  minutes, including:  Wall slide 3 x 10 to for functional reach training.      Patient Education and Home Exercises       Education provided:   Sling weaning      Written Home Exercises Provided: yes. Exercises were reviewed and Farzad was able to demonstrate them prior to the end of the session.  Farzad demonstrated good  understanding of the education provided. See EMR under Patient Instructions for exercises provided during therapy sessions.    Assessment   ROM is nearing the contralateral side. Both are very limited compared to normative data. Transitioning into more strength based training to improve RTC and periscapualr function.     Farzad Is progressing well towards his goals.   Pt prognosis is Good.     Pt will continue to benefit from skilled outpatient physical therapy to address the deficits listed in the problem list box on initial " evaluation, provide pt/family education and to maximize pt's level of independence in the home and community environment.     Pt's spiritual, cultural and educational needs considered and pt agreeable to plan of care and goals.     Anticipated barriers to physical therapy: None    GOALS: Short Term Goals: 6 weeks (met)  1.Report decreased shoulder pain < / =  1/10  to increase tolerance for exercise  2. Increase PROM full ROM  4. Pt to tolerate HEP to improve ROM and independence with ADL's     Long Term Goals: 12 to 24 weeks  1.Report decreased shoulder pain  < / =  0 /10  to increase tolerance for ADLs (in progress, not met)  2.Increase AROM to equal to the contralateral limb (in progress, not met)  3.Increase strength to >/= 4/5 in shoulder girdle to increase tolerance for ADL and work activities. (in progress, not met)  4. Pt goal: restore full function (in progress, not met)  5. Pt will have improved gcode of CJ (20-40% limited) on FOTO shoulder in order to demonstrate true functional improvement. (in progress, not met)    Plan     Plan of care Certification: 1/12/2024 to 5/31/2024.    Focus on improving ROM and normalizing cuff function. No strengthening until 12 weeks post op.     Outpatient Physical Therapy 2 times weekly for 10 weeks to include the following interventions: manual therapy, therapeutic exercise, therapeutic activities, and neuromuscular re-education.     Otoniel Prasad, PT, DPT

## 2024-04-04 ENCOUNTER — OFFICE VISIT (OUTPATIENT)
Dept: PRIMARY CARE CLINIC | Facility: CLINIC | Age: 49
End: 2024-04-04
Payer: COMMERCIAL

## 2024-04-04 VITALS
RESPIRATION RATE: 16 BRPM | BODY MASS INDEX: 27.92 KG/M2 | WEIGHT: 236.44 LBS | OXYGEN SATURATION: 98 % | TEMPERATURE: 98 F | HEIGHT: 77 IN | SYSTOLIC BLOOD PRESSURE: 120 MMHG | HEART RATE: 93 BPM | DIASTOLIC BLOOD PRESSURE: 80 MMHG

## 2024-04-04 DIAGNOSIS — Z82.49 FAMILY HISTORY OF HEART DISEASE IN MALE FAMILY MEMBER BEFORE AGE 55: ICD-10-CM

## 2024-04-04 DIAGNOSIS — M25.512 CHRONIC LEFT SHOULDER PAIN: ICD-10-CM

## 2024-04-04 DIAGNOSIS — G47.00 INSOMNIA, UNSPECIFIED TYPE: ICD-10-CM

## 2024-04-04 DIAGNOSIS — Z00.00 ANNUAL PHYSICAL EXAM: ICD-10-CM

## 2024-04-04 DIAGNOSIS — F90.2 ATTENTION DEFICIT HYPERACTIVITY DISORDER (ADHD), COMBINED TYPE: ICD-10-CM

## 2024-04-04 DIAGNOSIS — Z76.89 ENCOUNTER TO ESTABLISH CARE: Primary | ICD-10-CM

## 2024-04-04 DIAGNOSIS — G89.29 CHRONIC LEFT SHOULDER PAIN: ICD-10-CM

## 2024-04-04 DIAGNOSIS — Z79.899 OTHER LONG TERM (CURRENT) DRUG THERAPY: ICD-10-CM

## 2024-04-04 DIAGNOSIS — Z23 NEED FOR VACCINATION: ICD-10-CM

## 2024-04-04 PROCEDURE — 99999 PR PBB SHADOW E&M-EST. PATIENT-LVL V: CPT | Mod: PBBFAC,,, | Performed by: STUDENT IN AN ORGANIZED HEALTH CARE EDUCATION/TRAINING PROGRAM

## 2024-04-04 PROCEDURE — 1160F RVW MEDS BY RX/DR IN RCRD: CPT | Mod: CPTII,S$GLB,, | Performed by: STUDENT IN AN ORGANIZED HEALTH CARE EDUCATION/TRAINING PROGRAM

## 2024-04-04 PROCEDURE — 3079F DIAST BP 80-89 MM HG: CPT | Mod: CPTII,S$GLB,, | Performed by: STUDENT IN AN ORGANIZED HEALTH CARE EDUCATION/TRAINING PROGRAM

## 2024-04-04 PROCEDURE — 1159F MED LIST DOCD IN RCRD: CPT | Mod: CPTII,S$GLB,, | Performed by: STUDENT IN AN ORGANIZED HEALTH CARE EDUCATION/TRAINING PROGRAM

## 2024-04-04 PROCEDURE — 3008F BODY MASS INDEX DOCD: CPT | Mod: CPTII,S$GLB,, | Performed by: STUDENT IN AN ORGANIZED HEALTH CARE EDUCATION/TRAINING PROGRAM

## 2024-04-04 PROCEDURE — 99396 PREV VISIT EST AGE 40-64: CPT | Mod: 25,S$GLB,, | Performed by: STUDENT IN AN ORGANIZED HEALTH CARE EDUCATION/TRAINING PROGRAM

## 2024-04-04 PROCEDURE — 90715 TDAP VACCINE 7 YRS/> IM: CPT | Mod: S$GLB,,, | Performed by: STUDENT IN AN ORGANIZED HEALTH CARE EDUCATION/TRAINING PROGRAM

## 2024-04-04 PROCEDURE — 90471 IMMUNIZATION ADMIN: CPT | Mod: S$GLB,,, | Performed by: STUDENT IN AN ORGANIZED HEALTH CARE EDUCATION/TRAINING PROGRAM

## 2024-04-04 PROCEDURE — 3074F SYST BP LT 130 MM HG: CPT | Mod: CPTII,S$GLB,, | Performed by: STUDENT IN AN ORGANIZED HEALTH CARE EDUCATION/TRAINING PROGRAM

## 2024-04-04 NOTE — PATIENT INSTRUCTIONS
Encounter to establish care/Annual physical exam  -  CBC, CMP, TSH, T4, Lipids and A1c.      Chronic left shoulder pain   - rotator cuff repair surgery proximally 3 months ago.  Currently in physical therapy working to regain full function.  States he recently started lifting weights with it and feels like he is tolerating this therapy well.    Attention deficit hyperactivity disorder (ADHD), combined type   - patient with history of ADHD, has prescription for Adderall extended release 30 mg, gets 90 tabs per month.  States takes 2 tabs most days.  Discussed the concern provider has of high dose he is currently being prescribed down considering titrating down which patient states he has discuss with his psychiatrist already and they are considering as he has been needing to use multiple doses per day less frequently of late.    Insomnia, unspecified type   - patient states has some issues with insomnia at times.  Takes sleep aid nightly to help with sleep.  Advised may be due to use of high-dose stimulants and lowering that dose may help with sleep quality.    Need for vaccination  -     (In Office Administered) Tdap Vaccine   - patient works as a , advised get tetanus shot up-to-date.  Patient agrees.  Test was ordered    Family history of heart disease in male family member before age 55   - patient's last EKG and lipids were both normal.  But should continue to keep pressure controlled and regular checks on cholesterol due to mother's history of heart issue as well as paternal uncles history of heart attack at young age     Other long term (current) drug therapy  -  CBC, CMP, TSH, T4, Lipids and A1c.   -

## 2024-04-04 NOTE — PROGRESS NOTES
Subjective:           Patient ID: Farzad Moore   Age:  49 y.o.  Sex: male     Chief Complaint:   Establish Care and Annual Exam      History of Present Illness:    Farzad Moore is a 49 y.o. male who presents today with a chief complaint of Establish Care and Annual Exam  .      States lost a lot in [place], had blood in his stool after.  Though he was dying.    Then improved diet and it passed.  Then 9 months ago, came back so fixed diet and it went away.    Reports had a colonoscopy around age 32, was reassuring at that time, no alarming findings.    States his wife was also advising he get his prostate checked.     Had EGD in 11/2022 for difficulty with swallowing.  Did some dilation at that time.     Had sx for the left shoulder due to rotator cuff issues.     Seeing Psych for ADHD, getting Adderall 30mg, 90 tabs per monthly.  States is usually taking 2 of these a day.  Is taking Klonopin 1mg nightly to get to sleep, getting 45 tabs monthly.   Reports taking one a night.        Review of Systems   Constitutional: Negative.  Negative for fatigue and fever.   HENT: Negative.  Negative for congestion, sinus pressure and sinus pain.    Eyes: Negative.    Respiratory: Negative.  Negative for cough, choking, shortness of breath and wheezing.    Cardiovascular: Negative.  Negative for chest pain, palpitations and leg swelling.   Gastrointestinal: Negative.  Negative for constipation, diarrhea, nausea and vomiting.   Endocrine: Negative.    Genitourinary: Negative.  Negative for difficulty urinating and frequency.   Musculoskeletal:  Positive for arthralgias (left shoulder recovering from surgery.) and myalgias.   Skin: Negative.    Allergic/Immunologic: Negative for food allergies.   Neurological:  Negative for weakness and headaches.   Psychiatric/Behavioral:  Positive for decreased concentration and sleep disturbance. The patient is not nervous/anxious.            Objective:        Vitals:    04/04/24 1123  "  BP: 120/80   BP Location: Right arm   Patient Position: Sitting   BP Method: Medium (Manual)   Pulse: 93   Resp: 16   Temp: 97.6 °F (36.4 °C)   TempSrc: Oral   SpO2: 98%   Weight: 107.2 kg (236 lb 7.1 oz)   Height: 6' 5" (1.956 m)       Body mass index is 28.04 kg/m².      Physical Exam  Constitutional:       Appearance: Normal appearance. He is not toxic-appearing.      Comments: As per BMI.   HENT:      Head: Normocephalic and atraumatic.      Right Ear: External ear normal.      Left Ear: External ear normal.      Nose: No congestion.      Mouth/Throat:      Mouth: Mucous membranes are moist.      Pharynx: Oropharynx is clear.   Eyes:      Extraocular Movements: Extraocular movements intact.      Conjunctiva/sclera: Conjunctivae normal.   Cardiovascular:      Rate and Rhythm: Normal rate and regular rhythm.      Heart sounds: No murmur heard.  Pulmonary:      Effort: Pulmonary effort is normal. No respiratory distress.      Breath sounds: No wheezing.   Abdominal:      General: Bowel sounds are normal.      Palpations: Abdomen is soft.   Musculoskeletal:         General: No swelling.      Cervical back: Normal range of motion.   Skin:     General: Skin is warm.      Capillary Refill: Capillary refill takes less than 2 seconds.      Coloration: Skin is not jaundiced.   Neurological:      General: No focal deficit present.      Mental Status: He is alert and oriented to person, place, and time.      Motor: No weakness.   Psychiatric:         Mood and Affect: Mood normal.           Past Medical History:   Diagnosis Date    ADHD     Right inguinal hernia 12/14/2018       Lab Results   Component Value Date     04/28/2021    K 3.8 04/28/2021     04/28/2021    CO2 22 (L) 04/28/2021    BUN 11 04/28/2021    CREATININE 1.3 04/28/2021    ANIONGAP 9 04/28/2021     Lab Results   Component Value Date    HGBA1C 4.8 01/08/2021     No results found for: "BNP", "BNPTRIAGEBLO"    Lab Results   Component Value Date    " WBC 7.90 04/28/2021    HGB 16.0 04/28/2021    HCT 48.0 04/28/2021     04/28/2021    GRAN 6.2 04/28/2021    GRAN 78.7 (H) 04/28/2021     Lab Results   Component Value Date    CHOL 196 01/08/2021    HDL 37 (L) 01/08/2021    LDLCALC 107.2 01/08/2021    TRIG 259 (H) 01/08/2021        Outpatient Encounter Medications as of 4/4/2024   Medication Sig Dispense Refill    celecoxib (CELEBREX) 200 MG capsule Take 1 capsule (200 mg total) by mouth 2 (two) times daily. 56 capsule 1    clonazePAM (KLONOPIN) 1 MG tablet Take 1.5 mg by mouth every evening.      dextroamphetamine-amphetamine (ADDERALL XR) 30 MG 24 hr capsule Take 30 mg by mouth 3 (three) times daily.      fluticasone propionate (FLONASE) 50 mcg/actuation nasal spray 1 spray by Each Nostril route once daily.      ibuprofen (ADVIL,MOTRIN) 800 MG tablet Take 1 tablet (800 mg total) by mouth 3 (three) times daily. As needed for pain 90 tablet 1    methocarbamoL (ROBAXIN) 500 MG Tab Take 1 tablet (500 mg total) by mouth 4 (four) times daily. for 10 days 40 tablet 0    omeprazole (PRILOSEC) 40 MG capsule Take 1 capsule (40 mg total) by mouth once daily. 30 capsule 5    [DISCONTINUED] aspirin 81 MG Chew Chew and swallow  1 tablet (81 mg total) by mouth 2 (two) times a day. for 14 days 28 tablet 0    [DISCONTINUED] celecoxib (CELEBREX) 200 MG capsule Take 1 capsule (200 mg total) by mouth 2 (two) times daily. 56 capsule 1    [DISCONTINUED] cetirizine (ZYRTEC) 10 MG tablet Take 10 mg by mouth daily as needed.      [DISCONTINUED] doxycycline (VIBRAMYCIN) 100 MG Cap Take 1 capsule by mouth 2 (two) times daily.      [DISCONTINUED] HYDROcodone-acetaminophen (NORCO) 5-325 mg per tablet Take 1 tablet by mouth every 6 (six) hours as needed for Pain. (Patient not taking: Reported on 2/22/2024) 20 tablet 0    [DISCONTINUED] ondansetron (ZOFRAN) 4 MG tablet Take 1 tablet (4 mg total) by mouth every 8 (eight) hours as needed for Nausea. (Patient not taking: Reported on  2/22/2024) 20 tablet 0    [DISCONTINUED] oxyCODONE (ROXICODONE) 5 MG immediate release tablet Take 1 tablet (5 mg total) by mouth every 6 (six) hours as needed for Pain. (Patient not taking: Reported on 2/22/2024) 28 tablet 0     No facility-administered encounter medications on file as of 4/4/2024.          Assessment:       1. Encounter to establish care    2. Chronic left shoulder pain    3. Attention deficit hyperactivity disorder (ADHD), combined type    4. Insomnia, unspecified type    5. Need for vaccination    6. Family history of heart disease in male family member before age 55    7. Annual physical exam    8. Other long term (current) drug therapy           Plan:         Encounter to establish care/Annual physical exam  -  CBC, CMP, TSH, T4, Lipids and A1c.      Chronic left shoulder pain   - rotator cuff repair surgery proximally 3 months ago.  Currently in physical therapy working to regain full function.  States he recently started lifting weights with it and feels like he is tolerating this therapy well.    Attention deficit hyperactivity disorder (ADHD), combined type   - patient with history of ADHD, has prescription for Adderall extended release 30 mg, gets 90 tabs per month.  States takes 2 tabs most days.  Discussed the concern provider has of high dose he is currently being prescribed down considering titrating down which patient states he has discuss with his psychiatrist already and they are considering as he has been needing to use multiple doses per day less frequently of late.    Insomnia, unspecified type   - patient states has some issues with insomnia at times.  Takes sleep aid nightly to help with sleep.  Advised may be due to use of high-dose stimulants and lowering that dose may help with sleep quality.    Need for vaccination  -     (In Office Administered) Tdap Vaccine   - patient works as a , advised get tetanus shot up-to-date.  Patient agrees.  Test was ordered    Family  history of heart disease in male family member before age 55   - patient's last EKG and lipids were both normal.  But should continue to keep pressure controlled and regular checks on cholesterol due to mother's history of heart issue as well as paternal uncles history of heart attack at young age     Other long term (current) drug therapy  -  CBC, CMP, TSH, T4, Lipids and A1c.   -

## 2024-04-04 NOTE — PROGRESS NOTES
Verified pt ID using name and . rickey Roque. Administered tdap in right deltoid per physician order using aseptic technique. Aspirated and no blood return noted. Pt tolerated well with no adverse reactions noted.

## 2024-04-09 ENCOUNTER — PATIENT MESSAGE (OUTPATIENT)
Dept: PRIMARY CARE CLINIC | Facility: CLINIC | Age: 49
End: 2024-04-09
Payer: COMMERCIAL

## 2024-04-09 ENCOUNTER — PATIENT MESSAGE (OUTPATIENT)
Dept: SPORTS MEDICINE | Facility: CLINIC | Age: 49
End: 2024-04-09
Payer: COMMERCIAL

## 2024-04-09 DIAGNOSIS — Z12.11 COLON CANCER SCREENING: Primary | ICD-10-CM

## 2024-04-09 NOTE — TELEPHONE ENCOUNTER
Patient states he was suppose to get a colonoscopy but no one has called him.  I don't see an order for one.  Please advise

## 2024-04-12 ENCOUNTER — TELEPHONE (OUTPATIENT)
Dept: SURGERY | Facility: CLINIC | Age: 49
End: 2024-04-12
Payer: COMMERCIAL

## 2024-04-12 ENCOUNTER — PATIENT MESSAGE (OUTPATIENT)
Dept: SURGERY | Facility: CLINIC | Age: 49
End: 2024-04-12
Payer: COMMERCIAL

## 2024-04-12 NOTE — TELEPHONE ENCOUNTER
Called patient in reference to a referral to Colorectal Surgery for colon cancer screening. Patient verbally consented to a Colonoscopy and requested to be scheduled for a Colonoscopy on 04/30/2024 Patient was advised a designated  is required on the day of the Colonoscopy to drive the patient home and the  must be at least. 18 years old.Colonoscopy Prep instructions were thoroughly explained and discussed with the patient.It was emphasized, and reiterated to the patient, to please not to follow the bowel prep instructions that comes with the bowel prep package.However, to please follow the prep instructions that will be received in the mail,or via the [employer] portal, or by both modes of delivery, which ever method of delivery the patient prefers,from the [facility]   Patient acknowledges understanding Prep instructions as explained and discussed on the phone.. Patient was advised the Colonoscopy Prep instructions discussed and explained on the phone,are being mailed out to the patient's verified address on file,or put onto the my[employer]x portal,or both methods of delivery, whichever the patient prefers. Patient's address on file was verified with the patient for accuracy of mailing. Patient's medications on file was reviewed with the patient for accuracy of information. Patient denies taking  any other medications other than those listed and verified on medication profile.Patient was explained the Colonoscopy will be performed here at [facility]. Patient was further explained the Pre-Op will call one day prior to the procedure date, to discuss Pre-Op instructions;and what time to report for the Colonoscopy. The patient was given the opportunity to ask any questions about the Colonoscopy. No further issues were discussed.

## 2024-04-12 NOTE — TELEPHONE ENCOUNTER
The patient has been advised the Colonoscopy Prep Kit will be ordered from the patient's verified preferred pharmacy on file. The medication can  be picked up by the patient, or the patient's designated representative.The patient was further explained the Colonoscopy Prep instructions will be mailed to the patient verified mailing address on file, or put onto the [employer] portal, whichever method of delivery the patient prefers.Additionally this patient was informed,not to follow the instructions that comes with the bowel prep medication. However, the patient was instructed to please follow the Colonoscopy Bowel Prep instructions that's being provided by the . The patient was asked to please to follow the Colonoscopy Prep instructions being provided as precisely,and  meticulously as possible.The patient was advised you  will receive a follow up phone call to summarize the Colonoscopy Prep instructions prior to the scheduled Colonoscopy procedure date. At this time the patient will be given an opportunity to ask any questions regarding the Colonoscopy procedure, and it's associated Bowel Prep instructions.

## 2024-04-16 ENCOUNTER — TELEPHONE (OUTPATIENT)
Dept: SPORTS MEDICINE | Facility: CLINIC | Age: 49
End: 2024-04-16
Payer: COMMERCIAL

## 2024-04-16 NOTE — TELEPHONE ENCOUNTER
Spoke to patient regarding his missed appointment today with Dr Farris. Patient sated his shoulder feels great. He will have to call back to reschedule due to his work schedule. Stated he may be losing his job due to missing work.

## 2024-04-17 ENCOUNTER — TELEPHONE (OUTPATIENT)
Dept: SURGERY | Facility: CLINIC | Age: 49
End: 2024-04-17
Payer: COMMERCIAL

## 2024-04-17 NOTE — TELEPHONE ENCOUNTER
A called was placed to this patient re: a message received the patient is  requesting to reschedule the  Colonoscopy scheduled for 04/30/2024. Patient chose to reschedule the Colonoscopy for 05/01/2024. Patient was informed the Colonoscopy Prep instructions received for the canceled Colonoscopy, remains applicable to the rescheduled Colonoscopy. Colonoscopy Prep instructions were reiterated,discussed and explained meticulously in minute,thorough detail. Patient acquiesced understanding of instructions. The patient was offered the opportunity ask any questions about the Colonoscopy procedure and the related Colonoscopy Prep instructions.  Nothing further was discussed.

## 2024-04-22 RX ORDER — SODIUM, POTASSIUM,MAG SULFATES 17.5-3.13G
1 SOLUTION, RECONSTITUTED, ORAL ORAL DAILY
Qty: 1 KIT | Refills: 0 | Status: SHIPPED | OUTPATIENT
Start: 2024-04-22 | End: 2024-04-24

## 2024-04-23 ENCOUNTER — PATIENT MESSAGE (OUTPATIENT)
Dept: SPORTS MEDICINE | Facility: CLINIC | Age: 49
End: 2024-04-23
Payer: COMMERCIAL

## 2024-04-23 DIAGNOSIS — Z98.890 S/P LEFT ROTATOR CUFF REPAIR: ICD-10-CM

## 2024-04-23 RX ORDER — METHOCARBAMOL 500 MG/1
500 TABLET, FILM COATED ORAL 4 TIMES DAILY
Qty: 40 TABLET | Refills: 0 | Status: SHIPPED | OUTPATIENT
Start: 2024-04-23 | End: 2024-05-03

## 2024-04-26 ENCOUNTER — DOCUMENTATION ONLY (OUTPATIENT)
Dept: SURGERY | Facility: CLINIC | Age: 49
End: 2024-04-26
Payer: COMMERCIAL

## 2024-04-26 NOTE — PROGRESS NOTES
This patient's Colonoscopy scheduled for 05/01/2024 is being canceled re: the following patient message received:    I nerd to cancel this appointment.  I got laid off from my job.

## 2024-04-29 NOTE — PROGRESS NOTES
"CC: Left shoulder post-op follow up     DATE OF PROCEDURE: 1/10/2024   OPERATION:   Left  1. Shoulder arthroscopic rotator cuff repair (CPT 53415)  2. Shoulder open subpectoral biceps tenodesis (CPT 43894)  3. Shoulder arthroscopic extensive debridement (CPT 32149)    4. Shoulder arthroscopic subacromial decompression      Farzad Moore presents today for follow up appointment of his left shoulder. Patient is now 3 months 20 days status post above procedure. Last PT session 04/01/24 at [facility]. He states he missed PT last week due to being out of town. He also states that he has been laid off from his work as of a few days ago and is currently trying to figure out insurance and unemployment.  Per chart review his last physical therapy appointment was 4/1/24.  He denies any pain in the shoulder.  Making progress by his account.    Prior Hx 2/22/2024:   Farzad Moore reports to be doing well 6 weeks 1 day s/p the above mentioned procedure. He was evaluated at an external urgent care 02/20/24 due to lateral portal wound concern. He was prescribed doxycycline. He reports he initially had his sutures removed early and then had the steri-strips replaced at that time and has had them up until a few days ago. Still presents with a steri-strip today at the posterior portal.  Denies fevers, chills, night sweats, chest pain, difficulty breathing, calf pain or tenderness. Continues PT at [place] location with [facility]. Seeing good progress daily. Pain levels are improving and in fact he states that the shoulder feels "night and day" better compared to preop. Denies any pain at rest, denies any pain with passive range of motion throughout his arc of motion.    PAST MEDICAL HISTORY:   Past Medical History:   Diagnosis Date    ADHD     Right inguinal hernia 12/14/2018     PAST SURGICAL HISTORY:  Past Surgical History:   Procedure Laterality Date    ARTHROSCOPIC DEBRIDEMENT OF SHOULDER Left 01/10/2024    Procedure: " DEBRIDEMENT, SHOULDER, ARTHROSCOPIC- POLAR CARE;  Surgeon: CLOTILDE Farris MD;  Location: [facility] OR;  Service: Orthopedics;  Laterality: Left;  REGIONAL WITHOUT Catheter, Interscalene  0.5% [place] on field    ARTHROSCOPIC REPAIR OF ROTATOR CUFF OF SHOULDER Left 01/10/2024    Procedure: REPAIR, ROTATOR CUFF, ARTHROSCOPIC;  Surgeon: CLOTILDE Farris MD;  Location: [facility] OR;  Service: Orthopedics;  Laterality: Left;    ARTHROSCOPY OF SHOULDER WITH DECOMPRESSION OF SUBACROMIAL SPACE Left 01/10/2024    Procedure: ARTHROSCOPY, SHOULDER, WITH SUBACROMIAL SPACE DECOMPRESSION;  Surgeon: CLOTILDE Farris MD;  Location: [facility] OR;  Service: Orthopedics;  Laterality: Left;    ARTHROSCOPY,SHOULDER,WITH BICEPS TENODESIS Left 01/10/2024    Procedure: ARTHROSCOPY,SHOULDER,WITH BICEPS TENODESIS;  Surgeon: CLOTILDE Farris MD;  Location: [facility] OR;  Service: Orthopedics;  Laterality: Left;  REGIONAL WITHOUT Catheter, Interscalene  0.5% Marcain Plain on field    ESOPHAGOGASTRODUODENOSCOPY N/A 11/11/2022    Procedure: EGD (ESOPHAGOGASTRODUODENOSCOPY);  Surgeon: Baylee Flores MD;  Location: [facility];  Service: Endoscopy;  Laterality: N/A;    HERNIA REPAIR      x2, inguinal    KNEE SURGERY Right      FAMILY HISTORY:  Family History   Problem Relation Name Age of Onset    Diabetes Mother          diet controlled    Heart attack Father  38        x 2.  Hit head and had a brain bleed.    Lung cancer Father      Heart attack Paternal Uncle  50    Colon cancer Neg Hx      Crohn's disease Neg Hx      Ulcerative colitis Neg Hx      Prostate cancer Neg Hx      Stroke Neg Hx       MEDICATIONS:    Current Outpatient Medications:     celecoxib (CELEBREX) 200 MG capsule, Take 1 capsule (200 mg total) by mouth 2 (two) times daily., Disp: 56 capsule, Rfl: 1    clonazePAM (KLONOPIN) 1 MG tablet, Take 1.5 mg by mouth every evening., Disp: , Rfl:     dextroamphetamine-amphetamine (ADDERALL XR) 30 MG 24 hr capsule, Take 30 mg by mouth 3  "(three) times daily., Disp: , Rfl:     fluticasone propionate (FLONASE) 50 mcg/actuation nasal spray, 1 spray by Each Nostril route once daily., Disp: , Rfl:     methocarbamoL (ROBAXIN) 500 MG Tab, Take 1 tablet (500 mg total) by mouth 4 (four) times daily. for 10 days, Disp: 40 tablet, Rfl: 0    omeprazole (PRILOSEC) 40 MG capsule, Take 1 capsule (40 mg total) by mouth once daily., Disp: 30 capsule, Rfl: 5    ALLERGIES:  Review of patient's allergies indicates:  No Known Allergies     REVIEW OF SYSTEMS:  Constitution: Negative. Negative for chills, fever and night sweats.    Hematologic/Lymphatic: Negative for bleeding problem. Does not bruise/bleed easily.   Skin: Negative for dry skin, itching and rash.   Musculoskeletal: Negative for falls.  Negative for left shoulder pain and muscle weakness.     All other review of symptoms were reviewed and found to be noncontributory.    PHYSICAL EXAMINATION:  Vitals:  BP (!) 136/93   Pulse 96   Ht 6' 5" (1.956 m)   Wt 107.2 kg (236 lb 6 oz)   BMI 28.03 kg/m²    General: Well-developed well-nourished 49 y.o. malein no acute distress   Cardiovascular: Regular rhythm by palpation of distal pulse, normal color and temperature, no concerning varicosities on symptomatic side   Lungs: No labored breathing or wheezing appreciated   Neuro: Alert and oriented ×3   Psychiatric: well oriented to person, place and time, demonstrates normal mood and affect   Skin: No rashes, lesions or ulcers, normal temperature, turgor, and texture on uninvolved extremity    Ortho/SPM Exam  Exam of the left shoulder demonstrates well-healed arthroscopic incisions.  No biceps deformity.  Active forward elevation scapular plane to 145, passive to 160-170 with some mild tightness.  Active external rotation with arm at side to 50, passive to 60-70.  Internal rotation to T12.  Mild stiffness.  Good cuff activation.  No significant pain or weakness on mild resisted scaption.  No scapular winging.  Mild " scapular dyskinesis.    IMAGING:  None     ASSESSMENT:      ICD-10-CM ICD-9-CM   1. Shoulder weakness  R29.898 719.61     PLAN:     Clinically making progress but has missed therapy over the last month.  Would benefit from additional therapy for strengthening.  May begin the strengthening phase of his recovery now.  No lifting more than 5 lb at this time.  Discussed activities to avoid.  It sounds like he has been laid off from his job and he seems to be okay with that.  He understands that it will be about 6 months before he is fully cleared for all lifting and unrestricted activity.  I will see him back in 2 months.  All questions answered.  He understands that he needs to get back into physical therapy and we will make a new appointment today.  All questions answered.    Procedures

## 2024-04-30 ENCOUNTER — OFFICE VISIT (OUTPATIENT)
Dept: SPORTS MEDICINE | Facility: CLINIC | Age: 49
End: 2024-04-30
Payer: COMMERCIAL

## 2024-04-30 VITALS
DIASTOLIC BLOOD PRESSURE: 93 MMHG | HEART RATE: 96 BPM | SYSTOLIC BLOOD PRESSURE: 136 MMHG | WEIGHT: 236.38 LBS | HEIGHT: 77 IN | BODY MASS INDEX: 27.91 KG/M2

## 2024-04-30 DIAGNOSIS — R29.898 SHOULDER WEAKNESS: Primary | ICD-10-CM

## 2024-04-30 PROCEDURE — 3075F SYST BP GE 130 - 139MM HG: CPT | Mod: CPTII,S$GLB,, | Performed by: ORTHOPAEDIC SURGERY

## 2024-04-30 PROCEDURE — 3008F BODY MASS INDEX DOCD: CPT | Mod: CPTII,S$GLB,, | Performed by: ORTHOPAEDIC SURGERY

## 2024-04-30 PROCEDURE — 1159F MED LIST DOCD IN RCRD: CPT | Mod: CPTII,S$GLB,, | Performed by: ORTHOPAEDIC SURGERY

## 2024-04-30 PROCEDURE — 3080F DIAST BP >= 90 MM HG: CPT | Mod: CPTII,S$GLB,, | Performed by: ORTHOPAEDIC SURGERY

## 2024-04-30 PROCEDURE — 99213 OFFICE O/P EST LOW 20 MIN: CPT | Mod: S$GLB,,, | Performed by: ORTHOPAEDIC SURGERY

## 2024-04-30 PROCEDURE — 99999 PR PBB SHADOW E&M-EST. PATIENT-LVL III: CPT | Mod: PBBFAC,,, | Performed by: ORTHOPAEDIC SURGERY

## 2024-06-01 DIAGNOSIS — Z98.890 S/P LEFT ROTATOR CUFF REPAIR: ICD-10-CM

## 2024-06-03 RX ORDER — CELECOXIB 200 MG/1
200 CAPSULE ORAL 2 TIMES DAILY
Qty: 56 CAPSULE | Refills: 1 | Status: SHIPPED | OUTPATIENT
Start: 2024-06-03

## 2024-06-17 ENCOUNTER — TELEPHONE (OUTPATIENT)
Dept: SPORTS MEDICINE | Facility: CLINIC | Age: 49
End: 2024-06-17
Payer: COMMERCIAL

## 2024-06-17 RX ORDER — METHOCARBAMOL 500 MG/1
500 TABLET, FILM COATED ORAL 4 TIMES DAILY
Qty: 28 TABLET | Refills: 0 | Status: SHIPPED | OUTPATIENT
Start: 2024-06-17 | End: 2024-06-24

## 2024-06-17 NOTE — TELEPHONE ENCOUNTER
Spoke to patient in regards to message, we will keep his 5M appointment the same and sent Kevyn his message in regards to refill request. He understands she's will either call or message him back. Patient had no further questions

## 2024-06-30 NOTE — PROGRESS NOTES
CC: Left shoulder post-op follow up     DATE OF PROCEDURE: 1/10/2024   OPERATION:   Left  1. Shoulder arthroscopic rotator cuff repair (CPT 38943)  2. Shoulder open subpectoral biceps tenodesis (CPT 00129)  3. Shoulder arthroscopic extensive debridement (CPT 49564)    4. Shoulder arthroscopic subacromial decompression      Farzad Moore presents today for follow up appointment of his left shoulder. Patient is now 5 months 3 weeks status post above procedure. Last PT session at [facility] was 04/01/24.  Postop course complicated by the patient losing his job and health insurance coverage.  He is now on Medicaid and is planning to get back into formal PT in the next few weeks.  He also reports an event about a month ago where he caught a child who was coming out of a bounce house and afterwards has some soreness in the shoulder which persisted for a few weeks.  Responded well to ice and conservative treatment.  He did not feel a pop or anything discretely injured at the moment.  Has some very minimal intermittent transient soreness in the shoulder but overall remains much better compared to preop.  I do not gather that he is following much of a home therapy program.    Prior Hx 4/30/2024:  Farzad Moore presents today for follow up appointment of his left shoulder. Patient is now 3 months 20 days status post above procedure. Last PT session 04/01/24 at [facility]. He states he missed PT last week due to being out of town. He also states that he has been laid off from his work as of a few days ago and is currently trying to figure out insurance and unemployment.  Per chart review his last physical therapy appointment was 4/1/24.  He denies any pain in the shoulder.  Making progress by his account.    Prior Hx 2/22/2024:   Farzad Moore reports to be doing well 6 weeks 1 day s/p the above mentioned procedure. He was evaluated at an external urgent care 02/20/24 due to lateral portal wound concern. He  "was prescribed doxycycline. He reports he initially had his sutures removed early and then had the steri-strips replaced at that time and has had them up until a few days ago. Still presents with a steri-strip today at the posterior portal.  Denies fevers, chills, night sweats, chest pain, difficulty breathing, calf pain or tenderness. Continues PT at [place] location with Oton[facility]. Seeing good progress daily. Pain levels are improving and in fact he states that the shoulder feels "night and day" better compared to preop. Denies any pain at rest, denies any pain with passive range of motion throughout his arc of motion.    PAST MEDICAL HISTORY:   Past Medical History:   Diagnosis Date    ADHD     Right inguinal hernia 12/14/2018     PAST SURGICAL HISTORY:  Past Surgical History:   Procedure Laterality Date    ARTHROSCOPIC DEBRIDEMENT OF SHOULDER Left 01/10/2024    Procedure: DEBRIDEMENT, SHOULDER, ARTHROSCOPIC- POLAR CARE;  Surgeon: CLOTILDE Farris MD;  Location: [facility];  Service: Orthopedics;  Laterality: Left;  REGIONAL WITHOUT Catheter, Interscalene  0.5% [place] on field    ARTHROSCOPIC REPAIR OF ROTATOR CUFF OF SHOULDER Left 01/10/2024    Procedure: REPAIR, ROTATOR CUFF, ARTHROSCOPIC;  Surgeon: CLOTILDE Farris MD;  Location: [facility] OR;  Service: Orthopedics;  Laterality: Left;    ARTHROSCOPY OF SHOULDER WITH DECOMPRESSION OF SUBACROMIAL SPACE Left 01/10/2024    Procedure: ARTHROSCOPY, SHOULDER, WITH SUBACROMIAL SPACE DECOMPRESSION;  Surgeon: CLOTILDE Farris MD;  Location: [facility] OR;  Service: Orthopedics;  Laterality: Left;    ARTHROSCOPY,SHOULDER,WITH BICEPS TENODESIS Left 01/10/2024    Procedure: ARTHROSCOPY,SHOULDER,WITH BICEPS TENODESIS;  Surgeon: CLOTILDE Farris MD;  Location: [facility] OR;  Service: Orthopedics;  Laterality: Left;  REGIONAL WITHOUT Catheter, Interscalene  0.5% [place] on field    ESOPHAGOGASTRODUODENOSCOPY N/A 11/11/2022    Procedure: EGD (ESOPHAGOGASTRODUODENOSCOPY);  " "Surgeon: Baylee Flores MD;  Location: [facility];  Service: Endoscopy;  Laterality: N/A;    HERNIA REPAIR      x2, inguinal    KNEE SURGERY Right      FAMILY HISTORY:  Family History   Problem Relation Name Age of Onset    Diabetes Mother          diet controlled    Heart attack Father  38        x 2.  Hit head and had a brain bleed.    Lung cancer Father      Heart attack Paternal Uncle  50    Colon cancer Neg Hx      Crohn's disease Neg Hx      Ulcerative colitis Neg Hx      Prostate cancer Neg Hx      Stroke Neg Hx       MEDICATIONS:    Current Outpatient Medications:     celecoxib (CELEBREX) 200 MG capsule, Take 1 capsule (200 mg total) by mouth 2 (two) times daily., Disp: 56 capsule, Rfl: 1    clonazePAM (KLONOPIN) 1 MG tablet, Take 1.5 mg by mouth every evening., Disp: , Rfl:     dextroamphetamine-amphetamine (ADDERALL XR) 30 MG 24 hr capsule, Take 30 mg by mouth 3 (three) times daily., Disp: , Rfl:     fluticasone propionate (FLONASE) 50 mcg/actuation nasal spray, 1 spray by Each Nostril route once daily., Disp: , Rfl:     omeprazole (PRILOSEC) 40 MG capsule, Take 1 capsule (40 mg total) by mouth once daily., Disp: 30 capsule, Rfl: 5    ALLERGIES:  Review of patient's allergies indicates:  No Known Allergies     REVIEW OF SYSTEMS:  Constitution: Negative. Negative for chills, fever and night sweats.    Hematologic/Lymphatic: Negative for bleeding problem. Does not bruise/bleed easily.   Skin: Negative for dry skin, itching and rash.   Musculoskeletal: Negative for falls.  Negative for left shoulder pain and muscle weakness.     All other review of symptoms were reviewed and found to be noncontributory.    PHYSICAL EXAMINATION:  Vitals:  BP (!) 131/92   Pulse 86   Ht 6' 5" (1.956 m)   Wt 107.2 kg (236 lb 5.3 oz)   BMI 28.03 kg/m²    General: Well-developed well-nourished 49 y.o. malein no acute distress   Cardiovascular: Regular rhythm by palpation of distal pulse, normal color and temperature, no " concerning varicosities on symptomatic side   Lungs: No labored breathing or wheezing appreciated   Neuro: Alert and oriented ×3   Psychiatric: well oriented to person, place and time, demonstrates normal mood and affect   Skin: No rashes, lesions or ulcers, normal temperature, turgor, and texture on uninvolved extremity    Ortho/SPM Exam  Exam of the left shoulder demonstrates well-healed arthroscopic incisions.  No biceps deformity.  Active forward elevation scapular plane to 150, passive to 160-170 with some mild tightness.  Active external rotation with arm at side to 50, passive to 60.  Internal rotation to T12.  Mild stiffness.  Good cuff activation.  No significant pain or weakness on resisted scaption.  No scapular winging.  Mild scapular dyskinesis.    IMAGING:  None     ASSESSMENT:      ICD-10-CM ICD-9-CM   1. Shoulder weakness  R29.898 719.61   2. S/P left rotator cuff repair  Z98.890 V45.89     PLAN:     Clinically the patient is doing fairly well overall.  Postoperative course complicated as noted above.  Limited therapy to this point.  Home education program provided today.  I would like to get the patient back into formal therapy at [place].  Discussed activities to avoid.  He would like to return to work eventually but this does require some strenuous activity at times.  He will need some degree of functional strengthening and hardening beforehand.  I will see him back in 2 months for possible clearance at that time.  All questions answered.  He remains quite happy with how her shoulder feels compared to preop.    HEP 02157 - W. Carlos Farris MD and SMA Alana, instructed and demonstrated a periscapular and rotator cuff strengthening HEP. Instruction and demonstration of an AAROM and stretching HEP was also performed. The patient then demonstrated understanding of exercises and proper technique. This program was performed for 15 minutes.      Procedures

## 2024-07-01 ENCOUNTER — OFFICE VISIT (OUTPATIENT)
Dept: SPORTS MEDICINE | Facility: CLINIC | Age: 49
End: 2024-07-01
Payer: MEDICAID

## 2024-07-01 VITALS
SYSTOLIC BLOOD PRESSURE: 131 MMHG | DIASTOLIC BLOOD PRESSURE: 92 MMHG | HEART RATE: 86 BPM | HEIGHT: 77 IN | BODY MASS INDEX: 27.9 KG/M2 | WEIGHT: 236.31 LBS

## 2024-07-01 DIAGNOSIS — Z98.890 S/P LEFT ROTATOR CUFF REPAIR: ICD-10-CM

## 2024-07-01 DIAGNOSIS — R29.898 SHOULDER WEAKNESS: Primary | ICD-10-CM

## 2024-07-01 PROCEDURE — 3008F BODY MASS INDEX DOCD: CPT | Mod: CPTII,,, | Performed by: ORTHOPAEDIC SURGERY

## 2024-07-01 PROCEDURE — 99213 OFFICE O/P EST LOW 20 MIN: CPT | Mod: S$PBB,,, | Performed by: ORTHOPAEDIC SURGERY

## 2024-07-01 PROCEDURE — 99999 PR PBB SHADOW E&M-EST. PATIENT-LVL III: CPT | Mod: PBBFAC,,, | Performed by: ORTHOPAEDIC SURGERY

## 2024-07-01 PROCEDURE — 1159F MED LIST DOCD IN RCRD: CPT | Mod: CPTII,,, | Performed by: ORTHOPAEDIC SURGERY

## 2024-07-01 PROCEDURE — 3075F SYST BP GE 130 - 139MM HG: CPT | Mod: CPTII,,, | Performed by: ORTHOPAEDIC SURGERY

## 2024-07-01 PROCEDURE — 99213 OFFICE O/P EST LOW 20 MIN: CPT | Mod: PBBFAC | Performed by: ORTHOPAEDIC SURGERY

## 2024-07-01 PROCEDURE — 3080F DIAST BP >= 90 MM HG: CPT | Mod: CPTII,,, | Performed by: ORTHOPAEDIC SURGERY

## 2024-07-01 PROCEDURE — 97110 THERAPEUTIC EXERCISES: CPT | Mod: ,,, | Performed by: ORTHOPAEDIC SURGERY

## 2024-07-15 ENCOUNTER — CLINICAL SUPPORT (OUTPATIENT)
Dept: REHABILITATION | Facility: HOSPITAL | Age: 49
End: 2024-07-15
Attending: ORTHOPAEDIC SURGERY
Payer: MEDICAID

## 2024-07-15 DIAGNOSIS — M25.512 CHRONIC LEFT SHOULDER PAIN: Primary | ICD-10-CM

## 2024-07-15 DIAGNOSIS — Z98.890 S/P LEFT ROTATOR CUFF REPAIR: ICD-10-CM

## 2024-07-15 DIAGNOSIS — G89.29 CHRONIC LEFT SHOULDER PAIN: Primary | ICD-10-CM

## 2024-07-15 DIAGNOSIS — R29.898 SHOULDER WEAKNESS: ICD-10-CM

## 2024-07-15 PROCEDURE — 97530 THERAPEUTIC ACTIVITIES: CPT

## 2024-07-15 PROCEDURE — 97161 PT EVAL LOW COMPLEX 20 MIN: CPT

## 2024-07-15 NOTE — PROGRESS NOTES
[facility]   Physical Therapy Initial Evaluation      Name: Farzad Moore  Clinic Number: [id number]    Therapy Diagnosis:   Encounter Diagnoses   Name Primary?    Shoulder weakness     S/P left rotator cuff repair     Chronic left shoulder pain Yes        Physician: CLOTILDE Farris MD    Physician Orders: PT Eval and Treat   Medical Diagnosis from Referral: Shoulder weakness [R29.898], S/P left rotator cuff repair [Z98.890]   Evaluation Date: 7/15/2024  Authorization Period Expiration: 7/1/2025  Plan of Care Expiration: 12/15/2024  Progress Note Due: 8/15/2024    Date of Surgery: 1/10/2024  Post Op Day: 6 months s/p    Visit # / Visits authorized: 1/1   FOTO: 1/ 3    Precautions: Standard     Time In: 8:05 AM  Time Out: 9:00 AM  Total Billable Time: 55 minutes    Subjective     Date of onset: surgery was 1/10/2024    History of current condition - Farzad reports: he had to miss a few months of PT due to being laid off of work and has been trying to work on exercises at home while he was waiting on insurance to kick in. He states about a month ago, he was at a kid's birthday party and had to catch a small child unexpectedly and then had no pain or pop in the shoulder at that point in time, but then had increased soreness the following day. Patient states he has pain only with bringing his arm up into abduction at this time. Denies numbness/tingling and states no new onset of pain or weakness. Wants to return to therapy to make sure he is still able to properly progress his motion and strength. States he has not been lifting much at home.     Falls: none    Imaging: see EMR    Prior Therapy: January-April; had to pause for insurance reasons  Social History:  lives with their family  Occupation: unemployed at this time  Prior Level of Function: indep prior to surgery  Current Level of Function: limited with some motions (ER at neutral and abduction overhead)     Pain:  Current 2/10,  worst 4/10, best 2/10   Location: left shoulder  Description: Aching, Dull, Grabbing, Tight, and Sharp  Aggravating Factors: Morning, Lifting, and overhead reaching  Easing Factors: rest    Patients goals: improve strength and motion of left shoulder      Medical History:   Past Medical History:   Diagnosis Date    ADHD     Right inguinal hernia 12/14/2018       Surgical History:   Farzad Moore  has a past surgical history that includes Hernia repair; Knee surgery (Right); Esophagogastroduodenoscopy (N/A, 11/11/2022); arthroscopy,shoulder,with biceps tenodesis (Left, 01/10/2024); Arthroscopic debridement of shoulder (Left, 01/10/2024); Arthroscopic repair of rotator cuff of shoulder (Left, 01/10/2024); and Arthroscopy of shoulder with decompression of subacromial space (Left, 01/10/2024).    Medications:   Farzad has a current medication list which includes the following prescription(s): celecoxib, clonazepam, dextroamphetamine-amphetamine, fluticasone propionate, and omeprazole.    Allergies:   Review of patient's allergies indicates:  No Known Allergies     Objective      Observation:  alert, oriented, calm     Posture: downward rotation Left scap > Right; depressed left scap > right     Passive Range of Motion:   Shoulder Left   Flexion 170   Abduction 160   ER at 20 55   IR 30      Active Range of Motion:   Shoulder Right Left   Flexion 180 170   Abduction 175 165 *   ER at 0 70 45   ER at 90 95 60   IR (behind back) 40 30     Upper Extremity Strength:   Shoulder Right Left   Flexion 4+/5 4-/5   Abduction 4/5 3/5 *   ER at 0 4+/5 3+/5   ER at 90 4/5 NT   IR  4+/5 4-/5          Joint Mobility: limited posterior GH and limited inferior GH capsular mobility    Palpation:  no TTP noted    Sensation: Intact light touch bilaterally       Intake Outcome Measure for FOTO Shoulder Survey    Therapist reviewed FOTO scores for Farzad Moore on 7/15/2024.   FOTO report - see Media section or FOTO account episode  details.    Intake Score: 65%         Treatment     Total Treatment time (time-based codes) separate from Evaluation: 30 minutes     Farzad received the treatments listed below:      manual therapy techniques: Joint mobilizations and Soft tissue Mobilization were applied to the: left shoulder for 10 minutes, including:    Posterior GH mobs, grades II/III  Posterior/inferior GH mobs, Grades II/III  Inferior mobs with GH ER, grades II/III      therapeutic activities to improve functional performance for 20 minutes, including:    HEP:  Serratus wall slides, 2 x 15 with cues for upward rotation  Upper trap shrugs level 3, 10 x 10 sec holds   IR and ER walkouts with shoulder flexion, 3 x 10     Education:  - HEP compliance   - Role of PT   - POC/Prognosis     Patient Education and Home Exercises     Education provided:   - HEP    Written Home Exercises Provided: yes. Exercises were reviewed and Farzad was able to demonstrate them prior to the end of the session.  Farzad demonstrated good  understanding of the education provided. See EMR under Patient Instructions for exercises provided during therapy sessions.    Assessment     Farzad is a 49 y.o. male referred to outpatient Physical Therapy with a medical diagnosis of Shoulder weakness [R29.898], S/P left rotator cuff repair [Z98.890]. Patient presents 6 months s/p Left RTC repair with decreased and painful shoulder range of motion, but has been unable to attend therapy sessions due to recent external life factors. Patient has pain with abduction due to rotator cuff weakness and postural deficits limiting his upward rotation. Patient would benefit from skilled therapy to continue working on progress following his recent postoperative repair.     Patient prognosis is Good.   Patient will benefit from skilled outpatient Physical Therapy to address the deficits stated above and in the chart below, provide patient /family education, and to maximize patientt's level of  independence.     Plan of care discussed with patient: Yes  Patient's spiritual, cultural and educational needs considered and patient is agreeable to the plan of care and goals as stated below:     Anticipated Barriers for therapy: none    Medical Necessity is demonstrated by the following  History  Co-morbidities and personal factors that may impact the plan of care [] LOW: no personal factors / co-morbidities  [x] MODERATE: 1-2 personal factors / co-morbidities  [] HIGH: 3+ personal factors / co-morbidities    Moderate / High Support Documentation:   Co-morbidities affecting plan of care: ADHD    Personal Factors:   no deficits     Examination  Body Structures and Functions, activity limitations and participation restrictions that may impact the plan of care [] LOW: addressing 1-2 elements  [x] MODERATE: 3+ elements  [] HIGH: 4+ elements (please support below)    Moderate / High Support Documentation: see above assessment      Clinical Presentation [x] LOW: stable  [] MODERATE: Evolving  [] HIGH: Unstable     Decision Making/ Complexity Score: low       Goals:  Short Term Goals: 4-6 weeks  1.Report decreased shoulder pain < / =  1/10  to increase tolerance for exercise  2. Increase PROM full ROM to show soft tissue and joint mobility improvement.   3. Pt to tolerate HEP to improve ROM and independence with ADL's     Long Term Goals: 16 to 20 weeks  1.Report decreased shoulder pain  < / =  0 /10  to increase tolerance for ADLs  2.Increase AROM to equal to the contralateral limb.   3.Increase strength to >/= 4/5 in shoulder girdle to increase tolerance for ADL and work activities.  4. Pt goal: restore full function and tolerate overhead lifting of greater than 20 lbs.   5. Pt will have improved score of >/= 75% on FOTO shoulder in order to demonstrate true functional improvement.   Plan     Plan of care Certification: 7/15/2024 to 12/15/2024.    Outpatient Physical Therapy 2 times weekly for 20 weeks to include the  following interventions: Manual Therapy, Moist Heat/ Ice, Neuromuscular Re-ed, Patient Education, Therapeutic Activities, and Therapeutic Exercise.     Jc Kapadia PT        Physician's Signature: _________________________________________ Date: ________________

## 2024-07-16 NOTE — PLAN OF CARE
[facility]   Physical Therapy Initial Evaluation      Name: Farzad Moore  Clinic Number: [id number]    Therapy Diagnosis:   Encounter Diagnoses   Name Primary?    Shoulder weakness     S/P left rotator cuff repair     Chronic left shoulder pain Yes        Physician: CLOTILDE Farris MD    Physician Orders: PT Eval and Treat   Medical Diagnosis from Referral: Shoulder weakness [R29.898], S/P left rotator cuff repair [Z98.890]   Evaluation Date: 7/15/2024  Authorization Period Expiration: 7/1/2025  Plan of Care Expiration: 12/15/2024  Progress Note Due: 8/15/2024    Date of Surgery: 1/10/2024  Post Op Day: 6 months s/p    Visit # / Visits authorized: 1/1   FOTO: 1/ 3    Precautions: Standard     Time In: 8:05 AM  Time Out: 9:00 AM  Total Billable Time: 55 minutes    Subjective     Date of onset: surgery was 1/10/2024    History of current condition - Farzad reports: he had to miss a few months of PT due to being laid off of work and has been trying to work on exercises at home while he was waiting on insurance to kick in. He states about a month ago, he was at a kid's birthday party and had to catch a small child unexpectedly and then had no pain or pop in the shoulder at that point in time, but then had increased soreness the following day. Patient states he has pain only with bringing his arm up into abduction at this time. Denies numbness/tingling and states no new onset of pain or weakness. Wants to return to therapy to make sure he is still able to properly progress his motion and strength. States he has not been lifting much at home.     Falls: none    Imaging: see EMR    Prior Therapy: January-April; had to pause for insurance reasons  Social History:  lives with their family  Occupation: unemployed at this time  Prior Level of Function: indep prior to surgery  Current Level of Function: limited with some motions (ER at neutral and abduction overhead)     Pain:  Current 2/10,  worst 4/10, best 2/10   Location: left shoulder  Description: Aching, Dull, Grabbing, Tight, and Sharp  Aggravating Factors: Morning, Lifting, and overhead reaching  Easing Factors: rest    Patients goals: improve strength and motion of left shoulder      Medical History:   Past Medical History:   Diagnosis Date    ADHD     Right inguinal hernia 12/14/2018       Surgical History:   Farzad Moore  has a past surgical history that includes Hernia repair; Knee surgery (Right); Esophagogastroduodenoscopy (N/A, 11/11/2022); arthroscopy,shoulder,with biceps tenodesis (Left, 01/10/2024); Arthroscopic debridement of shoulder (Left, 01/10/2024); Arthroscopic repair of rotator cuff of shoulder (Left, 01/10/2024); and Arthroscopy of shoulder with decompression of subacromial space (Left, 01/10/2024).    Medications:   Farzad has a current medication list which includes the following prescription(s): celecoxib, clonazepam, dextroamphetamine-amphetamine, fluticasone propionate, and omeprazole.    Allergies:   Review of patient's allergies indicates:  No Known Allergies     Objective      Observation:  alert, oriented, calm     Posture: downward rotation Left scap > Right; depressed left scap > right     Passive Range of Motion:   Shoulder Left   Flexion 170   Abduction 160   ER at 20 55   IR 30      Active Range of Motion:   Shoulder Right Left   Flexion 180 170   Abduction 175 165 *   ER at 0 70 45   ER at 90 95 60   IR (behind back) 40 30     Upper Extremity Strength:   Shoulder Right Left   Flexion 4+/5 4-/5   Abduction 4/5 3/5 *   ER at 0 4+/5 3+/5   ER at 90 4/5 NT   IR  4+/5 4-/5          Joint Mobility: limited posterior GH and limited inferior GH capsular mobility    Palpation:  no TTP noted    Sensation: Intact light touch bilaterally       Intake Outcome Measure for FOTO Shoulder Survey    Therapist reviewed FOTO scores for Farzad Moore on 7/15/2024.   FOTO report - see Media section or FOTO account episode  details.    Intake Score: 65%         Treatment     Total Treatment time (time-based codes) separate from Evaluation: 30 minutes     Farzad received the treatments listed below:      manual therapy techniques: Joint mobilizations and Soft tissue Mobilization were applied to the: left shoulder for 10 minutes, including:    Posterior GH mobs, grades II/III  Posterior/inferior GH mobs, Grades II/III  Inferior mobs with GH ER, grades II/III      therapeutic activities to improve functional performance for 20 minutes, including:    HEP:  Serratus wall slides, 2 x 15 with cues for upward rotation  Upper trap shrugs level 3, 10 x 10 sec holds   IR and ER walkouts with shoulder flexion, 3 x 10     Education:  - HEP compliance   - Role of PT   - POC/Prognosis     Patient Education and Home Exercises     Education provided:   - HEP    Written Home Exercises Provided: yes. Exercises were reviewed and Farzad was able to demonstrate them prior to the end of the session.  Farzad demonstrated good  understanding of the education provided. See EMR under Patient Instructions for exercises provided during therapy sessions.    Assessment     Farzad is a 49 y.o. male referred to outpatient Physical Therapy with a medical diagnosis of Shoulder weakness [R29.898], S/P left rotator cuff repair [Z98.890]. Patient presents 6 months s/p Left RTC repair with decreased and painful shoulder range of motion, but has been unable to attend therapy sessions due to recent external life factors. Patient has pain with abduction due to rotator cuff weakness and postural deficits limiting his upward rotation. Patient would benefit from skilled therapy to continue working on progress following his recent postoperative repair.     Patient prognosis is Good.   Patient will benefit from skilled outpatient Physical Therapy to address the deficits stated above and in the chart below, provide patient /family education, and to maximize patientt's level of  independence.     Plan of care discussed with patient: Yes  Patient's spiritual, cultural and educational needs considered and patient is agreeable to the plan of care and goals as stated below:     Anticipated Barriers for therapy: none    Medical Necessity is demonstrated by the following  History  Co-morbidities and personal factors that may impact the plan of care [] LOW: no personal factors / co-morbidities  [x] MODERATE: 1-2 personal factors / co-morbidities  [] HIGH: 3+ personal factors / co-morbidities    Moderate / High Support Documentation:   Co-morbidities affecting plan of care: ADHD    Personal Factors:   no deficits     Examination  Body Structures and Functions, activity limitations and participation restrictions that may impact the plan of care [] LOW: addressing 1-2 elements  [x] MODERATE: 3+ elements  [] HIGH: 4+ elements (please support below)    Moderate / High Support Documentation: see above assessment      Clinical Presentation [x] LOW: stable  [] MODERATE: Evolving  [] HIGH: Unstable     Decision Making/ Complexity Score: low       Goals:  Short Term Goals: 4-6 weeks  1.Report decreased shoulder pain < / =  1/10  to increase tolerance for exercise  2. Increase PROM full ROM to show soft tissue and joint mobility improvement.   3. Pt to tolerate HEP to improve ROM and independence with ADL's     Long Term Goals: 16 to 20 weeks  1.Report decreased shoulder pain  < / =  0 /10  to increase tolerance for ADLs  2.Increase AROM to equal to the contralateral limb.   3.Increase strength to >/= 4/5 in shoulder girdle to increase tolerance for ADL and work activities.  4. Pt goal: restore full function and tolerate overhead lifting of greater than 20 lbs.   5. Pt will have improved score of >/= 75% on FOTO shoulder in order to demonstrate true functional improvement.   Plan     Plan of care Certification: 7/15/2024 to 12/15/2024.    Outpatient Physical Therapy 2 times weekly for 20 weeks to include the  following interventions: Manual Therapy, Moist Heat/ Ice, Neuromuscular Re-ed, Patient Education, Therapeutic Activities, and Therapeutic Exercise.     Jc Kapadia PT        Physician's Signature: _________________________________________ Date: ________________

## 2024-07-19 ENCOUNTER — CLINICAL SUPPORT (OUTPATIENT)
Dept: REHABILITATION | Facility: HOSPITAL | Age: 49
End: 2024-07-19
Payer: MEDICAID

## 2024-07-19 DIAGNOSIS — M25.512 CHRONIC LEFT SHOULDER PAIN: Primary | ICD-10-CM

## 2024-07-19 DIAGNOSIS — G89.29 CHRONIC LEFT SHOULDER PAIN: Primary | ICD-10-CM

## 2024-07-19 PROCEDURE — 97110 THERAPEUTIC EXERCISES: CPT | Performed by: PHYSICAL THERAPIST

## 2024-07-20 NOTE — PROGRESS NOTES
[facility]   Physical Therapy Treatment Note      Name: Farzad Moore  [facility] Number: [id number]    Therapy Diagnosis:   Encounter Diagnosis   Name Primary?    Chronic left shoulder pain Yes         Physician: Clay Pierson PA-C    Visit Date: 7/19/2024    Physician Orders: PT Eval and Treat   Medical Diagnosis from Referral: Shoulder weakness [R29.898], S/P left rotator cuff repair [Z98.890]   Evaluation Date: 7/15/2024  Authorization Period Expiration: 7/1/2025  Plan of Care Expiration: 12/15/2024  Progress Note Due: 8/15/2024     Date of Surgery: 1/10/2024  Post Op Day: 6 months s/p     Visit # / Visits authorized: 19/30  FOTO: 1/ 3     Precautions: Standard,      OPERATION: 1/10/2024  Left  1. Shoulder arthroscopic rotator cuff repair (CPT 90175)  2. Shoulder open subpectoral biceps tenodesis (CPT 44466)  3. Shoulder arthroscopic extensive debridement (CPT 89644)    4. Shoulder arthroscopic subacromial decompression      Physical Therapy: Follow the < 3 cm cuff repair rehab protocol. PROM starts immediately given the patient's increased risk in this case for postoperative stiffness.. AROM starts after 6 weeks. No cuff resistive activity x 12 weeks. No biceps resistive activity x 8 weeks. Sling and pillow immobilization for 6 weeks.             PTA Visit #: 0/5     Time In: 0800  Time Out: 0905  Total Billable Time: 55 minutes     Subjective     Pt reports: Was able to work on his HEP without pain.    He was compliant with home exercise program.  Response to previous treatment: Improved mobility  Functional change: NA    Pain: 1/10  Location: left shoulder      Objective      Objective Measures updated at progress report unless specified.     DOS: 1/10/2024      Passive Range of Motion:   Shoulder Left   Flexion 170   Abduction 160   ER at 20 55   IR 30      Active Range of Motion:   Shoulder Right Left   Flexion 180 170   Abduction 175 165 *   ER at 0 70 45   ER at 90 95 60   IR  "(behind back) 40 30            Treatment     Physical Therapy Tawnya assisted with todays treatment. Tawnya was in direct line of sight supervision by supervising PT during this time.     ALL BILLED AS THERE EX PER MEDICAID GUIDELINES    Farzad received the treatments listed below:      therapeutic exercises to develop ROM, strength, flexibility, endurance for 45' minutes including:  Supine shoulder flexion ROM 10 lbs double arm 20x  Supine shoulder ER AROM 5 lbs 20x  Prone T and Y 3 x burn, 5" Hold  Serratus wall slides, 2 x 15 with cues for upward rotation - pull off for posterior tilt  Upper trap shrugs level 3, 10 x 10 sec holds   Wall slide with YTB 3 x burn    manual therapy techniques: Joint mobilizations and Soft tissue Mobilization were applied to the: left shoulder for 10 minutes, including:     Posterior GH mobs, grades II/III  Posterior/inferior GH mobs, Grades II/III  Inferior mobs with GH ER, grades II/III  Scapular mobilizations upward rotation and posterior tilt         Patient Education and Home Exercises       Education provided:   Sling weaning      Written Home Exercises Provided: yes. Exercises were reviewed and Farzad was able to demonstrate them prior to the end of the session.  Farzad demonstrated good  understanding of the education provided. See EMR under Patient Instructions for exercises provided during therapy sessions.    Assessment   Showed good recall of HEP. Progressed PROM flexion and ER HEP. Increased exercises with periscapular musculature.     Farzad Is progressing well towards his goals.   Pt prognosis is Good.     Pt will continue to benefit from skilled outpatient physical therapy to address the deficits listed in the problem list box on initial evaluation, provide pt/family education and to maximize pt's level of independence in the home and community environment.     Pt's spiritual, cultural and educational needs considered and pt agreeable to plan of care and " goals.     Anticipated barriers to physical therapy: None    GOALS: Short Term Goals: 6 weeks (met)  1.Report decreased shoulder pain < / =  1/10  to increase tolerance for exercise  2. Increase PROM full ROM  4. Pt to tolerate HEP to improve ROM and independence with ADL's     Long Term Goals: 12 to 24 weeks  1.Report decreased shoulder pain  < / =  0 /10  to increase tolerance for ADLs (in progress, not met)  2.Increase AROM to equal to the contralateral limb (in progress, not met)  3.Increase strength to >/= 4/5 in shoulder girdle to increase tolerance for ADL and work activities. (in progress, not met)  4. Pt goal: restore full function (in progress, not met)  5. Pt will have improved gcode of CJ (20-40% limited) on FOTO shoulder in order to demonstrate true functional improvement. (in progress, not met)    Plan     Plan of care Certification: 1/12/2024 to 5/31/2024.    Focus on improving ROM and normalizing cuff function. No strengthening until 12 weeks post op.     Outpatient Physical Therapy 2 times weekly for 10 weeks to include the following interventions: manual therapy, therapeutic exercise, therapeutic activities, and neuromuscular re-education.     Otoniel Prasad, PT, DPT

## 2024-07-23 ENCOUNTER — CLINICAL SUPPORT (OUTPATIENT)
Dept: REHABILITATION | Facility: HOSPITAL | Age: 49
End: 2024-07-23
Payer: MEDICAID

## 2024-07-23 DIAGNOSIS — M25.512 CHRONIC LEFT SHOULDER PAIN: Primary | ICD-10-CM

## 2024-07-23 DIAGNOSIS — G89.29 CHRONIC LEFT SHOULDER PAIN: Primary | ICD-10-CM

## 2024-07-23 PROCEDURE — 97110 THERAPEUTIC EXERCISES: CPT | Performed by: PHYSICAL THERAPIST

## 2024-07-23 NOTE — PROGRESS NOTES
[facility]   Physical Therapy Treatment Note      Name: Farzad Moore  [facility] Number: [id number]    Therapy Diagnosis:   Encounter Diagnosis   Name Primary?    Chronic left shoulder pain Yes         Physician: Clay Pierson PA-C    Visit Date: 7/23/2024    Physician Orders: PT Eval and Treat   Medical Diagnosis from Referral: Shoulder weakness [R29.898], S/P left rotator cuff repair [Z98.890]   Evaluation Date: 7/15/2024  Authorization Period Expiration: 7/1/2025  Plan of Care Expiration: 12/15/2024  Progress Note Due: 8/15/2024     Date of Surgery: 1/10/2024  Post Op Day: 6 months s/p     Visit # / Visits authorized: 20/30  FOTO: 1/ 3     Precautions: Standard,      OPERATION: 1/10/2024  Left  1. Shoulder arthroscopic rotator cuff repair (CPT 22612)  2. Shoulder open subpectoral biceps tenodesis (CPT 96797)  3. Shoulder arthroscopic extensive debridement (CPT 29504)    4. Shoulder arthroscopic subacromial decompression      Physical Therapy: Follow the < 3 cm cuff repair rehab protocol. PROM starts immediately given the patient's increased risk in this case for postoperative stiffness.. AROM starts after 6 weeks. No cuff resistive activity x 12 weeks. No biceps resistive activity x 8 weeks. Sling and pillow immobilization for 6 weeks.             PTA Visit #: 0/5     Time In: 1255  Time Out: 1350  Total Billable Time: 55 minutes     Subjective     Pt reports: Felt good after the previous session. Some muscle soreness.    He was compliant with home exercise program.  Response to previous treatment: Improved mobility  Functional change: NA    Pain: 1/10  Location: left shoulder      Objective      Objective Measures updated at progress report unless specified.     DOS: 1/10/2024      Passive Range of Motion:   Shoulder Left   Flexion 170   Abduction 160   ER at 20 55   IR 30      Active Range of Motion:   Shoulder Right Left   Flexion 180 170   Abduction 175 165 *   ER at 0 70 45  "  ER at 90 95 60   IR (behind back) 40 30            Treatment     Physical Therapy Tawnya assisted with todays treatment. Tawnya was in direct line of sight supervision by supervising PT during this time.     ALL BILLED AS THERE EX PER MEDICAID GUIDELINES    Farzad received the treatments listed below:      therapeutic exercises to develop ROM, strength, flexibility, endurance for 45' minutes including:  Supine shoulder flexion ROM 10 lbs double arm 20x  Supine shoulder ER AROM 5 lbs 20x  Prone T and Y 3 x burn, 5" Hold  Serratus wall slides, 2 x 15 with cues for upward rotation - pull off for posterior tilt  Upper trap shrugs level 3, 10 x 10 sec holds   Wall slide with YTB 3 x burn  ER 3 x burn with OTB  ER 90/90 arm supported 1 lbs  IR 3 x burn GTB      manual therapy techniques: Joint mobilizations and Soft tissue Mobilization were applied to the: left shoulder for 10 minutes, including:     Posterior GH mobs, grades II/III  Posterior/inferior GH mobs, Grades II/III  Inferior mobs with GH ER, grades II/III  Scapular mobilizations upward rotation and posterior tilt    Patient Education and Home Exercises       Education provided:   Sling weaning      Written Home Exercises Provided: yes. Exercises were reviewed and Farzad was able to demonstrate them prior to the end of the session.  Farzad demonstrated good  understanding of the education provided. See EMR under Patient Instructions for exercises provided during therapy sessions.    Assessment   Improved mobility and scapular control today. Doing well overall. Progressing with RTC and periscapular strengthening.     Farzad Is progressing well towards his goals.   Pt prognosis is Good.     Pt will continue to benefit from skilled outpatient physical therapy to address the deficits listed in the problem list box on initial evaluation, provide pt/family education and to maximize pt's level of independence in the home and community environment. "     Pt's spiritual, cultural and educational needs considered and pt agreeable to plan of care and goals.     Anticipated barriers to physical therapy: None    GOALS: Short Term Goals: 6 weeks (met)  1.Report decreased shoulder pain < / =  1/10  to increase tolerance for exercise  2. Increase PROM full ROM  4. Pt to tolerate HEP to improve ROM and independence with ADL's     Long Term Goals: 12 to 24 weeks  1.Report decreased shoulder pain  < / =  0 /10  to increase tolerance for ADLs (in progress, not met)  2.Increase AROM to equal to the contralateral limb (in progress, not met)  3.Increase strength to >/= 4/5 in shoulder girdle to increase tolerance for ADL and work activities. (in progress, not met)  4. Pt goal: restore full function (in progress, not met)  5. Pt will have improved gcode of CJ (20-40% limited) on FOTO shoulder in order to demonstrate true functional improvement. (in progress, not met)    Plan     Plan of care Certification: 1/12/2024 to 5/31/2024.    Focus on improving ROM and normalizing cuff function. No strengthening until 12 weeks post op.     Outpatient Physical Therapy 2 times weekly for 10 weeks to include the following interventions: manual therapy, therapeutic exercise, therapeutic activities, and neuromuscular re-education.     Otoniel Prasad, PT, DPT

## 2024-09-19 ENCOUNTER — PATIENT MESSAGE (OUTPATIENT)
Dept: PRIMARY CARE CLINIC | Facility: CLINIC | Age: 49
End: 2024-09-19
Payer: MEDICAID

## 2024-11-12 ENCOUNTER — TELEPHONE (OUTPATIENT)
Dept: PRIMARY CARE CLINIC | Facility: CLINIC | Age: 49
End: 2024-11-12
Payer: MEDICAID

## 2024-11-12 NOTE — TELEPHONE ENCOUNTER
----- Message from Irma sent at 2024  2:14 PM CST -----  Regardinnd message  Contact: [phone]  Patient called, requested a courtesy call from Dr Martínez's nurse in regards early message, stated that patient is having a rectal preclusion and needs to be referred to a GI doctor but have to be seen by a PCP first. But patient is asking for a call back ASAP.

## 2024-11-13 ENCOUNTER — OFFICE VISIT (OUTPATIENT)
Dept: PRIMARY CARE CLINIC | Facility: CLINIC | Age: 49
End: 2024-11-13
Payer: MEDICAID

## 2024-11-13 VITALS
HEIGHT: 77 IN | SYSTOLIC BLOOD PRESSURE: 130 MMHG | DIASTOLIC BLOOD PRESSURE: 82 MMHG | OXYGEN SATURATION: 99 % | RESPIRATION RATE: 19 BRPM | HEART RATE: 92 BPM | WEIGHT: 244.69 LBS | BODY MASS INDEX: 28.89 KG/M2

## 2024-11-13 DIAGNOSIS — Z82.49 FAMILY HISTORY OF HEART DISEASE IN MALE FAMILY MEMBER BEFORE AGE 55: ICD-10-CM

## 2024-11-13 DIAGNOSIS — F90.2 ATTENTION DEFICIT HYPERACTIVITY DISORDER (ADHD), COMBINED TYPE: ICD-10-CM

## 2024-11-13 DIAGNOSIS — Z00.00 HEALTH CARE MAINTENANCE: ICD-10-CM

## 2024-11-13 DIAGNOSIS — Z12.11 COLON CANCER SCREENING: ICD-10-CM

## 2024-11-13 DIAGNOSIS — G47.00 INSOMNIA, UNSPECIFIED TYPE: ICD-10-CM

## 2024-11-13 DIAGNOSIS — Z79.899 OTHER LONG TERM (CURRENT) DRUG THERAPY: ICD-10-CM

## 2024-11-13 DIAGNOSIS — K64.8 HEMORRHOID PROLAPSE: Primary | ICD-10-CM

## 2024-11-13 PROCEDURE — 99213 OFFICE O/P EST LOW 20 MIN: CPT | Mod: PBBFAC,PN,25 | Performed by: STUDENT IN AN ORGANIZED HEALTH CARE EDUCATION/TRAINING PROGRAM

## 2024-11-13 PROCEDURE — 93005 ELECTROCARDIOGRAM TRACING: CPT | Mod: PBBFAC,PN | Performed by: INTERNAL MEDICINE

## 2024-11-13 PROCEDURE — 99999 PR PBB SHADOW E&M-EST. PATIENT-LVL III: CPT | Mod: PBBFAC,,, | Performed by: STUDENT IN AN ORGANIZED HEALTH CARE EDUCATION/TRAINING PROGRAM

## 2024-11-13 RX ORDER — HYDROCORTISONE ACETATE 25 MG/1
25 SUPPOSITORY RECTAL 2 TIMES DAILY
Qty: 24 SUPPOSITORY | Refills: 0 | Status: SHIPPED | OUTPATIENT
Start: 2024-11-13

## 2024-11-13 NOTE — PROGRESS NOTES
Assessment:       1. Hemorrhoid prolapse    2. Colon cancer screening    3. Attention deficit hyperactivity disorder (ADHD), combined type    4. Insomnia, unspecified type    5. Other long term (current) drug therapy    6. Family history of heart disease in male family member before age 55    7. Health care maintenance           Plan:     Assessment & Plan    Assessed patient's rectal discomfort, likely due to internal hemorrhoid based on symptoms  Considered patient's history of hernias and potential for lax connective tissue  Evaluated appropriateness of current Adderall dosage, noting it is elevated than typically prescribed  Considered cardiovascular risk factors given family history of early heart attack    HEMORRHOIDS:  Explained difference between internal and external hemorrhoids.  Discussed mechanism of hemorrhoid formation and treatment options.  Educated on risks of chronic steroid use for hemorrhoids.  Farzad to maintain healthy bowel habits: stay hydrated, consume adequate fiber, limit time on toilet.  Farzad to avoid straining during bowel movements.  Started Anusol suppositories, use 2 times daily for 1-2 weeks.  Follow up in 10-14 days if hemorrhoid symptoms do not improve with Anusol treatment.    UMBILICAL HERNIA:  Provided information on umbilical hernias and potential management options.    LABS:  Ordered CBC, CMP, lipid panel, A1C as fasting labs.    COLONOSCOPY SCREENING:  Ordered screening colonoscopy.    OTHER INSTRUCTIONS:  Ordered EKG.             Hemorrhoid prolapse  -     hydrocortisone (ANUSOL-HC) 25 mg suppository; Place 1 suppository (25 mg total) rectally 2 (two) times daily.  Dispense: 24 suppository; Refill: 0    Colon cancer screening  -     Case Request Endoscopy: COLONOSCOPY    Attention deficit hyperactivity disorder (ADHD), combined type    Insomnia, unspecified type  -     CBC Auto Differential; Future; Expected date: 11/13/2024  -     Comprehensive Metabolic Panel;  Future; Expected date: 11/13/2024  -     Hemoglobin A1C; Future; Expected date: 11/13/2024  -     Lipid Panel; Future; Expected date: 11/13/2024  -     TSH; Future; Expected date: 11/13/2024  -     T4, Free; Future; Expected date: 11/13/2024    Other long term (current) drug therapy  -     CBC Auto Differential; Future; Expected date: 11/13/2024  -     Comprehensive Metabolic Panel; Future; Expected date: 11/13/2024  -     Hemoglobin A1C; Future; Expected date: 11/13/2024  -     Lipid Panel; Future; Expected date: 11/13/2024  -     TSH; Future; Expected date: 11/13/2024  -     T4, Free; Future; Expected date: 11/13/2024  -     EKG 12-lead    Family history of heart disease in male family member before age 55  -     CBC Auto Differential; Future; Expected date: 11/13/2024  -     Comprehensive Metabolic Panel; Future; Expected date: 11/13/2024  -     Hemoglobin A1C; Future; Expected date: 11/13/2024  -     Lipid Panel; Future; Expected date: 11/13/2024  -     TSH; Future; Expected date: 11/13/2024  -     T4, Free; Future; Expected date: 11/13/2024  -     EKG 12-lead    Health care maintenance  -     EKG 12-lead                This note was generated with the assistance of ambient listening technology. Verbal consent was obtained by the patient and accompanying visitor(s) for the recording of patient appointment to facilitate this note. I attest to having reviewed and edited the generated note for accuracy, though some syntax or spelling errors may persist. Please contact the author of this note for any clarification.      Subjective:           Patient ID: Farzad Moore   Age:  49 y.o.  Sex: male     Chief Complaint:   Rectal Problems      History of Present Illness:    Farzad Moore is a 49 y.o. male who presents today with a chief complaint of Rectal Problems  .    History of Present Illness    CHIEF COMPLAINT:  Farzad presents with concerns about a rectal protrusion that developed approximately 1.5-2 weeks  ago, accompanied by recent GI disturbances and diarrhea.    HPI:  Farzad reports the onset of GI disturbances and diarrhea about 1.5-2 weeks ago. The following day, he noticed an unusual sensation in his rectal area and discovered a small protrusion that appears to be internal but also protrudes externally. He describes it as feeling distensible under pressure and reports discomfort without pain.    This issue arose after discontinuing Ozempic, which he had been taking for about 1.5 months for weight loss. He had gained approximately 35 lbs following left shoulder rotator cuff surgery and sought weight loss treatment at an urgent care facility. The Ozempic treatment involved weekly injections, with the dosage increased after 4 weeks. Farzad found the medication uncomfortable and discontinued its use.    Since the development of the rectal issue, the patient reports normal stools without blood. He has been attempting to manage the condition by limiting time on the toilet, maintaining hydration, avoiding straining, using Preparation H, and manually reducing the protrusion, though it tends to recur.    Farzad mentions a history of esophageal dysphagia prior to surgery, which resolved his issues with food impaction. He has had 2 hernia surgeries in the past and currently has an umbilical hernia. Farzad denies pain from the rectal protrusion, hematochezia, or current issues with dysphagia.    MEDICATIONS:  Farzad is on Adderall, taking 3 tablets daily. He is also on Klonopin, taking 1.5 to 2 tablets at night for sleep.    MEDICAL HISTORY:  Farzad has a history of esophageal dysphagia, hypercholesterolemia, and umbilical hernia.    FAMILY HISTORY:  Family history is significant for father who had a myocardial infarction at age 38, has a pacemaker, and a history of alcohol use and smoking.    SURGICAL HISTORY:  Farzad underwent left rotator cuff surgery in May (year not specified). He has also had two hernia  "surgeries and an esophageal surgery for dysphagia, which resolved his dysphagia. The dates for these surgeries are not specified.    TEST RESULTS:  Farzad had labs done in 2021, which showed mild electrolyte abnormalities, normal blood counts, and normal kidney and liver function. An EKG was performed in 2017, which was normal.    SOCIAL HISTORY:  Farzad denies alcohol and tobacco use. He works as a business owner.      ROS:  ENT: -difficulty swallowing  Gastrointestinal: +diarrhea, -change in bowel habits           Review of Systems   Constitutional: Negative.  Negative for fatigue and fever.   HENT: Negative.  Negative for congestion, sinus pressure and sinus pain.    Eyes: Negative.    Respiratory: Negative.  Negative for cough, choking, shortness of breath and wheezing.    Cardiovascular: Negative.  Negative for chest pain, palpitations and leg swelling.   Gastrointestinal:  Positive for abdominal pain, blood in stool and constipation. Negative for diarrhea, nausea and vomiting.   Endocrine: Negative.    Genitourinary: Negative.  Negative for difficulty urinating and frequency.   Musculoskeletal:  Positive for arthralgias (left shoulder recovering from surgery.). Negative for myalgias.   Skin: Negative.    Allergic/Immunologic: Negative for food allergies.   Neurological:  Negative for weakness and headaches.   Psychiatric/Behavioral:  Positive for decreased concentration and sleep disturbance. The patient is not nervous/anxious.            Objective:        Vitals:    11/13/24 1432   BP: 130/82   BP Location: Left arm   Patient Position: Sitting   Pulse: 92   Resp: 19   SpO2: 99%   Weight: 111 kg (244 lb 11.4 oz)   Height: 6' 5" (1.956 m)       Body mass index is 29.02 kg/m².      Physical Exam  Constitutional:       Appearance: Normal appearance. He is not toxic-appearing.      Comments: As per BMI.   HENT:      Head: Normocephalic and atraumatic.      Right Ear: External ear normal.      Left Ear: External " "ear normal.      Nose: No congestion.      Mouth/Throat:      Mouth: Mucous membranes are moist.      Pharynx: Oropharynx is clear.   Eyes:      Extraocular Movements: Extraocular movements intact.      Conjunctiva/sclera: Conjunctivae normal.   Cardiovascular:      Rate and Rhythm: Normal rate and regular rhythm.      Heart sounds: No murmur heard.  Pulmonary:      Effort: Pulmonary effort is normal. No respiratory distress.      Breath sounds: No wheezing.   Abdominal:      General: Bowel sounds are normal.      Palpations: Abdomen is soft.   Musculoskeletal:         General: No swelling.      Cervical back: Normal range of motion.   Skin:     General: Skin is warm.      Capillary Refill: Capillary refill takes less than 2 seconds.      Coloration: Skin is not jaundiced.   Neurological:      General: No focal deficit present.      Mental Status: He is alert and oriented to person, place, and time.      Motor: No weakness.   Psychiatric:         Mood and Affect: Mood normal.         Physical Exam                    Past Medical History:   Diagnosis Date    ADHD     Right inguinal hernia 12/14/2018       Lab Results   Component Value Date     04/28/2021    K 3.8 04/28/2021     04/28/2021    CO2 22 (L) 04/28/2021    BUN 11 04/28/2021    CREATININE 1.3 04/28/2021    ANIONGAP 9 04/28/2021     Lab Results   Component Value Date    HGBA1C 4.8 01/08/2021     No results found for: "BNP", "BNPTRIAGEBLO"    Lab Results   Component Value Date    WBC 7.90 04/28/2021    HGB 16.0 04/28/2021    HCT 48.0 04/28/2021     04/28/2021    GRAN 6.2 04/28/2021    GRAN 78.7 (H) 04/28/2021     Lab Results   Component Value Date    CHOL 196 01/08/2021    HDL 37 (L) 01/08/2021    LDLCALC 107.2 01/08/2021    TRIG 259 (H) 01/08/2021        Outpatient Encounter Medications as of 11/13/2024   Medication Sig Dispense Refill    clonazePAM (KLONOPIN) 1 MG tablet Take 1.5 mg by mouth every evening.      " dextroamphetamine-amphetamine (ADDERALL XR) 30 MG 24 hr capsule Take 30 mg by mouth 3 (three) times daily.      fluticasone propionate (FLONASE) 50 mcg/actuation nasal spray 1 spray by Each Nostril route once daily.      omeprazole (PRILOSEC) 40 MG capsule Take 1 capsule (40 mg total) by mouth once daily. 30 capsule 5    celecoxib (CELEBREX) 200 MG capsule Take 1 capsule (200 mg total) by mouth 2 (two) times daily. 56 capsule 1    hydrocortisone (ANUSOL-HC) 25 mg suppository Place 1 suppository (25 mg total) rectally 2 (two) times daily. 24 suppository 0     No facility-administered encounter medications on file as of 11/13/2024.

## 2024-11-14 ENCOUNTER — PATIENT MESSAGE (OUTPATIENT)
Dept: PRIMARY CARE CLINIC | Facility: CLINIC | Age: 49
End: 2024-11-14
Payer: MEDICAID

## 2024-11-14 DIAGNOSIS — Z12.11 SCREENING FOR COLON CANCER: Primary | ICD-10-CM

## 2024-11-14 LAB
OHS QRS DURATION: 66 MS
OHS QTC CALCULATION: 370 MS

## 2024-11-14 RX ORDER — SODIUM, POTASSIUM,MAG SULFATES 17.5-3.13G
1 SOLUTION, RECONSTITUTED, ORAL ORAL DAILY
Qty: 1 KIT | Refills: 0 | Status: SHIPPED | OUTPATIENT
Start: 2024-11-14 | End: 2024-11-16

## 2024-11-15 ENCOUNTER — PATIENT MESSAGE (OUTPATIENT)
Dept: PRIMARY CARE CLINIC | Facility: CLINIC | Age: 49
End: 2024-11-15
Payer: MEDICAID

## (undated) DEVICE — BLADE SHAVER LANZA 4.2X13CM

## (undated) DEVICE — SUT MCRYL PLUS 4-0 PS2 27IN

## (undated) DEVICE — DRESSING TRANS 4X4 TEGADERM

## (undated) DEVICE — SUT SILK 2-0 SH 18IN BLACK

## (undated) DEVICE — CONNECTOR TUBING STR 5 IN 1

## (undated) DEVICE — APPLICATOR CHLORAPREP ORN 26ML

## (undated) DEVICE — SUT 0 VICRYL / CT-1

## (undated) DEVICE — DRESSING XEROFORM 1X8IN

## (undated) DEVICE — SUT ETHILON 3-0 PS2 18 BLK

## (undated) DEVICE — SOL NACL IRR 3000ML

## (undated) DEVICE — COVER CAMERA OPERATING ROOM

## (undated) DEVICE — TRAY MINOR GEN SURG

## (undated) DEVICE — NDL SCORPION HD MEGALOADER

## (undated) DEVICE — SUT ETHICON 3-0 BLK MONO PS

## (undated) DEVICE — SEE MEDLINE ITEM 157148

## (undated) DEVICE — Device

## (undated) DEVICE — SEE MEDLINE ITEM 146417

## (undated) DEVICE — BNDG COFLEX FOAM LF2 ST 6X5YD

## (undated) DEVICE — PROBE ARTHO ENERGY 90 DEG

## (undated) DEVICE — BLADE POWERASP 4.0MMX13CM

## (undated) DEVICE — SYR 30CC LUER LOCK

## (undated) DEVICE — NDL 18GA X1 1/2 REG BEVEL

## (undated) DEVICE — WRAP SHLDR HIP ACCU THRM PACK

## (undated) DEVICE — DRESSING TELFA STRL 4X3 LF

## (undated) DEVICE — SEE MEDLINE ITEM 157117

## (undated) DEVICE — SUT 2-0 VICRYL / CT-1

## (undated) DEVICE — PAD SHOULDER POLAR CARE XL

## (undated) DEVICE — DRESSING XEROFORM NONADH 1X8IN

## (undated) DEVICE — KIT SHOULDER POSITIONER SPIDER

## (undated) DEVICE — NDL HYPO REG 25G X 1 1/2

## (undated) DEVICE — DRAPE STERI-DRAPE 1000 17X11IN

## (undated) DEVICE — SUT CTD VICRYL 3-0 UND BR

## (undated) DEVICE — DRESSING TELFA N ADH 3X8

## (undated) DEVICE — CANNULA TWIST IN 7MM X 7CM

## (undated) DEVICE — DRESSING AQUACEL AG SILVER 4X4

## (undated) DEVICE — GLOVE SENSICARE PI GRN 8.5

## (undated) DEVICE — GOWN SURGICAL X-LARGE

## (undated) DEVICE — CANNULA PASSPORT 8 MM X 4CM.

## (undated) DEVICE — SUPPORT SLING SHOT II MEDIUM

## (undated) DEVICE — GLOVE SENSICARE PI SURG 8

## (undated) DEVICE — ELECTRODE REM PLYHSV RETURN 9

## (undated) DEVICE — SUT VICRYL PLUS 3-0 SH 18IN

## (undated) DEVICE — DRESSING AQUACEL AG FOAM 4X4

## (undated) DEVICE — TOWEL OR DISP STRL BLUE 4/PK

## (undated) DEVICE — ADHESIVE MASTISOL VIAL 48/BX

## (undated) DEVICE — PAD ABDOMINAL STERILE 8X10IN

## (undated) DEVICE — TUBE SET INFLOW/OUTFLOW

## (undated) DEVICE — TUBING SUC UNIV W/CONN 12FT

## (undated) DEVICE — SUT VICRYL 3-0 27 SH

## (undated) DEVICE — SUT VICRYL 3-0 27 CT-1

## (undated) DEVICE — GLOVE BIOGEL PI MICRO INDIC 7

## (undated) DEVICE — DRAIN PENROSE XRAY 12 X 1/4 ST

## (undated) DEVICE — SUT D SPECIAL VICRYL 2-0

## (undated) DEVICE — PAD ABD 8X10 STERILE

## (undated) DEVICE — SUT PROLENE 0 MO6 30IN BLUE

## (undated) DEVICE — YANKAUER OPEN TIP W/O VENT

## (undated) DEVICE — CLOSURE SKIN STERI STRIP 1/2X4

## (undated) DEVICE — DRAPE STERI U-SHAPED 47X51IN

## (undated) DEVICE — COVER LIGHT HANDLE 80/CA

## (undated) DEVICE — SEE MEDLINE ITEM 152622

## (undated) DEVICE — DRAPE STERI INSTRUMENT 1018

## (undated) DEVICE — SLING SHOT II LARGE

## (undated) DEVICE — TAPE SURG DURAPORE 2 X10YD

## (undated) DEVICE — DRESSING AQUACEL AG ADV 3.5X6